# Patient Record
Sex: MALE | Race: WHITE | URBAN - METROPOLITAN AREA
[De-identification: names, ages, dates, MRNs, and addresses within clinical notes are randomized per-mention and may not be internally consistent; named-entity substitution may affect disease eponyms.]

---

## 2017-01-01 ENCOUNTER — APPOINTMENT (OUTPATIENT)
Dept: GENERAL RADIOLOGY | Facility: CLINIC | Age: 82
DRG: 283 | End: 2017-01-01
Attending: INTERNAL MEDICINE
Payer: MEDICARE

## 2017-01-01 ENCOUNTER — APPOINTMENT (OUTPATIENT)
Dept: ULTRASOUND IMAGING | Facility: CLINIC | Age: 82
DRG: 280 | End: 2017-01-01
Attending: INTERNAL MEDICINE
Payer: MEDICARE

## 2017-01-01 ENCOUNTER — APPOINTMENT (OUTPATIENT)
Dept: SPEECH THERAPY | Facility: CLINIC | Age: 82
DRG: 280 | End: 2017-01-01
Payer: MEDICARE

## 2017-01-01 ENCOUNTER — CARE COORDINATION (OUTPATIENT)
Dept: CARDIOLOGY | Facility: CLINIC | Age: 82
End: 2017-01-01

## 2017-01-01 ENCOUNTER — APPOINTMENT (OUTPATIENT)
Dept: NUCLEAR MEDICINE | Facility: CLINIC | Age: 82
DRG: 283 | End: 2017-01-01
Attending: INTERNAL MEDICINE
Payer: MEDICARE

## 2017-01-01 ENCOUNTER — HOSPITAL ENCOUNTER (OUTPATIENT)
Age: 82
End: 2017-01-01
Payer: MEDICARE

## 2017-01-01 ENCOUNTER — APPOINTMENT (OUTPATIENT)
Dept: OCCUPATIONAL THERAPY | Facility: CLINIC | Age: 82
DRG: 280 | End: 2017-01-01
Payer: MEDICARE

## 2017-01-01 ENCOUNTER — ANESTHESIA EVENT (OUTPATIENT)
Dept: CARDIOVASCULAR ICU | Facility: CLINIC | Age: 82
DRG: 280 | End: 2017-01-01
Payer: MEDICARE

## 2017-01-01 ENCOUNTER — APPOINTMENT (OUTPATIENT)
Dept: GENERAL RADIOLOGY | Facility: CLINIC | Age: 82
DRG: 280 | End: 2017-01-01
Attending: INTERNAL MEDICINE
Payer: MEDICARE

## 2017-01-01 ENCOUNTER — ANESTHESIA (OUTPATIENT)
Dept: CARDIOVASCULAR ICU | Facility: CLINIC | Age: 82
DRG: 280 | End: 2017-01-01
Payer: MEDICARE

## 2017-01-01 ENCOUNTER — APPOINTMENT (OUTPATIENT)
Dept: OCCUPATIONAL THERAPY | Facility: CLINIC | Age: 82
DRG: 280 | End: 2017-01-01
Attending: INTERNAL MEDICINE
Payer: MEDICARE

## 2017-01-01 ENCOUNTER — HOSPITAL ENCOUNTER (INPATIENT)
Facility: CLINIC | Age: 82
LOS: 4 days | DRG: 283 | End: 2017-12-10
Attending: EMERGENCY MEDICINE | Admitting: INTERNAL MEDICINE
Payer: MEDICARE

## 2017-01-01 ENCOUNTER — APPOINTMENT (OUTPATIENT)
Dept: GENERAL RADIOLOGY | Facility: CLINIC | Age: 82
DRG: 280 | End: 2017-01-01
Attending: EMERGENCY MEDICINE
Payer: MEDICARE

## 2017-01-01 ENCOUNTER — OFFICE VISIT (OUTPATIENT)
Dept: CARDIOLOGY | Facility: CLINIC | Age: 82
End: 2017-01-01
Payer: COMMERCIAL

## 2017-01-01 ENCOUNTER — HOSPITAL ENCOUNTER (INPATIENT)
Facility: CLINIC | Age: 82
LOS: 10 days | Discharge: NURSING, REHAB OR LONG-TERM CARE | DRG: 280 | End: 2017-12-04
Attending: EMERGENCY MEDICINE | Admitting: HOSPITALIST
Payer: MEDICARE

## 2017-01-01 ENCOUNTER — APPOINTMENT (OUTPATIENT)
Dept: CARDIOLOGY | Facility: CLINIC | Age: 82
DRG: 280 | End: 2017-01-01
Attending: EMERGENCY MEDICINE
Payer: MEDICARE

## 2017-01-01 ENCOUNTER — APPOINTMENT (OUTPATIENT)
Dept: SPEECH THERAPY | Facility: CLINIC | Age: 82
DRG: 280 | End: 2017-01-01
Attending: INTERNAL MEDICINE
Payer: MEDICARE

## 2017-01-01 ENCOUNTER — APPOINTMENT (OUTPATIENT)
Dept: CARDIOLOGY | Facility: CLINIC | Age: 82
DRG: 283 | End: 2017-01-01
Attending: INTERNAL MEDICINE
Payer: MEDICARE

## 2017-01-01 VITALS
HEIGHT: 68 IN | DIASTOLIC BLOOD PRESSURE: 69 MMHG | RESPIRATION RATE: 18 BRPM | OXYGEN SATURATION: 94 % | TEMPERATURE: 97.3 F | WEIGHT: 174.2 LBS | SYSTOLIC BLOOD PRESSURE: 112 MMHG | HEART RATE: 87 BPM | BODY MASS INDEX: 26.4 KG/M2

## 2017-01-01 VITALS
SYSTOLIC BLOOD PRESSURE: 90 MMHG | DIASTOLIC BLOOD PRESSURE: 60 MMHG | HEIGHT: 69 IN | WEIGHT: 171.2 LBS | BODY MASS INDEX: 25.36 KG/M2 | OXYGEN SATURATION: 94 % | TEMPERATURE: 97.9 F | RESPIRATION RATE: 15 BRPM

## 2017-01-01 VITALS
DIASTOLIC BLOOD PRESSURE: 72 MMHG | OXYGEN SATURATION: 97 % | BODY MASS INDEX: 26.25 KG/M2 | SYSTOLIC BLOOD PRESSURE: 109 MMHG | HEIGHT: 68 IN | WEIGHT: 173.2 LBS | HEART RATE: 81 BPM

## 2017-01-01 DIAGNOSIS — N17.9 ACUTE RENAL FAILURE, UNSPECIFIED ACUTE RENAL FAILURE TYPE (H): ICD-10-CM

## 2017-01-01 DIAGNOSIS — I50.9 HEART FAILURE (H): ICD-10-CM

## 2017-01-01 DIAGNOSIS — I21.4 NSTEMI (NON-ST ELEVATED MYOCARDIAL INFARCTION) (H): ICD-10-CM

## 2017-01-01 DIAGNOSIS — I50.21 ACUTE SYSTOLIC CONGESTIVE HEART FAILURE (H): Primary | ICD-10-CM

## 2017-01-01 DIAGNOSIS — I48.20 CHRONIC ATRIAL FIBRILLATION (H): ICD-10-CM

## 2017-01-01 DIAGNOSIS — E10.42 TYPE 1 DIABETES MELLITUS WITH DIABETIC POLYNEUROPATHY (H): ICD-10-CM

## 2017-01-01 DIAGNOSIS — R57.0 CARDIOGENIC SHOCK (H): ICD-10-CM

## 2017-01-01 DIAGNOSIS — I50.21 ACUTE SYSTOLIC CONGESTIVE HEART FAILURE (H): ICD-10-CM

## 2017-01-01 DIAGNOSIS — R10.13 DYSPEPSIA: ICD-10-CM

## 2017-01-01 LAB
ALBUMIN SERPL-MCNC: 3.6 G/DL (ref 3.4–5)
ALBUMIN UR-MCNC: 10 MG/DL
ALP SERPL-CCNC: 115 U/L (ref 40–150)
ALT SERPL W P-5'-P-CCNC: 33 U/L (ref 0–70)
ANION GAP SERPL CALCULATED.3IONS-SCNC: 10 MMOL/L (ref 3–14)
ANION GAP SERPL CALCULATED.3IONS-SCNC: 11 MMOL/L (ref 3–14)
ANION GAP SERPL CALCULATED.3IONS-SCNC: 13 MMOL/L (ref 3–14)
ANION GAP SERPL CALCULATED.3IONS-SCNC: 13.4 MMOL/L (ref 6–17)
ANION GAP SERPL CALCULATED.3IONS-SCNC: 14 MMOL/L (ref 3–14)
ANION GAP SERPL CALCULATED.3IONS-SCNC: 9 MMOL/L (ref 3–14)
APPEARANCE UR: CLEAR
AST SERPL W P-5'-P-CCNC: 41 U/L (ref 0–45)
BACTERIA SPEC CULT: NO GROWTH
BASOPHILS # BLD AUTO: 0 10E9/L (ref 0–0.2)
BASOPHILS NFR BLD AUTO: 0 %
BASOPHILS NFR BLD AUTO: 0.1 %
BASOPHILS NFR BLD AUTO: 0.1 %
BILIRUB SERPL-MCNC: 0.9 MG/DL (ref 0.2–1.3)
BILIRUB UR QL STRIP: NEGATIVE
BUN SERPL-MCNC: 54 MG/DL (ref 7–30)
BUN SERPL-MCNC: 63 MG/DL (ref 7–30)
BUN SERPL-MCNC: 68 MG/DL (ref 7–30)
BUN SERPL-MCNC: 70 MG/DL (ref 7–30)
BUN SERPL-MCNC: 71 MG/DL (ref 7–30)
BUN SERPL-MCNC: 72 MG/DL (ref 7–30)
BUN SERPL-MCNC: 73 MG/DL (ref 7–30)
BUN SERPL-MCNC: 73 MG/DL (ref 7–30)
BUN SERPL-MCNC: 74 MG/DL (ref 7–30)
BUN SERPL-MCNC: 74 MG/DL (ref 7–30)
BUN SERPL-MCNC: 75 MG/DL (ref 7–30)
BUN SERPL-MCNC: 75 MG/DL (ref 7–30)
BUN SERPL-MCNC: 76 MG/DL (ref 7–30)
BUN SERPL-MCNC: 77 MG/DL (ref 7–30)
BUN SERPL-MCNC: 77 MG/DL (ref 7–30)
BUN SERPL-MCNC: 78 MG/DL (ref 7–30)
BUN SERPL-MCNC: 80 MG/DL (ref 7–30)
CALCIUM SERPL-MCNC: 8.4 MG/DL (ref 8.5–10.1)
CALCIUM SERPL-MCNC: 8.4 MG/DL (ref 8.5–10.1)
CALCIUM SERPL-MCNC: 8.5 MG/DL (ref 8.5–10.1)
CALCIUM SERPL-MCNC: 8.6 MG/DL (ref 8.5–10.1)
CALCIUM SERPL-MCNC: 8.7 MG/DL (ref 8.5–10.1)
CALCIUM SERPL-MCNC: 8.8 MG/DL (ref 8.5–10.1)
CALCIUM SERPL-MCNC: 8.9 MG/DL (ref 8.5–10.1)
CALCIUM SERPL-MCNC: 9.1 MG/DL (ref 8.5–10.1)
CALCIUM SERPL-MCNC: 9.1 MG/DL (ref 8.5–10.1)
CALCIUM SERPL-MCNC: 9.2 MG/DL (ref 8.5–10.1)
CALCIUM SERPL-MCNC: 9.6 MG/DL (ref 8.5–10.5)
CHLORIDE SERPL-SCNC: 100 MMOL/L (ref 94–109)
CHLORIDE SERPL-SCNC: 100 MMOL/L (ref 94–109)
CHLORIDE SERPL-SCNC: 101 MMOL/L (ref 94–109)
CHLORIDE SERPL-SCNC: 101 MMOL/L (ref 94–109)
CHLORIDE SERPL-SCNC: 93 MMOL/L (ref 94–109)
CHLORIDE SERPL-SCNC: 93 MMOL/L (ref 94–109)
CHLORIDE SERPL-SCNC: 93 MMOL/L (ref 98–107)
CHLORIDE SERPL-SCNC: 94 MMOL/L (ref 94–109)
CHLORIDE SERPL-SCNC: 95 MMOL/L (ref 94–109)
CHLORIDE SERPL-SCNC: 96 MMOL/L (ref 94–109)
CHLORIDE SERPL-SCNC: 96 MMOL/L (ref 94–109)
CHLORIDE SERPL-SCNC: 97 MMOL/L (ref 94–109)
CHLORIDE SERPL-SCNC: 98 MMOL/L (ref 94–109)
CHLORIDE SERPL-SCNC: 98 MMOL/L (ref 94–109)
CHLORIDE SERPL-SCNC: 99 MMOL/L (ref 94–109)
CO2 SERPL-SCNC: 21 MMOL/L (ref 20–32)
CO2 SERPL-SCNC: 21 MMOL/L (ref 20–32)
CO2 SERPL-SCNC: 22 MMOL/L (ref 20–32)
CO2 SERPL-SCNC: 23 MMOL/L (ref 20–32)
CO2 SERPL-SCNC: 23 MMOL/L (ref 20–32)
CO2 SERPL-SCNC: 24 MMOL/L (ref 20–32)
CO2 SERPL-SCNC: 24 MMOL/L (ref 23–29)
CO2 SERPL-SCNC: 25 MMOL/L (ref 20–32)
CO2 SERPL-SCNC: 25 MMOL/L (ref 20–32)
CO2 SERPL-SCNC: 26 MMOL/L (ref 20–32)
COLOR UR AUTO: YELLOW
CREAT SERPL-MCNC: 2.09 MG/DL (ref 0.66–1.25)
CREAT SERPL-MCNC: 2.11 MG/DL (ref 0.66–1.25)
CREAT SERPL-MCNC: 2.18 MG/DL (ref 0.66–1.25)
CREAT SERPL-MCNC: 2.19 MG/DL (ref 0.66–1.25)
CREAT SERPL-MCNC: 2.2 MG/DL (ref 0.66–1.25)
CREAT SERPL-MCNC: 2.2 MG/DL (ref 0.66–1.25)
CREAT SERPL-MCNC: 2.22 MG/DL (ref 0.66–1.25)
CREAT SERPL-MCNC: 2.24 MG/DL (ref 0.66–1.25)
CREAT SERPL-MCNC: 2.27 MG/DL (ref 0.66–1.25)
CREAT SERPL-MCNC: 2.28 MG/DL (ref 0.66–1.25)
CREAT SERPL-MCNC: 2.3 MG/DL (ref 0.66–1.25)
CREAT SERPL-MCNC: 2.3 MG/DL (ref 0.66–1.25)
CREAT SERPL-MCNC: 2.33 MG/DL (ref 0.66–1.25)
CREAT SERPL-MCNC: 2.36 MG/DL (ref 0.66–1.25)
CREAT SERPL-MCNC: 2.44 MG/DL (ref 0.66–1.25)
CREAT SERPL-MCNC: 2.5 MG/DL (ref 0.7–1.3)
CREAT SERPL-MCNC: 2.55 MG/DL (ref 0.66–1.25)
CREAT UR-MCNC: 289 MG/DL
DEPRECATED CALCIDIOL+CALCIFEROL SERPL-MC: 36 UG/L (ref 20–75)
DIFFERENTIAL METHOD BLD: ABNORMAL
EOSINOPHIL # BLD AUTO: 0 10E9/L (ref 0–0.7)
EOSINOPHIL # BLD AUTO: 0.1 10E9/L (ref 0–0.7)
EOSINOPHIL # BLD AUTO: 0.1 10E9/L (ref 0–0.7)
EOSINOPHIL NFR BLD AUTO: 0.1 %
EOSINOPHIL NFR BLD AUTO: 0.6 %
EOSINOPHIL NFR BLD AUTO: 0.6 %
ERYTHROCYTE [DISTWIDTH] IN BLOOD BY AUTOMATED COUNT: 14.6 % (ref 10–15)
ERYTHROCYTE [DISTWIDTH] IN BLOOD BY AUTOMATED COUNT: 14.8 % (ref 10–15)
ERYTHROCYTE [DISTWIDTH] IN BLOOD BY AUTOMATED COUNT: 14.8 % (ref 10–15)
ERYTHROCYTE [DISTWIDTH] IN BLOOD BY AUTOMATED COUNT: 15.1 % (ref 10–15)
ERYTHROCYTE [DISTWIDTH] IN BLOOD BY AUTOMATED COUNT: 15.2 % (ref 10–15)
FLUAV+FLUBV AG SPEC QL: NEGATIVE
FLUAV+FLUBV AG SPEC QL: NEGATIVE
GFR SERPL CREATININE-BSD FRML MDRD: 24 ML/MIN/1.7M2
GFR SERPL CREATININE-BSD FRML MDRD: 24 ML/MIN/1.7M2
GFR SERPL CREATININE-BSD FRML MDRD: 25 ML/MIN/1.7M2
GFR SERPL CREATININE-BSD FRML MDRD: 26 ML/MIN/1.7M2
GFR SERPL CREATININE-BSD FRML MDRD: 26 ML/MIN/1.7M2
GFR SERPL CREATININE-BSD FRML MDRD: 27 ML/MIN/1.7M2
GFR SERPL CREATININE-BSD FRML MDRD: 28 ML/MIN/1.7M2
GFR SERPL CREATININE-BSD FRML MDRD: 29 ML/MIN/1.7M2
GFR SERPL CREATININE-BSD FRML MDRD: 30 ML/MIN/1.7M2
GFR SERPL CREATININE-BSD FRML MDRD: 30 ML/MIN/1.7M2
GLUCOSE BLDC GLUCOMTR-MCNC: 101 MG/DL (ref 70–99)
GLUCOSE BLDC GLUCOMTR-MCNC: 102 MG/DL (ref 70–99)
GLUCOSE BLDC GLUCOMTR-MCNC: 103 MG/DL (ref 70–99)
GLUCOSE BLDC GLUCOMTR-MCNC: 103 MG/DL (ref 70–99)
GLUCOSE BLDC GLUCOMTR-MCNC: 104 MG/DL (ref 70–99)
GLUCOSE BLDC GLUCOMTR-MCNC: 107 MG/DL (ref 70–99)
GLUCOSE BLDC GLUCOMTR-MCNC: 107 MG/DL (ref 70–99)
GLUCOSE BLDC GLUCOMTR-MCNC: 110 MG/DL (ref 70–99)
GLUCOSE BLDC GLUCOMTR-MCNC: 110 MG/DL (ref 70–99)
GLUCOSE BLDC GLUCOMTR-MCNC: 111 MG/DL (ref 70–99)
GLUCOSE BLDC GLUCOMTR-MCNC: 121 MG/DL (ref 70–99)
GLUCOSE BLDC GLUCOMTR-MCNC: 122 MG/DL (ref 70–99)
GLUCOSE BLDC GLUCOMTR-MCNC: 125 MG/DL (ref 70–99)
GLUCOSE BLDC GLUCOMTR-MCNC: 126 MG/DL (ref 70–99)
GLUCOSE BLDC GLUCOMTR-MCNC: 130 MG/DL (ref 70–99)
GLUCOSE BLDC GLUCOMTR-MCNC: 131 MG/DL (ref 70–99)
GLUCOSE BLDC GLUCOMTR-MCNC: 131 MG/DL (ref 70–99)
GLUCOSE BLDC GLUCOMTR-MCNC: 132 MG/DL (ref 70–99)
GLUCOSE BLDC GLUCOMTR-MCNC: 134 MG/DL (ref 70–99)
GLUCOSE BLDC GLUCOMTR-MCNC: 135 MG/DL (ref 70–99)
GLUCOSE BLDC GLUCOMTR-MCNC: 136 MG/DL (ref 70–99)
GLUCOSE BLDC GLUCOMTR-MCNC: 137 MG/DL (ref 70–99)
GLUCOSE BLDC GLUCOMTR-MCNC: 138 MG/DL (ref 70–99)
GLUCOSE BLDC GLUCOMTR-MCNC: 139 MG/DL (ref 70–99)
GLUCOSE BLDC GLUCOMTR-MCNC: 143 MG/DL (ref 70–99)
GLUCOSE BLDC GLUCOMTR-MCNC: 147 MG/DL (ref 70–99)
GLUCOSE BLDC GLUCOMTR-MCNC: 149 MG/DL (ref 70–99)
GLUCOSE BLDC GLUCOMTR-MCNC: 150 MG/DL (ref 70–99)
GLUCOSE BLDC GLUCOMTR-MCNC: 155 MG/DL (ref 70–99)
GLUCOSE BLDC GLUCOMTR-MCNC: 161 MG/DL (ref 70–99)
GLUCOSE BLDC GLUCOMTR-MCNC: 162 MG/DL (ref 70–99)
GLUCOSE BLDC GLUCOMTR-MCNC: 162 MG/DL (ref 70–99)
GLUCOSE BLDC GLUCOMTR-MCNC: 164 MG/DL (ref 70–99)
GLUCOSE BLDC GLUCOMTR-MCNC: 165 MG/DL (ref 70–99)
GLUCOSE BLDC GLUCOMTR-MCNC: 167 MG/DL (ref 70–99)
GLUCOSE BLDC GLUCOMTR-MCNC: 168 MG/DL (ref 70–99)
GLUCOSE BLDC GLUCOMTR-MCNC: 169 MG/DL (ref 70–99)
GLUCOSE BLDC GLUCOMTR-MCNC: 169 MG/DL (ref 70–99)
GLUCOSE BLDC GLUCOMTR-MCNC: 171 MG/DL (ref 70–99)
GLUCOSE BLDC GLUCOMTR-MCNC: 174 MG/DL (ref 70–99)
GLUCOSE BLDC GLUCOMTR-MCNC: 177 MG/DL (ref 70–99)
GLUCOSE BLDC GLUCOMTR-MCNC: 179 MG/DL (ref 70–99)
GLUCOSE BLDC GLUCOMTR-MCNC: 183 MG/DL (ref 70–99)
GLUCOSE BLDC GLUCOMTR-MCNC: 184 MG/DL (ref 70–99)
GLUCOSE BLDC GLUCOMTR-MCNC: 188 MG/DL (ref 70–99)
GLUCOSE BLDC GLUCOMTR-MCNC: 192 MG/DL (ref 70–99)
GLUCOSE BLDC GLUCOMTR-MCNC: 192 MG/DL (ref 70–99)
GLUCOSE BLDC GLUCOMTR-MCNC: 195 MG/DL (ref 70–99)
GLUCOSE BLDC GLUCOMTR-MCNC: 203 MG/DL (ref 70–99)
GLUCOSE BLDC GLUCOMTR-MCNC: 206 MG/DL (ref 70–99)
GLUCOSE BLDC GLUCOMTR-MCNC: 207 MG/DL (ref 70–99)
GLUCOSE BLDC GLUCOMTR-MCNC: 215 MG/DL (ref 70–99)
GLUCOSE BLDC GLUCOMTR-MCNC: 224 MG/DL (ref 70–99)
GLUCOSE BLDC GLUCOMTR-MCNC: 240 MG/DL (ref 70–99)
GLUCOSE BLDC GLUCOMTR-MCNC: 244 MG/DL (ref 70–99)
GLUCOSE BLDC GLUCOMTR-MCNC: 247 MG/DL (ref 70–99)
GLUCOSE BLDC GLUCOMTR-MCNC: 252 MG/DL (ref 70–99)
GLUCOSE BLDC GLUCOMTR-MCNC: 268 MG/DL (ref 70–99)
GLUCOSE BLDC GLUCOMTR-MCNC: 65 MG/DL (ref 70–99)
GLUCOSE BLDC GLUCOMTR-MCNC: 71 MG/DL (ref 70–99)
GLUCOSE BLDC GLUCOMTR-MCNC: 74 MG/DL (ref 70–99)
GLUCOSE BLDC GLUCOMTR-MCNC: 75 MG/DL (ref 70–99)
GLUCOSE BLDC GLUCOMTR-MCNC: 75 MG/DL (ref 70–99)
GLUCOSE BLDC GLUCOMTR-MCNC: 79 MG/DL (ref 70–99)
GLUCOSE BLDC GLUCOMTR-MCNC: 83 MG/DL (ref 70–99)
GLUCOSE BLDC GLUCOMTR-MCNC: 83 MG/DL (ref 70–99)
GLUCOSE BLDC GLUCOMTR-MCNC: 85 MG/DL (ref 70–99)
GLUCOSE BLDC GLUCOMTR-MCNC: 88 MG/DL (ref 70–99)
GLUCOSE BLDC GLUCOMTR-MCNC: 92 MG/DL (ref 70–99)
GLUCOSE BLDC GLUCOMTR-MCNC: 94 MG/DL (ref 70–99)
GLUCOSE SERPL-MCNC: 101 MG/DL (ref 70–99)
GLUCOSE SERPL-MCNC: 102 MG/DL (ref 70–99)
GLUCOSE SERPL-MCNC: 108 MG/DL (ref 70–99)
GLUCOSE SERPL-MCNC: 130 MG/DL (ref 70–99)
GLUCOSE SERPL-MCNC: 143 MG/DL (ref 70–99)
GLUCOSE SERPL-MCNC: 176 MG/DL (ref 70–99)
GLUCOSE SERPL-MCNC: 190 MG/DL (ref 70–105)
GLUCOSE SERPL-MCNC: 236 MG/DL (ref 70–99)
GLUCOSE SERPL-MCNC: 272 MG/DL (ref 70–99)
GLUCOSE SERPL-MCNC: 61 MG/DL (ref 70–99)
GLUCOSE SERPL-MCNC: 63 MG/DL (ref 70–99)
GLUCOSE SERPL-MCNC: 70 MG/DL (ref 70–99)
GLUCOSE SERPL-MCNC: 75 MG/DL (ref 70–99)
GLUCOSE SERPL-MCNC: 79 MG/DL (ref 70–99)
GLUCOSE SERPL-MCNC: 85 MG/DL (ref 70–99)
GLUCOSE SERPL-MCNC: 97 MG/DL (ref 70–99)
GLUCOSE SERPL-MCNC: 98 MG/DL (ref 70–99)
GLUCOSE UR STRIP-MCNC: NEGATIVE MG/DL
HBA1C MFR BLD: 8.4 % (ref 4.3–6)
HCT VFR BLD AUTO: 31.8 % (ref 40–53)
HCT VFR BLD AUTO: 32.1 % (ref 40–53)
HCT VFR BLD AUTO: 34.7 % (ref 40–53)
HCT VFR BLD AUTO: 35.6 % (ref 40–53)
HCT VFR BLD AUTO: 35.6 % (ref 40–53)
HGB BLD-MCNC: 10.5 G/DL (ref 13.3–17.7)
HGB BLD-MCNC: 10.6 G/DL (ref 13.3–17.7)
HGB BLD-MCNC: 11.5 G/DL (ref 13.3–17.7)
HGB BLD-MCNC: 11.6 G/DL (ref 13.3–17.7)
HGB BLD-MCNC: 11.7 G/DL (ref 13.3–17.7)
HGB UR QL STRIP: NEGATIVE
IMM GRANULOCYTES # BLD: 0 10E9/L (ref 0–0.4)
IMM GRANULOCYTES NFR BLD: 0.2 %
IMM GRANULOCYTES NFR BLD: 0.3 %
IMM GRANULOCYTES NFR BLD: 0.3 %
INR PPP: 1.83 (ref 0.86–1.14)
INR PPP: 1.86 (ref 0.86–1.14)
INR PPP: 1.98 (ref 0.86–1.14)
INR PPP: 2.02 (ref 0.86–1.14)
INR PPP: 2.16 (ref 0.86–1.14)
INR PPP: 2.22 (ref 0.86–1.14)
INR PPP: 2.49 (ref 0.86–1.14)
INR PPP: 2.69 (ref 0.86–1.14)
INR PPP: 2.96 (ref 0.86–1.14)
INR PPP: 3.1 (ref 0.86–1.14)
INR PPP: 3.23 (ref 0.86–1.14)
INR PPP: 3.31 (ref 0.86–1.14)
INR PPP: 3.69 (ref 0.86–1.14)
INR PPP: 3.71 (ref 0.86–1.14)
INR PPP: 6.51 (ref 0.86–1.14)
INR PPP: 6.67 (ref 0.86–1.14)
INTERPRETATION ECG - MUSE: NORMAL
KETONES UR STRIP-MCNC: 5 MG/DL
LACTATE BLD-SCNC: 1.1 MMOL/L (ref 0.7–2)
LACTATE BLD-SCNC: 3.1 MMOL/L (ref 0.7–2)
LACTATE BLD-SCNC: 4.9 MMOL/L (ref 0.7–2)
LACTATE BLD-SCNC: 5.4 MMOL/L (ref 0.7–2)
LEUKOCYTE ESTERASE UR QL STRIP: ABNORMAL
LMWH PPP CHRO-ACNC: 0.22 IU/ML
LMWH PPP CHRO-ACNC: 0.23 IU/ML
LMWH PPP CHRO-ACNC: 0.67 IU/ML
LYMPHOCYTES # BLD AUTO: 1.3 10E9/L (ref 0.8–5.3)
LYMPHOCYTES # BLD AUTO: 1.4 10E9/L (ref 0.8–5.3)
LYMPHOCYTES # BLD AUTO: 2.8 10E9/L (ref 0.8–5.3)
LYMPHOCYTES NFR BLD AUTO: 13.5 %
LYMPHOCYTES NFR BLD AUTO: 17 %
LYMPHOCYTES NFR BLD AUTO: 31 %
Lab: NORMAL
MAGNESIUM SERPL-MCNC: 2.7 MG/DL (ref 1.6–2.3)
MCH RBC QN AUTO: 29.3 PG (ref 26.5–33)
MCH RBC QN AUTO: 29.4 PG (ref 26.5–33)
MCH RBC QN AUTO: 29.6 PG (ref 26.5–33)
MCH RBC QN AUTO: 29.7 PG (ref 26.5–33)
MCH RBC QN AUTO: 30.2 PG (ref 26.5–33)
MCHC RBC AUTO-ENTMCNC: 32.6 G/DL (ref 31.5–36.5)
MCHC RBC AUTO-ENTMCNC: 32.7 G/DL (ref 31.5–36.5)
MCHC RBC AUTO-ENTMCNC: 32.9 G/DL (ref 31.5–36.5)
MCHC RBC AUTO-ENTMCNC: 33.1 G/DL (ref 31.5–36.5)
MCHC RBC AUTO-ENTMCNC: 33.3 G/DL (ref 31.5–36.5)
MCV RBC AUTO: 88 FL (ref 78–100)
MCV RBC AUTO: 89 FL (ref 78–100)
MCV RBC AUTO: 91 FL (ref 78–100)
MCV RBC AUTO: 91 FL (ref 78–100)
MCV RBC AUTO: 92 FL (ref 78–100)
MICROALBUMIN UR-MCNC: 54 MG/L
MICROALBUMIN/CREAT UR: 18.79 MG/G CR (ref 0–17)
MONOCYTES # BLD AUTO: 0.4 10E9/L (ref 0–1.3)
MONOCYTES # BLD AUTO: 0.5 10E9/L (ref 0–1.3)
MONOCYTES # BLD AUTO: 0.6 10E9/L (ref 0–1.3)
MONOCYTES NFR BLD AUTO: 4.5 %
MONOCYTES NFR BLD AUTO: 5.6 %
MONOCYTES NFR BLD AUTO: 7.2 %
NEUTROPHILS # BLD AUTO: 5.6 10E9/L (ref 1.6–8.3)
NEUTROPHILS # BLD AUTO: 6.1 10E9/L (ref 1.6–8.3)
NEUTROPHILS # BLD AUTO: 7.6 10E9/L (ref 1.6–8.3)
NEUTROPHILS NFR BLD AUTO: 62.4 %
NEUTROPHILS NFR BLD AUTO: 75 %
NEUTROPHILS NFR BLD AUTO: 81.5 %
NITRATE UR QL: NEGATIVE
NRBC # BLD AUTO: 0 10*3/UL
NRBC # BLD AUTO: 0 10*3/UL
NRBC # BLD AUTO: 0.1 10*3/UL
NRBC BLD AUTO-RTO: 0 /100
NRBC BLD AUTO-RTO: 0 /100
NRBC BLD AUTO-RTO: 1 /100
NT-PROBNP SERPL-MCNC: ABNORMAL PG/ML (ref 0–1800)
PH UR STRIP: 5 PH (ref 5–7)
PHOSPHATE SERPL-MCNC: 4.6 MG/DL (ref 2.5–4.5)
PLATELET # BLD AUTO: 229 10E9/L (ref 150–450)
PLATELET # BLD AUTO: 239 10E9/L (ref 150–450)
PLATELET # BLD AUTO: 245 10E9/L (ref 150–450)
PLATELET # BLD AUTO: 248 10E9/L (ref 150–450)
PLATELET # BLD AUTO: 278 10E9/L (ref 150–450)
POTASSIUM SERPL-SCNC: 4.1 MMOL/L (ref 3.4–5.3)
POTASSIUM SERPL-SCNC: 4.2 MMOL/L (ref 3.4–5.3)
POTASSIUM SERPL-SCNC: 4.3 MMOL/L (ref 3.4–5.3)
POTASSIUM SERPL-SCNC: 4.5 MMOL/L (ref 3.4–5.3)
POTASSIUM SERPL-SCNC: 4.5 MMOL/L (ref 3.4–5.3)
POTASSIUM SERPL-SCNC: 4.6 MMOL/L (ref 3.4–5.3)
POTASSIUM SERPL-SCNC: 4.6 MMOL/L (ref 3.4–5.3)
POTASSIUM SERPL-SCNC: 4.7 MMOL/L (ref 3.4–5.3)
POTASSIUM SERPL-SCNC: 4.8 MMOL/L (ref 3.4–5.3)
POTASSIUM SERPL-SCNC: 4.8 MMOL/L (ref 3.4–5.3)
POTASSIUM SERPL-SCNC: 4.9 MMOL/L (ref 3.4–5.3)
POTASSIUM SERPL-SCNC: 5 MMOL/L (ref 3.4–5.3)
POTASSIUM SERPL-SCNC: 5 MMOL/L (ref 3.4–5.3)
POTASSIUM SERPL-SCNC: 5.1 MMOL/L (ref 3.4–5.3)
POTASSIUM SERPL-SCNC: 5.2 MMOL/L (ref 3.4–5.3)
POTASSIUM SERPL-SCNC: 5.4 MMOL/L (ref 3.5–5.1)
POTASSIUM SERPL-SCNC: 5.5 MMOL/L (ref 3.4–5.3)
PROCALCITONIN SERPL-MCNC: 0.11 NG/ML
PROT SERPL-MCNC: 7.8 G/DL (ref 6.8–8.8)
PTH-INTACT SERPL-MCNC: 153 PG/ML (ref 12–72)
RBC # BLD AUTO: 3.54 10E12/L (ref 4.4–5.9)
RBC # BLD AUTO: 3.6 10E12/L (ref 4.4–5.9)
RBC # BLD AUTO: 3.87 10E12/L (ref 4.4–5.9)
RBC # BLD AUTO: 3.92 10E12/L (ref 4.4–5.9)
RBC # BLD AUTO: 3.92 10E12/L (ref 4.4–5.9)
RBC #/AREA URNS AUTO: 3 /HPF (ref 0–2)
SODIUM SERPL-SCNC: 125 MMOL/L (ref 136–145)
SODIUM SERPL-SCNC: 127 MMOL/L (ref 133–144)
SODIUM SERPL-SCNC: 128 MMOL/L (ref 133–144)
SODIUM SERPL-SCNC: 129 MMOL/L (ref 133–144)
SODIUM SERPL-SCNC: 130 MMOL/L (ref 133–144)
SODIUM SERPL-SCNC: 132 MMOL/L (ref 133–144)
SODIUM SERPL-SCNC: 133 MMOL/L (ref 133–144)
SODIUM SERPL-SCNC: 133 MMOL/L (ref 133–144)
SODIUM SERPL-SCNC: 134 MMOL/L (ref 133–144)
SODIUM SERPL-SCNC: 135 MMOL/L (ref 133–144)
SODIUM SERPL-SCNC: 136 MMOL/L (ref 133–144)
SODIUM SERPL-SCNC: 136 MMOL/L (ref 133–144)
SODIUM UR-SCNC: 9 MMOL/L
SOURCE: ABNORMAL
SP GR UR STRIP: 1.02 (ref 1–1.03)
SPECIMEN SOURCE: NORMAL
SPERM #/AREA URNS HPF: PRESENT /HPF
TROPONIN I SERPL-MCNC: 0.84 UG/L (ref 0–0.04)
TROPONIN I SERPL-MCNC: 3.59 UG/L (ref 0–0.04)
TROPONIN I SERPL-MCNC: 3.6 UG/L (ref 0–0.04)
TROPONIN I SERPL-MCNC: 4.13 UG/L (ref 0–0.04)
TROPONIN I SERPL-MCNC: 4.34 UG/L (ref 0–0.04)
TROPONIN I SERPL-MCNC: 4.42 UG/L (ref 0–0.04)
TROPONIN I SERPL-MCNC: 4.67 UG/L (ref 0–0.04)
TROPONIN I SERPL-MCNC: 5.19 UG/L (ref 0–0.04)
TROPONIN I SERPL-MCNC: 5.38 UG/L (ref 0–0.04)
TROPONIN I SERPL-MCNC: 7.23 UG/L (ref 0–0.04)
UROBILINOGEN UR STRIP-MCNC: NORMAL MG/DL (ref 0–2)
WBC # BLD AUTO: 10.5 10E9/L (ref 4–11)
WBC # BLD AUTO: 8.1 10E9/L (ref 4–11)
WBC # BLD AUTO: 9 10E9/L (ref 4–11)
WBC # BLD AUTO: 9.3 10E9/L (ref 4–11)
WBC # BLD AUTO: 9.6 10E9/L (ref 4–11)
WBC #/AREA URNS AUTO: 12 /HPF (ref 0–2)

## 2017-01-01 PROCEDURE — A9270 NON-COVERED ITEM OR SERVICE: HCPCS | Mod: GY | Performed by: HOSPITALIST

## 2017-01-01 PROCEDURE — 74230 X-RAY XM SWLNG FUNCJ C+: CPT

## 2017-01-01 PROCEDURE — 99285 EMERGENCY DEPT VISIT HI MDM: CPT | Mod: 25

## 2017-01-01 PROCEDURE — 00000146 ZZHCL STATISTIC GLUCOSE BY METER IP

## 2017-01-01 PROCEDURE — 99233 SBSQ HOSP IP/OBS HIGH 50: CPT | Performed by: INTERNAL MEDICINE

## 2017-01-01 PROCEDURE — 25000128 H RX IP 250 OP 636: Performed by: EMERGENCY MEDICINE

## 2017-01-01 PROCEDURE — 25000132 ZZH RX MED GY IP 250 OP 250 PS 637: Mod: GY | Performed by: HOSPITALIST

## 2017-01-01 PROCEDURE — 85520 HEPARIN ASSAY: CPT | Performed by: HOSPITALIST

## 2017-01-01 PROCEDURE — 99232 SBSQ HOSP IP/OBS MODERATE 35: CPT | Performed by: INTERNAL MEDICINE

## 2017-01-01 PROCEDURE — A9270 NON-COVERED ITEM OR SERVICE: HCPCS | Mod: GY | Performed by: INTERNAL MEDICINE

## 2017-01-01 PROCEDURE — 85610 PROTHROMBIN TIME: CPT | Performed by: INTERNAL MEDICINE

## 2017-01-01 PROCEDURE — 25000132 ZZH RX MED GY IP 250 OP 250 PS 637: Mod: GY | Performed by: INTERNAL MEDICINE

## 2017-01-01 PROCEDURE — 85027 COMPLETE CBC AUTOMATED: CPT | Performed by: INTERNAL MEDICINE

## 2017-01-01 PROCEDURE — 25000131 ZZH RX MED GY IP 250 OP 636 PS 637: Mod: GY | Performed by: INTERNAL MEDICINE

## 2017-01-01 PROCEDURE — 21000001 ZZH R&B HEART CARE

## 2017-01-01 PROCEDURE — 80048 BASIC METABOLIC PNL TOTAL CA: CPT | Performed by: INTERNAL MEDICINE

## 2017-01-01 PROCEDURE — 25000128 H RX IP 250 OP 636

## 2017-01-01 PROCEDURE — 84484 ASSAY OF TROPONIN QUANT: CPT | Performed by: INTERNAL MEDICINE

## 2017-01-01 PROCEDURE — 84100 ASSAY OF PHOSPHORUS: CPT | Performed by: INTERNAL MEDICINE

## 2017-01-01 PROCEDURE — 40000141 ZZH STATISTIC PERIPHERAL IV START W/O US GUIDANCE

## 2017-01-01 PROCEDURE — 99207 ZZC DOWN CODE DUE TO INITIAL EXAM: CPT | Performed by: INTERNAL MEDICINE

## 2017-01-01 PROCEDURE — 99221 1ST HOSP IP/OBS SF/LOW 40: CPT | Mod: AI | Performed by: INTERNAL MEDICINE

## 2017-01-01 PROCEDURE — A9502 TC99M TETROFOSMIN: HCPCS | Performed by: INTERNAL MEDICINE

## 2017-01-01 PROCEDURE — 21400002 ZZH R&B CCU CICU CRITICAL

## 2017-01-01 PROCEDURE — 80048 BASIC METABOLIC PNL TOTAL CA: CPT | Performed by: NURSE PRACTITIONER

## 2017-01-01 PROCEDURE — 76770 US EXAM ABDO BACK WALL COMP: CPT

## 2017-01-01 PROCEDURE — 80048 BASIC METABOLIC PNL TOTAL CA: CPT | Performed by: HOSPITALIST

## 2017-01-01 PROCEDURE — 40000225 ZZH STATISTIC SLP WARD VISIT: Performed by: SPEECH-LANGUAGE PATHOLOGIST

## 2017-01-01 PROCEDURE — 85610 PROTHROMBIN TIME: CPT | Performed by: HOSPITALIST

## 2017-01-01 PROCEDURE — 93005 ELECTROCARDIOGRAM TRACING: CPT

## 2017-01-01 PROCEDURE — 78452 HT MUSCLE IMAGE SPECT MULT: CPT

## 2017-01-01 PROCEDURE — 81001 URINALYSIS AUTO W/SCOPE: CPT | Performed by: HOSPITALIST

## 2017-01-01 PROCEDURE — 40000885 ZZH STATISTIC STEP DOWN HRS EVENING

## 2017-01-01 PROCEDURE — 25000128 H RX IP 250 OP 636: Performed by: INTERNAL MEDICINE

## 2017-01-01 PROCEDURE — 84484 ASSAY OF TROPONIN QUANT: CPT | Performed by: HOSPITALIST

## 2017-01-01 PROCEDURE — A9270 NON-COVERED ITEM OR SERVICE: HCPCS | Mod: GY | Performed by: NURSE PRACTITIONER

## 2017-01-01 PROCEDURE — 25000131 ZZH RX MED GY IP 250 OP 636 PS 637: Mod: GY | Performed by: HOSPITALIST

## 2017-01-01 PROCEDURE — 36415 COLL VENOUS BLD VENIPUNCTURE: CPT | Performed by: INTERNAL MEDICINE

## 2017-01-01 PROCEDURE — 40000893 ZZH STATISTIC PT IP EVAL DEFER: Performed by: PHYSICAL THERAPIST

## 2017-01-01 PROCEDURE — 36569 INSJ PICC 5 YR+ W/O IMAGING: CPT

## 2017-01-01 PROCEDURE — 36415 COLL VENOUS BLD VENIPUNCTURE: CPT

## 2017-01-01 PROCEDURE — 21000000 ZZH R&B IMCU HEART CARE

## 2017-01-01 PROCEDURE — 36415 COLL VENOUS BLD VENIPUNCTURE: CPT | Performed by: NURSE PRACTITIONER

## 2017-01-01 PROCEDURE — 25000128 H RX IP 250 OP 636: Performed by: HOSPITALIST

## 2017-01-01 PROCEDURE — 99207 ZZC CDG-MDM COMPONENT: MEETS MODERATE - UP CODED: CPT | Performed by: INTERNAL MEDICINE

## 2017-01-01 PROCEDURE — 40000239 ZZH STATISTIC VAT ROUNDS

## 2017-01-01 PROCEDURE — 40000225 ZZH STATISTIC SLP WARD VISIT

## 2017-01-01 PROCEDURE — 96365 THER/PROPH/DIAG IV INF INIT: CPT

## 2017-01-01 PROCEDURE — 83605 ASSAY OF LACTIC ACID: CPT | Performed by: EMERGENCY MEDICINE

## 2017-01-01 PROCEDURE — 87086 URINE CULTURE/COLONY COUNT: CPT | Performed by: HOSPITALIST

## 2017-01-01 PROCEDURE — 25000132 ZZH RX MED GY IP 250 OP 250 PS 637: Mod: GY | Performed by: NURSE PRACTITIONER

## 2017-01-01 PROCEDURE — 83605 ASSAY OF LACTIC ACID: CPT | Performed by: HOSPITALIST

## 2017-01-01 PROCEDURE — 92526 ORAL FUNCTION THERAPY: CPT | Mod: GN | Performed by: SPEECH-LANGUAGE PATHOLOGIST

## 2017-01-01 PROCEDURE — 80048 BASIC METABOLIC PNL TOTAL CA: CPT | Performed by: EMERGENCY MEDICINE

## 2017-01-01 PROCEDURE — 84484 ASSAY OF TROPONIN QUANT: CPT | Performed by: EMERGENCY MEDICINE

## 2017-01-01 PROCEDURE — 82306 VITAMIN D 25 HYDROXY: CPT | Performed by: INTERNAL MEDICINE

## 2017-01-01 PROCEDURE — 99223 1ST HOSP IP/OBS HIGH 75: CPT | Mod: 25 | Performed by: INTERNAL MEDICINE

## 2017-01-01 PROCEDURE — 83605 ASSAY OF LACTIC ACID: CPT | Performed by: INTERNAL MEDICINE

## 2017-01-01 PROCEDURE — 99223 1ST HOSP IP/OBS HIGH 75: CPT | Mod: AI | Performed by: HOSPITALIST

## 2017-01-01 PROCEDURE — 80053 COMPREHEN METABOLIC PANEL: CPT | Performed by: EMERGENCY MEDICINE

## 2017-01-01 PROCEDURE — 99238 HOSP IP/OBS DSCHRG MGMT 30/<: CPT | Performed by: NURSE PRACTITIONER

## 2017-01-01 PROCEDURE — 85027 COMPLETE CBC AUTOMATED: CPT | Performed by: HOSPITALIST

## 2017-01-01 PROCEDURE — 82043 UR ALBUMIN QUANTITATIVE: CPT | Performed by: INTERNAL MEDICINE

## 2017-01-01 PROCEDURE — A9270 NON-COVERED ITEM OR SERVICE: HCPCS | Mod: GY

## 2017-01-01 PROCEDURE — 83970 ASSAY OF PARATHORMONE: CPT | Performed by: INTERNAL MEDICINE

## 2017-01-01 PROCEDURE — 93306 TTE W/DOPPLER COMPLETE: CPT | Mod: 26 | Performed by: INTERNAL MEDICINE

## 2017-01-01 PROCEDURE — 92526 ORAL FUNCTION THERAPY: CPT | Mod: GN

## 2017-01-01 PROCEDURE — 40000855 ZZH STATISTIC ECHO STRESS OR NM NPI

## 2017-01-01 PROCEDURE — 99207 ZZC NO DOCUMENTATION ON VISIT: CPT | Performed by: INTERNAL MEDICINE

## 2017-01-01 PROCEDURE — 40000133 ZZH STATISTIC OT WARD VISIT: Performed by: OCCUPATIONAL THERAPIST

## 2017-01-01 PROCEDURE — 93017 CV STRESS TEST TRACING ONLY: CPT

## 2017-01-01 PROCEDURE — 85610 PROTHROMBIN TIME: CPT | Performed by: EMERGENCY MEDICINE

## 2017-01-01 PROCEDURE — 87804 INFLUENZA ASSAY W/OPTIC: CPT | Performed by: EMERGENCY MEDICINE

## 2017-01-01 PROCEDURE — 83735 ASSAY OF MAGNESIUM: CPT | Performed by: INTERNAL MEDICINE

## 2017-01-01 PROCEDURE — 36415 COLL VENOUS BLD VENIPUNCTURE: CPT | Performed by: HOSPITALIST

## 2017-01-01 PROCEDURE — 40000986 XR CHEST PORT 1 VW

## 2017-01-01 PROCEDURE — 85025 COMPLETE CBC W/AUTO DIFF WBC: CPT | Performed by: HOSPITALIST

## 2017-01-01 PROCEDURE — 85520 HEPARIN ASSAY: CPT | Performed by: INTERNAL MEDICINE

## 2017-01-01 PROCEDURE — 27210220 ZZH KIT SHRLOCK 5FR DUAL LUM PICC

## 2017-01-01 PROCEDURE — 87040 BLOOD CULTURE FOR BACTERIA: CPT | Performed by: EMERGENCY MEDICINE

## 2017-01-01 PROCEDURE — 83880 ASSAY OF NATRIURETIC PEPTIDE: CPT | Performed by: EMERGENCY MEDICINE

## 2017-01-01 PROCEDURE — 34300033 ZZH RX 343: Performed by: INTERNAL MEDICINE

## 2017-01-01 PROCEDURE — 85025 COMPLETE CBC W/AUTO DIFF WBC: CPT | Performed by: EMERGENCY MEDICINE

## 2017-01-01 PROCEDURE — 71020 XR CHEST 2 VW: CPT

## 2017-01-01 PROCEDURE — 40000884 ZZH STATISTIC STEP DOWN HRS NIGHT

## 2017-01-01 PROCEDURE — 40000886 ZZH STATISTIC STEP DOWN HRS DAY

## 2017-01-01 PROCEDURE — 40000133 ZZH STATISTIC OT WARD VISIT

## 2017-01-01 PROCEDURE — 93306 TTE W/DOPPLER COMPLETE: CPT

## 2017-01-01 PROCEDURE — 93010 ELECTROCARDIOGRAM REPORT: CPT | Performed by: INTERNAL MEDICINE

## 2017-01-01 PROCEDURE — 25500064 ZZH RX 255 OP 636: Performed by: EMERGENCY MEDICINE

## 2017-01-01 PROCEDURE — 97165 OT EVAL LOW COMPLEX 30 MIN: CPT | Mod: GO

## 2017-01-01 PROCEDURE — 93016 CV STRESS TEST SUPVJ ONLY: CPT | Performed by: INTERNAL MEDICINE

## 2017-01-01 PROCEDURE — 97535 SELF CARE MNGMENT TRAINING: CPT | Mod: GO | Performed by: OCCUPATIONAL THERAPIST

## 2017-01-01 PROCEDURE — 99239 HOSP IP/OBS DSCHRG MGMT >30: CPT | Performed by: INTERNAL MEDICINE

## 2017-01-01 PROCEDURE — 96361 HYDRATE IV INFUSION ADD-ON: CPT

## 2017-01-01 PROCEDURE — 78452 HT MUSCLE IMAGE SPECT MULT: CPT | Mod: 26 | Performed by: INTERNAL MEDICINE

## 2017-01-01 PROCEDURE — 40000671 ZZH STATISTIC ANESTHESIA CASE

## 2017-01-01 PROCEDURE — 92610 EVALUATE SWALLOWING FUNCTION: CPT | Mod: GN | Performed by: SPEECH-LANGUAGE PATHOLOGIST

## 2017-01-01 PROCEDURE — 25000132 ZZH RX MED GY IP 250 OP 250 PS 637: Mod: GY

## 2017-01-01 PROCEDURE — 92611 MOTION FLUOROSCOPY/SWALLOW: CPT | Mod: GN | Performed by: SPEECH-LANGUAGE PATHOLOGIST

## 2017-01-01 PROCEDURE — 84145 PROCALCITONIN (PCT): CPT | Performed by: EMERGENCY MEDICINE

## 2017-01-01 PROCEDURE — 99222 1ST HOSP IP/OBS MODERATE 55: CPT | Performed by: INTERNAL MEDICINE

## 2017-01-01 PROCEDURE — 83036 HEMOGLOBIN GLYCOSYLATED A1C: CPT | Performed by: HOSPITALIST

## 2017-01-01 PROCEDURE — 96360 HYDRATION IV INFUSION INIT: CPT

## 2017-01-01 PROCEDURE — 97535 SELF CARE MNGMENT TRAINING: CPT | Mod: GO

## 2017-01-01 PROCEDURE — 25000125 ZZHC RX 250: Performed by: INTERNAL MEDICINE

## 2017-01-01 PROCEDURE — 84300 ASSAY OF URINE SODIUM: CPT | Performed by: INTERNAL MEDICINE

## 2017-01-01 PROCEDURE — 93018 CV STRESS TEST I&R ONLY: CPT | Performed by: INTERNAL MEDICINE

## 2017-01-01 RX ORDER — ACETAMINOPHEN 160 MG
4000 TABLET,DISINTEGRATING ORAL DAILY
COMMUNITY
Start: 2016-04-11

## 2017-01-01 RX ORDER — AMOXICILLIN 250 MG
2 CAPSULE ORAL 2 TIMES DAILY PRN
Status: DISCONTINUED | OUTPATIENT
Start: 2017-01-01 | End: 2017-01-01 | Stop reason: HOSPADM

## 2017-01-01 RX ORDER — LIDOCAINE 40 MG/G
CREAM TOPICAL
Status: DISCONTINUED | OUTPATIENT
Start: 2017-01-01 | End: 2017-01-01 | Stop reason: CLARIF

## 2017-01-01 RX ORDER — ISOSORBIDE DINITRATE 10 MG/1
10 TABLET ORAL 3 TIMES DAILY
Status: DISCONTINUED | OUTPATIENT
Start: 2017-01-01 | End: 2017-01-01 | Stop reason: HOSPADM

## 2017-01-01 RX ORDER — LOSARTAN POTASSIUM 100 MG/1
100 TABLET ORAL DAILY
Status: ON HOLD | COMMUNITY
Start: 2017-01-01 | End: 2017-01-01

## 2017-01-01 RX ORDER — FUROSEMIDE 10 MG/ML
20 INJECTION INTRAMUSCULAR; INTRAVENOUS ONCE
Status: COMPLETED | OUTPATIENT
Start: 2017-01-01 | End: 2017-01-01

## 2017-01-01 RX ORDER — CLOPIDOGREL BISULFATE 75 MG/1
75 TABLET ORAL DAILY
Status: DISCONTINUED | OUTPATIENT
Start: 2017-01-01 | End: 2017-01-01 | Stop reason: HOSPADM

## 2017-01-01 RX ORDER — POTASSIUM CL/LIDO/0.9 % NACL 10MEQ/0.1L
10 INTRAVENOUS SOLUTION, PIGGYBACK (ML) INTRAVENOUS
Status: DISCONTINUED | OUTPATIENT
Start: 2017-01-01 | End: 2017-01-01 | Stop reason: HOSPADM

## 2017-01-01 RX ORDER — WARFARIN SODIUM 2 MG/1
2 TABLET ORAL DAILY
DISCHARGE
Start: 2017-01-01 | End: 2017-01-01

## 2017-01-01 RX ORDER — ALBUTEROL SULFATE 90 UG/1
2 AEROSOL, METERED RESPIRATORY (INHALATION) EVERY 5 MIN PRN
Status: DISCONTINUED | OUTPATIENT
Start: 2017-01-01 | End: 2017-01-01 | Stop reason: HOSPADM

## 2017-01-01 RX ORDER — NALOXONE HYDROCHLORIDE 0.4 MG/ML
.1-.4 INJECTION, SOLUTION INTRAMUSCULAR; INTRAVENOUS; SUBCUTANEOUS
Status: DISCONTINUED | OUTPATIENT
Start: 2017-01-01 | End: 2017-01-01 | Stop reason: HOSPADM

## 2017-01-01 RX ORDER — FUROSEMIDE 20 MG
20 TABLET ORAL DAILY
COMMUNITY
Start: 2017-01-01

## 2017-01-01 RX ORDER — POTASSIUM CHLORIDE 1.5 G/1.58G
20-40 POWDER, FOR SOLUTION ORAL
Status: DISCONTINUED | OUTPATIENT
Start: 2017-01-01 | End: 2017-01-01

## 2017-01-01 RX ORDER — BISACODYL 10 MG
10 SUPPOSITORY, RECTAL RECTAL DAILY PRN
Status: DISCONTINUED | OUTPATIENT
Start: 2017-01-01 | End: 2017-01-01 | Stop reason: HOSPADM

## 2017-01-01 RX ORDER — POTASSIUM CHLORIDE 29.8 MG/ML
20 INJECTION INTRAVENOUS
Status: DISCONTINUED | OUTPATIENT
Start: 2017-01-01 | End: 2017-01-01 | Stop reason: HOSPADM

## 2017-01-01 RX ORDER — FUROSEMIDE 10 MG/ML
20 INJECTION INTRAMUSCULAR; INTRAVENOUS
Status: DISCONTINUED | OUTPATIENT
Start: 2017-01-01 | End: 2017-01-01

## 2017-01-01 RX ORDER — POTASSIUM CL/LIDO/0.9 % NACL 10MEQ/0.1L
10 INTRAVENOUS SOLUTION, PIGGYBACK (ML) INTRAVENOUS
Status: DISCONTINUED | OUTPATIENT
Start: 2017-01-01 | End: 2017-01-01

## 2017-01-01 RX ORDER — DEXTROSE MONOHYDRATE 25 G/50ML
25-50 INJECTION, SOLUTION INTRAVENOUS
Status: DISCONTINUED | OUTPATIENT
Start: 2017-01-01 | End: 2017-01-01 | Stop reason: HOSPADM

## 2017-01-01 RX ORDER — SODIUM CHLORIDE 9 MG/ML
INJECTION, SOLUTION INTRAVENOUS CONTINUOUS
Status: CANCELLED | OUTPATIENT
Start: 2017-01-01

## 2017-01-01 RX ORDER — ONDANSETRON 2 MG/ML
4 INJECTION INTRAMUSCULAR; INTRAVENOUS EVERY 6 HOURS PRN
Status: DISCONTINUED | OUTPATIENT
Start: 2017-01-01 | End: 2017-01-01 | Stop reason: HOSPADM

## 2017-01-01 RX ORDER — ONDANSETRON 4 MG/1
4 TABLET, ORALLY DISINTEGRATING ORAL EVERY 6 HOURS PRN
Status: DISCONTINUED | OUTPATIENT
Start: 2017-01-01 | End: 2017-01-01 | Stop reason: HOSPADM

## 2017-01-01 RX ORDER — SYRINGE-NEEDLE,INSULIN,0.5 ML 28GX1/2"
SYRINGE, EMPTY DISPOSABLE MISCELLANEOUS
COMMUNITY
Start: 2011-09-16

## 2017-01-01 RX ORDER — ALUMINA, MAGNESIA, AND SIMETHICONE 2400; 2400; 240 MG/30ML; MG/30ML; MG/30ML
30 SUSPENSION ORAL EVERY 4 HOURS PRN
Status: DISCONTINUED | OUTPATIENT
Start: 2017-01-01 | End: 2017-01-01 | Stop reason: HOSPADM

## 2017-01-01 RX ORDER — AMOXICILLIN 250 MG
1 CAPSULE ORAL 2 TIMES DAILY PRN
Status: DISCONTINUED | OUTPATIENT
Start: 2017-01-01 | End: 2017-01-01 | Stop reason: HOSPADM

## 2017-01-01 RX ORDER — NICOTINE POLACRILEX 4 MG
15-30 LOZENGE BUCCAL
Status: DISCONTINUED | OUTPATIENT
Start: 2017-01-01 | End: 2017-01-01 | Stop reason: HOSPADM

## 2017-01-01 RX ORDER — ISOSORBIDE DINITRATE 10 MG/1
10 TABLET ORAL 3 TIMES DAILY
DISCHARGE
Start: 2017-01-01

## 2017-01-01 RX ORDER — NITROGLYCERIN 0.4 MG/1
0.4 TABLET SUBLINGUAL EVERY 5 MIN PRN
Status: DISCONTINUED | OUTPATIENT
Start: 2017-01-01 | End: 2017-01-01 | Stop reason: HOSPADM

## 2017-01-01 RX ORDER — POTASSIUM CHLORIDE 7.45 MG/ML
10 INJECTION INTRAVENOUS
Status: DISCONTINUED | OUTPATIENT
Start: 2017-01-01 | End: 2017-01-01

## 2017-01-01 RX ORDER — WARFARIN SODIUM 5 MG/1
5 TABLET ORAL
Status: COMPLETED | OUTPATIENT
Start: 2017-01-01 | End: 2017-01-01

## 2017-01-01 RX ORDER — PROCHLORPERAZINE MALEATE 5 MG
5 TABLET ORAL EVERY 6 HOURS PRN
Status: DISCONTINUED | OUTPATIENT
Start: 2017-01-01 | End: 2017-01-01 | Stop reason: HOSPADM

## 2017-01-01 RX ORDER — HYDRALAZINE HYDROCHLORIDE 10 MG/1
20 TABLET, FILM COATED ORAL EVERY 8 HOURS SCHEDULED
Status: DISCONTINUED | OUTPATIENT
Start: 2017-01-01 | End: 2017-01-01 | Stop reason: HOSPADM

## 2017-01-01 RX ORDER — POTASSIUM CHLORIDE 7.45 MG/ML
10 INJECTION INTRAVENOUS
Status: DISCONTINUED | OUTPATIENT
Start: 2017-01-01 | End: 2017-01-01 | Stop reason: HOSPADM

## 2017-01-01 RX ORDER — HYDRALAZINE HYDROCHLORIDE 10 MG/1
10 TABLET, FILM COATED ORAL EVERY 8 HOURS SCHEDULED
Status: DISCONTINUED | OUTPATIENT
Start: 2017-01-01 | End: 2017-01-01

## 2017-01-01 RX ORDER — POTASSIUM CHLORIDE 1500 MG/1
20-40 TABLET, EXTENDED RELEASE ORAL
Status: DISCONTINUED | OUTPATIENT
Start: 2017-01-01 | End: 2017-01-01

## 2017-01-01 RX ORDER — NITROGLYCERIN 0.4 MG/1
0.4 TABLET SUBLINGUAL EVERY 5 MIN PRN
Status: DISCONTINUED | OUTPATIENT
Start: 2017-01-01 | End: 2017-01-01

## 2017-01-01 RX ORDER — POTASSIUM CHLORIDE 29.8 MG/ML
20 INJECTION INTRAVENOUS
Status: DISCONTINUED | OUTPATIENT
Start: 2017-01-01 | End: 2017-01-01

## 2017-01-01 RX ORDER — POTASSIUM CHLORIDE 1500 MG/1
20-40 TABLET, EXTENDED RELEASE ORAL
Status: DISCONTINUED | OUTPATIENT
Start: 2017-01-01 | End: 2017-01-01 | Stop reason: HOSPADM

## 2017-01-01 RX ORDER — LIDOCAINE 40 MG/G
CREAM TOPICAL
Status: DISCONTINUED | OUTPATIENT
Start: 2017-01-01 | End: 2017-01-01 | Stop reason: HOSPADM

## 2017-01-01 RX ORDER — FLUTICASONE PROPIONATE 50 MCG
1 SPRAY, SUSPENSION (ML) NASAL DAILY
Status: DISCONTINUED | OUTPATIENT
Start: 2017-01-01 | End: 2017-01-01 | Stop reason: HOSPADM

## 2017-01-01 RX ORDER — WARFARIN SODIUM 7.5 MG/1
7.5 TABLET ORAL
Status: COMPLETED | OUTPATIENT
Start: 2017-01-01 | End: 2017-01-01

## 2017-01-01 RX ORDER — FUROSEMIDE 10 MG/ML
40 INJECTION INTRAMUSCULAR; INTRAVENOUS
Status: DISCONTINUED | OUTPATIENT
Start: 2017-01-01 | End: 2017-01-01

## 2017-01-01 RX ORDER — SODIUM CHLORIDE, SODIUM LACTATE, POTASSIUM CHLORIDE, CALCIUM CHLORIDE 600; 310; 30; 20 MG/100ML; MG/100ML; MG/100ML; MG/100ML
INJECTION, SOLUTION INTRAVENOUS CONTINUOUS
Status: DISCONTINUED | OUTPATIENT
Start: 2017-01-01 | End: 2017-01-01

## 2017-01-01 RX ORDER — CLOPIDOGREL BISULFATE 75 MG/1
75 TABLET ORAL DAILY
COMMUNITY
Start: 2017-01-01

## 2017-01-01 RX ORDER — SODIUM CHLORIDE 9 MG/ML
1000 INJECTION, SOLUTION INTRAVENOUS CONTINUOUS
Status: DISCONTINUED | OUTPATIENT
Start: 2017-01-01 | End: 2017-01-01

## 2017-01-01 RX ORDER — TRAZODONE HYDROCHLORIDE 50 MG/1
50 TABLET, FILM COATED ORAL
Status: DISCONTINUED | OUTPATIENT
Start: 2017-01-01 | End: 2017-01-01 | Stop reason: HOSPADM

## 2017-01-01 RX ORDER — AMINOPHYLLINE 25 MG/ML
50-100 INJECTION, SOLUTION INTRAVENOUS
Status: DISCONTINUED | OUTPATIENT
Start: 2017-01-01 | End: 2017-01-01 | Stop reason: HOSPADM

## 2017-01-01 RX ORDER — WARFARIN SODIUM 5 MG/1
TABLET ORAL EVERY EVENING
Status: ON HOLD | COMMUNITY
Start: 2017-01-01 | End: 2017-01-01

## 2017-01-01 RX ORDER — ACYCLOVIR 200 MG/1
0-1 CAPSULE ORAL
Status: DISCONTINUED | OUTPATIENT
Start: 2017-01-01 | End: 2017-01-01 | Stop reason: HOSPADM

## 2017-01-01 RX ORDER — ACETAMINOPHEN 325 MG/1
325 TABLET ORAL EVERY 4 HOURS PRN
COMMUNITY
Start: 2010-05-11

## 2017-01-01 RX ORDER — LIDOCAINE 40 MG/G
CREAM TOPICAL
Status: CANCELLED | OUTPATIENT
Start: 2017-01-01

## 2017-01-01 RX ORDER — FUROSEMIDE 10 MG/ML
INJECTION INTRAMUSCULAR; INTRAVENOUS
Status: COMPLETED
Start: 2017-01-01 | End: 2017-01-01

## 2017-01-01 RX ORDER — NITROGLYCERIN 0.4 MG/1
TABLET SUBLINGUAL
Qty: 25 TABLET | DISCHARGE
Start: 2017-01-01

## 2017-01-01 RX ORDER — ACETAMINOPHEN 325 MG/1
325 TABLET ORAL EVERY 4 HOURS PRN
Status: DISCONTINUED | OUTPATIENT
Start: 2017-01-01 | End: 2017-01-01 | Stop reason: HOSPADM

## 2017-01-01 RX ORDER — PROCHLORPERAZINE 25 MG
12.5 SUPPOSITORY, RECTAL RECTAL EVERY 12 HOURS PRN
Status: DISCONTINUED | OUTPATIENT
Start: 2017-01-01 | End: 2017-01-01 | Stop reason: HOSPADM

## 2017-01-01 RX ORDER — REGADENOSON 0.08 MG/ML
0.4 INJECTION, SOLUTION INTRAVENOUS ONCE
Status: COMPLETED | OUTPATIENT
Start: 2017-01-01 | End: 2017-01-01

## 2017-01-01 RX ORDER — HYDRALAZINE HYDROCHLORIDE 10 MG/1
20 TABLET, FILM COATED ORAL 3 TIMES DAILY
Qty: 60 TABLET | DISCHARGE
Start: 2017-01-01

## 2017-01-01 RX ORDER — FUROSEMIDE 20 MG
20 TABLET ORAL DAILY
Status: DISCONTINUED | OUTPATIENT
Start: 2017-01-01 | End: 2017-01-01

## 2017-01-01 RX ORDER — HEPARIN SODIUM,PORCINE 10 UNIT/ML
2-5 VIAL (ML) INTRAVENOUS
Status: DISCONTINUED | OUTPATIENT
Start: 2017-01-01 | End: 2017-01-01 | Stop reason: HOSPADM

## 2017-01-01 RX ORDER — AMOXICILLIN 250 MG
1 CAPSULE ORAL 2 TIMES DAILY PRN
Status: DISCONTINUED | OUTPATIENT
Start: 2017-01-01 | End: 2017-01-01

## 2017-01-01 RX ORDER — POTASSIUM CHLORIDE 1500 MG/1
20 TABLET, EXTENDED RELEASE ORAL
Status: CANCELLED | OUTPATIENT
Start: 2017-01-01

## 2017-01-01 RX ORDER — POTASSIUM CHLORIDE 1.5 G/1.58G
20-40 POWDER, FOR SOLUTION ORAL
Status: DISCONTINUED | OUTPATIENT
Start: 2017-01-01 | End: 2017-01-01 | Stop reason: HOSPADM

## 2017-01-01 RX ORDER — HYDRALAZINE HYDROCHLORIDE 10 MG/1
20 TABLET, FILM COATED ORAL 3 TIMES DAILY
Status: DISCONTINUED | OUTPATIENT
Start: 2017-01-01 | End: 2017-01-01 | Stop reason: HOSPADM

## 2017-01-01 RX ORDER — FLUTICASONE PROPIONATE 50 MCG
1 SPRAY, SUSPENSION (ML) NASAL DAILY
Qty: 1 BOTTLE | DISCHARGE
Start: 2017-01-01

## 2017-01-01 RX ORDER — AMOXICILLIN 250 MG
2 CAPSULE ORAL 2 TIMES DAILY PRN
Status: DISCONTINUED | OUTPATIENT
Start: 2017-01-01 | End: 2017-01-01

## 2017-01-01 RX ORDER — FUROSEMIDE 10 MG/ML
20 INJECTION INTRAMUSCULAR; INTRAVENOUS
Status: COMPLETED | OUTPATIENT
Start: 2017-01-01 | End: 2017-01-01

## 2017-01-01 RX ORDER — ACETAMINOPHEN 325 MG/1
650 TABLET ORAL EVERY 4 HOURS PRN
Status: DISCONTINUED | OUTPATIENT
Start: 2017-01-01 | End: 2017-01-01 | Stop reason: HOSPADM

## 2017-01-01 RX ORDER — WARFARIN SODIUM 2 MG/1
2 TABLET ORAL DAILY
Qty: 30 TABLET | DISCHARGE
Start: 2017-01-01

## 2017-01-01 RX ORDER — ACETAMINOPHEN 325 MG/1
650 TABLET ORAL EVERY 4 HOURS PRN
Status: DISCONTINUED | OUTPATIENT
Start: 2017-01-01 | End: 2017-01-01

## 2017-01-01 RX ORDER — WARFARIN SODIUM 1 MG/1
1 TABLET ORAL
Status: COMPLETED | OUTPATIENT
Start: 2017-01-01 | End: 2017-01-01

## 2017-01-01 RX ADMIN — INSULIN GLARGINE 5 UNITS: 100 INJECTION, SOLUTION SUBCUTANEOUS at 09:18

## 2017-01-01 RX ADMIN — RANITIDINE 150 MG: 150 TABLET ORAL at 21:48

## 2017-01-01 RX ADMIN — FUROSEMIDE 40 MG: 10 INJECTION, SOLUTION INTRAVENOUS at 17:21

## 2017-01-01 RX ADMIN — SODIUM CHLORIDE, POTASSIUM CHLORIDE, SODIUM LACTATE AND CALCIUM CHLORIDE 2340 ML: 600; 310; 30; 20 INJECTION, SOLUTION INTRAVENOUS at 11:31

## 2017-01-01 RX ADMIN — ISOSORBIDE DINITRATE 10 MG: 10 TABLET ORAL at 08:54

## 2017-01-01 RX ADMIN — INSULIN GLARGINE 20 UNITS: 100 INJECTION, SOLUTION SUBCUTANEOUS at 11:50

## 2017-01-01 RX ADMIN — RANITIDINE 150 MG: 150 TABLET ORAL at 21:32

## 2017-01-01 RX ADMIN — ISOSORBIDE DINITRATE 10 MG: 10 TABLET ORAL at 00:58

## 2017-01-01 RX ADMIN — WARFARIN SODIUM 1 MG: 1 TABLET ORAL at 18:33

## 2017-01-01 RX ADMIN — HYDRALAZINE HYDROCHLORIDE 20 MG: 10 TABLET ORAL at 15:42

## 2017-01-01 RX ADMIN — HEPARIN SODIUM 850 UNITS/HR: 10000 INJECTION, SOLUTION INTRAVENOUS at 11:45

## 2017-01-01 RX ADMIN — FUROSEMIDE 20 MG: 10 INJECTION, SOLUTION INTRAVENOUS at 08:43

## 2017-01-01 RX ADMIN — FUROSEMIDE 40 MG: 10 INJECTION, SOLUTION INTRAVENOUS at 16:34

## 2017-01-01 RX ADMIN — Medication 4300 UNITS: at 11:46

## 2017-01-01 RX ADMIN — SODIUM CHLORIDE 1000 ML: 9 INJECTION, SOLUTION INTRAVENOUS at 14:41

## 2017-01-01 RX ADMIN — TAZOBACTAM SODIUM AND PIPERACILLIN SODIUM 3.38 G: 375; 3 INJECTION, SOLUTION INTRAVENOUS at 01:53

## 2017-01-01 RX ADMIN — TETROFOSMIN 10.2 MCI.: 1.38 INJECTION, POWDER, LYOPHILIZED, FOR SOLUTION INTRAVENOUS at 11:16

## 2017-01-01 RX ADMIN — INSULIN ASPART 1 UNITS: 100 INJECTION, SOLUTION INTRAVENOUS; SUBCUTANEOUS at 14:19

## 2017-01-01 RX ADMIN — ISOSORBIDE DINITRATE 10 MG: 10 TABLET ORAL at 22:08

## 2017-01-01 RX ADMIN — FUROSEMIDE 60 MG: 20 TABLET ORAL at 21:44

## 2017-01-01 RX ADMIN — ISOSORBIDE DINITRATE 10 MG: 10 TABLET ORAL at 22:30

## 2017-01-01 RX ADMIN — RANITIDINE 150 MG: 150 TABLET ORAL at 21:28

## 2017-01-01 RX ADMIN — CLOPIDOGREL 75 MG: 75 TABLET, FILM COATED ORAL at 08:08

## 2017-01-01 RX ADMIN — FUROSEMIDE 40 MG: 10 INJECTION, SOLUTION INTRAVENOUS at 08:25

## 2017-01-01 RX ADMIN — FLUTICASONE PROPIONATE 1 SPRAY: 50 SPRAY, METERED NASAL at 09:38

## 2017-01-01 RX ADMIN — HYDRALAZINE HYDROCHLORIDE 20 MG: 10 TABLET ORAL at 08:46

## 2017-01-01 RX ADMIN — INSULIN ASPART 2 UNITS: 100 INJECTION, SOLUTION INTRAVENOUS; SUBCUTANEOUS at 17:46

## 2017-01-01 RX ADMIN — Medication 3 MG: at 21:32

## 2017-01-01 RX ADMIN — HYDRALAZINE HYDROCHLORIDE 20 MG: 10 TABLET ORAL at 14:05

## 2017-01-01 RX ADMIN — INSULIN GLARGINE 5 UNITS: 100 INJECTION, SOLUTION SUBCUTANEOUS at 09:22

## 2017-01-01 RX ADMIN — ISOSORBIDE DINITRATE 10 MG: 10 TABLET ORAL at 09:20

## 2017-01-01 RX ADMIN — INSULIN ASPART 1 UNITS: 100 INJECTION, SOLUTION INTRAVENOUS; SUBCUTANEOUS at 16:34

## 2017-01-01 RX ADMIN — VITAMIN D, TAB 1000IU (100/BT) 4000 UNITS: 25 TAB at 09:22

## 2017-01-01 RX ADMIN — CLOPIDOGREL 75 MG: 75 TABLET, FILM COATED ORAL at 09:34

## 2017-01-01 RX ADMIN — FUROSEMIDE 20 MG: 10 INJECTION, SOLUTION INTRAVENOUS at 11:11

## 2017-01-01 RX ADMIN — INSULIN GLARGINE 20 UNITS: 100 INJECTION, SOLUTION SUBCUTANEOUS at 12:52

## 2017-01-01 RX ADMIN — HYDRALAZINE HYDROCHLORIDE 20 MG: 10 TABLET ORAL at 21:48

## 2017-01-01 RX ADMIN — FUROSEMIDE 40 MG: 10 INJECTION, SOLUTION INTRAVENOUS at 09:22

## 2017-01-01 RX ADMIN — VITAMIN D, TAB 1000IU (100/BT) 4000 UNITS: 25 TAB at 08:46

## 2017-01-01 RX ADMIN — CLOPIDOGREL 75 MG: 75 TABLET, FILM COATED ORAL at 08:46

## 2017-01-01 RX ADMIN — TETROFOSMIN 33.9 MCI.: 1.38 INJECTION, POWDER, LYOPHILIZED, FOR SOLUTION INTRAVENOUS at 13:42

## 2017-01-01 RX ADMIN — FUROSEMIDE 20 MG: 10 INJECTION, SOLUTION INTRAVENOUS at 20:43

## 2017-01-01 RX ADMIN — HYDRALAZINE HYDROCHLORIDE 20 MG: 10 TABLET ORAL at 14:16

## 2017-01-01 RX ADMIN — HYDRALAZINE HYDROCHLORIDE 20 MG: 10 TABLET ORAL at 06:28

## 2017-01-01 RX ADMIN — HYDRALAZINE HYDROCHLORIDE 20 MG: 10 TABLET ORAL at 22:09

## 2017-01-01 RX ADMIN — TAZOBACTAM SODIUM AND PIPERACILLIN SODIUM 3.38 G: 375; 3 INJECTION, SOLUTION INTRAVENOUS at 03:43

## 2017-01-01 RX ADMIN — ISOSORBIDE DINITRATE 10 MG: 10 TABLET ORAL at 09:22

## 2017-01-01 RX ADMIN — ISOSORBIDE DINITRATE 10 MG: 10 TABLET ORAL at 18:46

## 2017-01-01 RX ADMIN — RANITIDINE 150 MG: 150 TABLET ORAL at 22:06

## 2017-01-01 RX ADMIN — INSULIN ASPART 2 UNITS: 100 INJECTION, SOLUTION INTRAVENOUS; SUBCUTANEOUS at 12:18

## 2017-01-01 RX ADMIN — ISOSORBIDE DINITRATE 10 MG: 10 TABLET ORAL at 16:35

## 2017-01-01 RX ADMIN — FLUTICASONE PROPIONATE 1 SPRAY: 50 SPRAY, METERED NASAL at 08:16

## 2017-01-01 RX ADMIN — ISOSORBIDE DINITRATE 10 MG: 10 TABLET ORAL at 08:08

## 2017-01-01 RX ADMIN — HYDRALAZINE HYDROCHLORIDE 20 MG: 10 TABLET ORAL at 21:28

## 2017-01-01 RX ADMIN — RANITIDINE 150 MG: 150 TABLET ORAL at 00:57

## 2017-01-01 RX ADMIN — ISOSORBIDE DINITRATE 10 MG: 10 TABLET ORAL at 08:47

## 2017-01-01 RX ADMIN — HYDRALAZINE HYDROCHLORIDE 20 MG: 10 TABLET ORAL at 21:50

## 2017-01-01 RX ADMIN — ISOSORBIDE DINITRATE 10 MG: 10 TABLET ORAL at 21:32

## 2017-01-01 RX ADMIN — HYDRALAZINE HYDROCHLORIDE 10 MG: 10 TABLET ORAL at 06:07

## 2017-01-01 RX ADMIN — TAZOBACTAM SODIUM AND PIPERACILLIN SODIUM 3.38 G: 375; 3 INJECTION, SOLUTION INTRAVENOUS at 12:19

## 2017-01-01 RX ADMIN — INSULIN ASPART 1 UNITS: 100 INJECTION, SOLUTION INTRAVENOUS; SUBCUTANEOUS at 17:47

## 2017-01-01 RX ADMIN — INSULIN ASPART 2 UNITS: 100 INJECTION, SOLUTION INTRAVENOUS; SUBCUTANEOUS at 17:51

## 2017-01-01 RX ADMIN — INSULIN GLARGINE 5 UNITS: 100 INJECTION, SOLUTION SUBCUTANEOUS at 09:38

## 2017-01-01 RX ADMIN — ISOSORBIDE DINITRATE 10 MG: 10 TABLET ORAL at 09:35

## 2017-01-01 RX ADMIN — ISOSORBIDE DINITRATE 10 MG: 10 TABLET ORAL at 21:56

## 2017-01-01 RX ADMIN — WARFARIN SODIUM 5 MG: 5 TABLET ORAL at 17:14

## 2017-01-01 RX ADMIN — HYDRALAZINE HYDROCHLORIDE 20 MG: 10 TABLET ORAL at 21:01

## 2017-01-01 RX ADMIN — INSULIN GLARGINE 10 UNITS: 100 INJECTION, SOLUTION SUBCUTANEOUS at 14:03

## 2017-01-01 RX ADMIN — TAZOBACTAM SODIUM AND PIPERACILLIN SODIUM 3.38 G: 375; 3 INJECTION, SOLUTION INTRAVENOUS at 08:12

## 2017-01-01 RX ADMIN — HYDRALAZINE HYDROCHLORIDE 10 MG: 10 TABLET ORAL at 22:23

## 2017-01-01 RX ADMIN — INSULIN GLARGINE 20 UNITS: 100 INJECTION, SOLUTION SUBCUTANEOUS at 12:33

## 2017-01-01 RX ADMIN — INSULIN ASPART 1 UNITS: 100 INJECTION, SOLUTION INTRAVENOUS; SUBCUTANEOUS at 09:28

## 2017-01-01 RX ADMIN — INSULIN GLARGINE 5 UNITS: 100 INJECTION, SOLUTION SUBCUTANEOUS at 12:14

## 2017-01-01 RX ADMIN — HYDRALAZINE HYDROCHLORIDE 20 MG: 10 TABLET ORAL at 21:32

## 2017-01-01 RX ADMIN — HYDRALAZINE HYDROCHLORIDE 20 MG: 10 TABLET ORAL at 06:15

## 2017-01-01 RX ADMIN — TRAZODONE HYDROCHLORIDE 50 MG: 50 TABLET ORAL at 02:18

## 2017-01-01 RX ADMIN — ISOSORBIDE DINITRATE 10 MG: 10 TABLET ORAL at 21:28

## 2017-01-01 RX ADMIN — INSULIN ASPART 3 UNITS: 100 INJECTION, SOLUTION INTRAVENOUS; SUBCUTANEOUS at 09:39

## 2017-01-01 RX ADMIN — INSULIN ASPART 1 UNITS: 100 INJECTION, SOLUTION INTRAVENOUS; SUBCUTANEOUS at 17:18

## 2017-01-01 RX ADMIN — FLUTICASONE PROPIONATE 1 SPRAY: 50 SPRAY, METERED NASAL at 17:45

## 2017-01-01 RX ADMIN — ISOSORBIDE DINITRATE 10 MG: 10 TABLET ORAL at 10:25

## 2017-01-01 RX ADMIN — Medication 3 MG: at 22:07

## 2017-01-01 RX ADMIN — RANITIDINE 150 MG: 150 TABLET ORAL at 21:44

## 2017-01-01 RX ADMIN — CLOPIDOGREL 75 MG: 75 TABLET, FILM COATED ORAL at 08:43

## 2017-01-01 RX ADMIN — FUROSEMIDE 20 MG: 10 INJECTION, SOLUTION INTRAVENOUS at 09:56

## 2017-01-01 RX ADMIN — ISOSORBIDE DINITRATE 10 MG: 10 TABLET ORAL at 15:54

## 2017-01-01 RX ADMIN — HYDRALAZINE HYDROCHLORIDE 20 MG: 10 TABLET ORAL at 18:46

## 2017-01-01 RX ADMIN — INSULIN ASPART 3 UNITS: 100 INJECTION, SOLUTION INTRAVENOUS; SUBCUTANEOUS at 09:28

## 2017-01-01 RX ADMIN — FLUTICASONE PROPIONATE 1 SPRAY: 50 SPRAY, METERED NASAL at 09:22

## 2017-01-01 RX ADMIN — TAZOBACTAM SODIUM AND PIPERACILLIN SODIUM 3.38 G: 375; 3 INJECTION, SOLUTION INTRAVENOUS at 20:49

## 2017-01-01 RX ADMIN — ISOSORBIDE DINITRATE 10 MG: 10 TABLET ORAL at 17:43

## 2017-01-01 RX ADMIN — INSULIN ASPART 1 UNITS: 100 INJECTION, SOLUTION INTRAVENOUS; SUBCUTANEOUS at 19:27

## 2017-01-01 RX ADMIN — INSULIN GLARGINE 5 UNITS: 100 INJECTION, SOLUTION SUBCUTANEOUS at 09:23

## 2017-01-01 RX ADMIN — ISOSORBIDE DINITRATE 10 MG: 10 TABLET ORAL at 16:41

## 2017-01-01 RX ADMIN — FUROSEMIDE 40 MG: 10 INJECTION, SOLUTION INTRAVENOUS at 18:03

## 2017-01-01 RX ADMIN — RANITIDINE 150 MG: 150 TABLET ORAL at 22:30

## 2017-01-01 RX ADMIN — ISOSORBIDE DINITRATE 10 MG: 10 TABLET ORAL at 17:14

## 2017-01-01 RX ADMIN — FLUTICASONE PROPIONATE 1 SPRAY: 50 SPRAY, METERED NASAL at 09:17

## 2017-01-01 RX ADMIN — INSULIN ASPART 1 UNITS: 100 INJECTION, SOLUTION INTRAVENOUS; SUBCUTANEOUS at 12:32

## 2017-01-01 RX ADMIN — CLOPIDOGREL 75 MG: 75 TABLET, FILM COATED ORAL at 09:37

## 2017-01-01 RX ADMIN — INSULIN ASPART 1 UNITS: 100 INJECTION, SOLUTION INTRAVENOUS; SUBCUTANEOUS at 17:10

## 2017-01-01 RX ADMIN — RANITIDINE 150 MG: 150 TABLET ORAL at 21:01

## 2017-01-01 RX ADMIN — ISOSORBIDE DINITRATE 10 MG: 10 TABLET ORAL at 21:46

## 2017-01-01 RX ADMIN — HYDRALAZINE HYDROCHLORIDE 20 MG: 10 TABLET ORAL at 21:56

## 2017-01-01 RX ADMIN — HYDRALAZINE HYDROCHLORIDE 20 MG: 10 TABLET ORAL at 05:41

## 2017-01-01 RX ADMIN — WARFARIN SODIUM 7.5 MG: 7.5 TABLET ORAL at 18:21

## 2017-01-01 RX ADMIN — ISOSORBIDE DINITRATE 10 MG: 10 TABLET ORAL at 16:55

## 2017-01-01 RX ADMIN — CLOPIDOGREL 75 MG: 75 TABLET, FILM COATED ORAL at 09:22

## 2017-01-01 RX ADMIN — INSULIN HUMAN 13 UNITS: 100 INJECTION, SUSPENSION SUBCUTANEOUS at 17:11

## 2017-01-01 RX ADMIN — FUROSEMIDE 20 MG: 10 INJECTION, SOLUTION INTRAVENOUS at 16:40

## 2017-01-01 RX ADMIN — HEPARIN SODIUM 600 UNITS/HR: 10000 INJECTION, SOLUTION INTRAVENOUS at 16:54

## 2017-01-01 RX ADMIN — INSULIN ASPART 1 UNITS: 100 INJECTION, SOLUTION INTRAVENOUS; SUBCUTANEOUS at 09:18

## 2017-01-01 RX ADMIN — FLUTICASONE PROPIONATE 1 SPRAY: 50 SPRAY, METERED NASAL at 08:49

## 2017-01-01 RX ADMIN — TAZOBACTAM SODIUM AND PIPERACILLIN SODIUM 3.38 G: 375; 3 INJECTION, SOLUTION INTRAVENOUS at 20:58

## 2017-01-01 RX ADMIN — INSULIN ASPART 3 UNITS: 100 INJECTION, SOLUTION INTRAVENOUS; SUBCUTANEOUS at 12:51

## 2017-01-01 RX ADMIN — INSULIN ASPART 2 UNITS: 100 INJECTION, SOLUTION INTRAVENOUS; SUBCUTANEOUS at 17:18

## 2017-01-01 RX ADMIN — INSULIN ASPART 1 UNITS: 100 INJECTION, SOLUTION INTRAVENOUS; SUBCUTANEOUS at 12:20

## 2017-01-01 RX ADMIN — CLOPIDOGREL 75 MG: 75 TABLET, FILM COATED ORAL at 08:54

## 2017-01-01 RX ADMIN — FUROSEMIDE 40 MG: 10 INJECTION, SOLUTION INTRAVENOUS at 18:47

## 2017-01-01 RX ADMIN — ISOSORBIDE DINITRATE 10 MG: 10 TABLET ORAL at 21:01

## 2017-01-01 RX ADMIN — FUROSEMIDE 20 MG: 10 INJECTION, SOLUTION INTRAVENOUS at 08:13

## 2017-01-01 RX ADMIN — HYDRALAZINE HYDROCHLORIDE 20 MG: 10 TABLET ORAL at 00:56

## 2017-01-01 RX ADMIN — FUROSEMIDE 40 MG: 10 INJECTION, SOLUTION INTRAVENOUS at 16:21

## 2017-01-01 RX ADMIN — TAZOBACTAM SODIUM AND PIPERACILLIN SODIUM 3.38 G: 375; 3 INJECTION, SOLUTION INTRAVENOUS at 15:07

## 2017-01-01 RX ADMIN — ISOSORBIDE DINITRATE 10 MG: 10 TABLET ORAL at 17:50

## 2017-01-01 RX ADMIN — FUROSEMIDE 20 MG: 10 INJECTION, SOLUTION INTRAVENOUS at 11:21

## 2017-01-01 RX ADMIN — INSULIN GLARGINE 20 UNITS: 100 INJECTION, SOLUTION SUBCUTANEOUS at 12:47

## 2017-01-01 RX ADMIN — FLUTICASONE PROPIONATE 1 SPRAY: 50 SPRAY, METERED NASAL at 09:19

## 2017-01-01 RX ADMIN — HYDRALAZINE HYDROCHLORIDE 20 MG: 10 TABLET ORAL at 11:42

## 2017-01-01 RX ADMIN — HYDRALAZINE HYDROCHLORIDE 20 MG: 10 TABLET ORAL at 06:30

## 2017-01-01 RX ADMIN — HYDRALAZINE HYDROCHLORIDE 20 MG: 10 TABLET ORAL at 16:35

## 2017-01-01 RX ADMIN — HYDRALAZINE HYDROCHLORIDE 10 MG: 10 TABLET ORAL at 15:10

## 2017-01-01 RX ADMIN — CLOPIDOGREL 75 MG: 75 TABLET, FILM COATED ORAL at 10:25

## 2017-01-01 RX ADMIN — HYDRALAZINE HYDROCHLORIDE 20 MG: 10 TABLET ORAL at 13:25

## 2017-01-01 RX ADMIN — INSULIN ASPART 1 UNITS: 100 INJECTION, SOLUTION INTRAVENOUS; SUBCUTANEOUS at 11:47

## 2017-01-01 RX ADMIN — SODIUM CHLORIDE: 9 INJECTION, SOLUTION INTRAVENOUS at 14:49

## 2017-01-01 RX ADMIN — ISOSORBIDE DINITRATE 10 MG: 10 TABLET ORAL at 18:03

## 2017-01-01 RX ADMIN — CLOPIDOGREL 75 MG: 75 TABLET, FILM COATED ORAL at 08:47

## 2017-01-01 RX ADMIN — ISOSORBIDE DINITRATE 10 MG: 10 TABLET ORAL at 21:45

## 2017-01-01 RX ADMIN — ACETAMINOPHEN 325 MG: 325 TABLET, FILM COATED ORAL at 18:38

## 2017-01-01 RX ADMIN — WARFARIN SODIUM 7.5 MG: 7.5 TABLET ORAL at 17:25

## 2017-01-01 RX ADMIN — INSULIN ASPART 3 UNITS: 100 INJECTION, SOLUTION INTRAVENOUS; SUBCUTANEOUS at 12:12

## 2017-01-01 RX ADMIN — ISOSORBIDE DINITRATE 10 MG: 10 TABLET ORAL at 16:59

## 2017-01-01 RX ADMIN — HYDRALAZINE HYDROCHLORIDE 20 MG: 10 TABLET ORAL at 22:30

## 2017-01-01 RX ADMIN — HYDRALAZINE HYDROCHLORIDE 20 MG: 10 TABLET ORAL at 14:03

## 2017-01-01 RX ADMIN — REGADENOSON 0.4 MG: 0.08 INJECTION, SOLUTION INTRAVENOUS at 13:36

## 2017-01-01 RX ADMIN — CLOPIDOGREL 75 MG: 75 TABLET, FILM COATED ORAL at 09:23

## 2017-01-01 RX ADMIN — HYDRALAZINE HYDROCHLORIDE 20 MG: 10 TABLET ORAL at 14:53

## 2017-01-01 RX ADMIN — INSULIN ASPART 3 UNITS: 100 INJECTION, SOLUTION INTRAVENOUS; SUBCUTANEOUS at 09:19

## 2017-01-01 RX ADMIN — ISOSORBIDE DINITRATE 10 MG: 10 TABLET ORAL at 12:51

## 2017-01-01 RX ADMIN — WARFARIN SODIUM 5 MG: 5 TABLET ORAL at 18:34

## 2017-01-01 RX ADMIN — HYDRALAZINE HYDROCHLORIDE 20 MG: 10 TABLET ORAL at 06:35

## 2017-01-01 RX ADMIN — INSULIN ASPART 2 UNITS: 100 INJECTION, SOLUTION INTRAVENOUS; SUBCUTANEOUS at 12:48

## 2017-01-01 RX ADMIN — FLUTICASONE PROPIONATE 1 SPRAY: 50 SPRAY, METERED NASAL at 09:35

## 2017-01-01 RX ADMIN — CLOPIDOGREL 75 MG: 75 TABLET, FILM COATED ORAL at 07:57

## 2017-01-01 RX ADMIN — FUROSEMIDE 60 MG: 20 TABLET ORAL at 09:23

## 2017-01-01 RX ADMIN — INSULIN GLARGINE 20 UNITS: 100 INJECTION, SOLUTION SUBCUTANEOUS at 13:21

## 2017-01-01 RX ADMIN — HYDRALAZINE HYDROCHLORIDE 20 MG: 10 TABLET ORAL at 13:50

## 2017-01-01 RX ADMIN — ISOSORBIDE DINITRATE 10 MG: 10 TABLET ORAL at 08:46

## 2017-01-01 RX ADMIN — HYDRALAZINE HYDROCHLORIDE 20 MG: 10 TABLET ORAL at 06:33

## 2017-01-01 RX ADMIN — ISOSORBIDE DINITRATE 10 MG: 10 TABLET ORAL at 08:51

## 2017-01-01 RX ADMIN — Medication 3 MG: at 21:43

## 2017-01-01 RX ADMIN — LIDOCAINE HYDROCHLORIDE 3 ML: 10 INJECTION, SOLUTION INFILTRATION; PERINEURAL at 18:00

## 2017-01-01 RX ADMIN — RANITIDINE 150 MG: 150 TABLET ORAL at 22:08

## 2017-01-01 RX ADMIN — INSULIN GLARGINE 20 UNITS: 100 INJECTION, SOLUTION SUBCUTANEOUS at 12:28

## 2017-01-01 RX ADMIN — SODIUM CHLORIDE 50 ML/HR: 9 INJECTION, SOLUTION INTRAVENOUS at 10:37

## 2017-01-01 RX ADMIN — ISOSORBIDE DINITRATE 10 MG: 10 TABLET ORAL at 21:48

## 2017-01-01 RX ADMIN — HYDRALAZINE HYDROCHLORIDE 20 MG: 10 TABLET ORAL at 05:53

## 2017-01-01 RX ADMIN — FUROSEMIDE 40 MG: 10 INJECTION, SOLUTION INTRAVENOUS at 08:46

## 2017-01-01 RX ADMIN — WARFARIN SODIUM 7.5 MG: 7.5 TABLET ORAL at 17:50

## 2017-01-01 RX ADMIN — HYDRALAZINE HYDROCHLORIDE 10 MG: 10 TABLET ORAL at 05:56

## 2017-01-01 RX ADMIN — SULFUR HEXAFLUORIDE 5 ML: KIT at 12:46

## 2017-01-01 RX ADMIN — HYDRALAZINE HYDROCHLORIDE 20 MG: 10 TABLET ORAL at 18:03

## 2017-01-01 RX ADMIN — HYDRALAZINE HYDROCHLORIDE 20 MG: 10 TABLET ORAL at 11:00

## 2017-01-01 RX ADMIN — CLOPIDOGREL 75 MG: 75 TABLET, FILM COATED ORAL at 09:20

## 2017-01-01 RX ADMIN — INSULIN ASPART 1 UNITS: 100 INJECTION, SOLUTION INTRAVENOUS; SUBCUTANEOUS at 08:58

## 2017-01-01 RX ADMIN — HYDRALAZINE HYDROCHLORIDE 20 MG: 10 TABLET ORAL at 17:14

## 2017-01-01 RX ADMIN — INSULIN ASPART 2 UNITS: 100 INJECTION, SOLUTION INTRAVENOUS; SUBCUTANEOUS at 17:56

## 2017-01-01 RX ADMIN — INSULIN ASPART 3 UNITS: 100 INJECTION, SOLUTION INTRAVENOUS; SUBCUTANEOUS at 17:25

## 2017-01-01 ASSESSMENT — ACTIVITIES OF DAILY LIVING (ADL)
SWALLOWING: 0-->SWALLOWS FOODS/LIQUIDS WITHOUT DIFFICULTY
DRESS: 0 - INDEPENDENT
COMMUNICATION: 0 - UNDERSTANDS/COMMUNICATES WITHOUT DIFFICULTY
BATHING: 0-->INDEPENDENT
COGNITION: 0 - NO COGNITION ISSUES REPORTED
CHANGE_IN_FUNCTIONAL_STATUS_SINCE_ONSET_OF_CURRENT_ILLNESS/INJURY: NO
SWALLOWING: 0 - SWALLOWS FOODS/LIQUIDS WITHOUT DIFFICULTY
BATHING: 0 - INDEPENDENT
TRANSFERRING: 0-->INDEPENDENT
EATING: 0 - INDEPENDENT
RETIRED_COMMUNICATION: 0-->UNDERSTANDS/COMMUNICATES WITHOUT DIFFICULTY
TOILETING: 0-->INDEPENDENT
AMBULATION: 0-->INDEPENDENT
PREVIOUS_RESPONSIBILITIES: MEAL PREP;LAUNDRY;SHOPPING;MEDICATION MANAGEMENT;FINANCES;DRIVING
RETIRED_EATING: 0-->INDEPENDENT
DRESS: 0-->INDEPENDENT
TRANSFERRING: 0 - INDEPENDENT
AMBULATION: 0 - INDEPENDENT
FALL_HISTORY_WITHIN_LAST_SIX_MONTHS: NO
TOILETING: 0 - INDEPENDENT

## 2017-01-01 ASSESSMENT — ENCOUNTER SYMPTOMS
BACK PAIN: 1
FEVER: 1
NAUSEA: 1
APPETITE CHANGE: 1
DIARRHEA: 1
VOMITING: 0
SHORTNESS OF BREATH: 1
FATIGUE: 1
VOMITING: 0
COUGH: 1
NAUSEA: 0
ABDOMINAL PAIN: 0
SHORTNESS OF BREATH: 0
CONSTIPATION: 1
CHILLS: 1

## 2017-01-01 ASSESSMENT — PAIN DESCRIPTION - DESCRIPTORS: DESCRIPTORS: CRAMPING;SHARP

## 2017-11-24 PROBLEM — I21.4 NSTEMI (NON-ST ELEVATED MYOCARDIAL INFARCTION) (H): Status: ACTIVE | Noted: 2017-01-01

## 2017-11-24 NOTE — IP AVS SNAPSHOT
"` `     [facility]: [id number]                                              INTERAGENCY TRANSFER FORM - NURSING   2017                    Hospital Admission Date: 2017  LEON RAMIREZ   : 1928  Sex: Male        Attending Provider: Guille Gasca MD     Allergies:  Lisinopril, No Clinical Screening - See Comments, Aspirin    Infection:  None   Service:  HOSPITALIST    Ht:  1.727 m (5' 8\")   Wt:  79 kg (174 lb 3.2 oz)   Admission Wt:  78 kg (172 lb)    BMI:  26.49 kg/m 2   BSA:  1.95 m 2            Patient PCP Information     Provider PCP Type    Jona Ortiz MD General      Current Code Status     Date Active Code Status Order ID Comments User Context       Prior      Code Status History     Date Active Date Inactive Code Status Order ID Comments User Context    2017  9:28 AM  DNR/DNI [id number]  Jocelyne Acosta MD Outpatient    2017 10:20 AM 2017  9:28 AM DNR/DNI [id number]  Jocelyne Acosta MD Inpatient    2017  2:41 PM 2017 10:20 AM Full Code [id number]  Guille Gasca MD Inpatient      Advance Directives        Does patient have a scanned Advance Directive/ACP document in EPIC?           No        Hospital Problems as of 2017              Priority Class Noted POA    NSTEMI (non-ST elevated myocardial infarction) (H) Medium  2017 Unknown      Non-Hospital Problems as of 2017              Priority Class Noted    Nonproliferative diabetic retinopathy (H) Medium  2009    Pseudophakia Medium  2009    Other states following surgery of eye and adnexa Medium  2009    Vitamin D deficiency Medium  2010    Necrobiosis lipoidica diabeticorum (H) Medium  2011    Peripheral autonomic neuropathy due to diabetes mellitus (H) Medium  3/30/2012    Nocturnal leg cramps Medium  2012    DVT (deep venous thrombosis) (H) Medium  2013    Anticoagulation goal of INR 2 to 3 " Medium  7/24/2013    Chronic left hip pain Medium  10/10/2014    History of CVA (cerebrovascular accident) Medium  10/10/2014    Impingement syndrome of right shoulder Medium  10/10/2014    H/O laser photocoagulation of retina Medium  11/30/2015    Type 1 diabetes mellitus with diabetic polyneuropathy (H) Medium  12/9/2015    Anemia Medium  12/21/2015    Atrial fibrillation (H) Medium  11/8/2016      Immunizations     None         END      ASSESSMENT     Discharge Profile Flowsheet     EXPECTED DISCHARGE     Abdominal Appearance  rounded 12/04/17 0951    Expected Discharge Date  12/04/17 ([facility] bed [facility]) 12/03/17 1643   Last Bowel Movement  12/01/17 12/02/17 0956    DISCHARGE NEEDS ASSESSMENT     GI Signs/Symptoms  belching 12/04/17 0951    Readmission Within The Last 30 Days  no previous admission in last 30 days 11/24/17 1541   Passing flatus  yes 12/03/17 1107    Anticipated Changes Related to Illness  none 11/24/17 1541   COMMUNICATION ASSESSMENT      Equipment Currently Used at Home  -- (Has shower chair ) 11/30/17 1536   Patient's communication style  spoken language (English or Bilingual) 11/24/17 1041    Transportation Available  car 11/30/17 1536   SKIN      # of Referrals Placed by CTS  Post Acute Facilities 12/01/17 1651   Inspection of bony prominences  Full except (identify areas not inspected) 12/04/17 0951    FUNCTIONAL LEVEL CURRENT     Skin WDL  ex 12/04/17 0951    Ambulation  1 - assistive equipment 12/01/17 1002   Full except areas not inspected   Buttock, left;Buttock, right;Sacrum;Coccyx 12/04/17 0951    Transferring  1 - assistive equipment 12/01/17 1002   Inspection under devices  Full 12/03/17 1651    Toileting  0 - independent 12/01/17 1002   Skin Color/Characteristics  bruised (ecchymotic) 12/04/17 0951    Bathing  0 - independent 12/01/17 1002   Skin Temperature  warm 12/04/17 0951    Dressing  0 - independent 12/01/17 1002   Skin Moisture  dry 12/04/17 0951     "Eating  0 - independent 12/01/17 1002   Skin Elasticity  slow return to original state 12/04/17 0951    Communication  0 - understands/communicates without difficulty 12/01/17 1002   Skin Integrity  bruise(s);scab(s);scar(s) 12/04/17 0951    Swallowing  2 - difficulty swallowing liquids/foods 12/01/17 1002   SAFETY      Change in Functional Status Since Onset of Current Illness/Injury  no 11/24/17 1539   Safety WDL  WDL 12/04/17 0951    GASTROINTESTINAL (ADULT,PEDIATRIC,OB)     All Alarms  alarm(s) activated and audible 12/04/17 0951    GI WDL  ex 12/04/17 0951                      Assessment WDL (Within Defined Limits) Definitions           Safety WDL     Effective: 09/28/15    Row Information: <b>WDL Definition:</b> Bed in low position, wheels locked; call light in reach; upper side rails up x 2; ID band on<br> <font color=\"gray\"><i>Item=AS safety wdl>>List=AS safety wdl>>Version=F14</i></font>      Skin WDL     Effective: 09/28/15    Row Information: <b>WDL Definition:</b> Warm; dry; intact; elastic; without discoloration; pressure points without redness<br> <font color=\"gray\"><i>Item=AS skin wdl>>List=AS skin wdl>>Version=F14</i></font>      Vitals     Vital Signs Flowsheet     COMMENTS     BSA (Calculated - sq m)  1.93 11/24/17 1043    Comments  OT: After activity 12/04/17 1206   BMI (Calculated)  26.21 11/24/17 1043    VITAL SIGNS     EKG MONITORING      Temp  97.3  F (36.3  C) 12/04/17 1152   Cardiac Regularity  Regular 11/24/17 1102    Temp src  Oral 12/04/17 1152   BOY COMA SCALE      Resp  18 12/04/17 1152   Best Eye Response  4-->(E4) spontaneous 12/04/17 0951    Pulse  87 12/03/17 0054   Best Motor Response  6-->(M6) obeys commands 12/04/17 0951    Heart Rate  85 12/04/17 1152   Best Verbal Response  5-->(V5) oriented 12/04/17 0951    Pulse/Heart Rate Source  Monitor 12/04/17 1152   [place] Coma Scale Score  15 12/04/17 0951    BP  112/69 12/04/17 1206   POSITIONING      BP Location  Left arm " "12/04/17 1206   Body Position  independently positioning;side-lying, right 12/04/17 0047    OXYGEN THERAPY     Head of Bed (HOB)  HOB at 20-30 degrees 12/04/17 0047    SpO2  94 % 12/04/17 1152   Positioning/Transfer Devices  pillows;in use 12/04/17 0047    O2 Device  None (Room air) 12/04/17 1152   Chair  Upright in chair 12/01/17 1843    Oxygen Delivery  1.5 LPM 12/03/17 1559   DAILY CARE      PAIN/COMFORT     Activity Management  standing at bedside 12/04/17 0235    Patient Currently in Pain  denies 12/04/17 0047   Activity Assistance Provided  assistance, 1 person 12/04/17 0235    Patient's Stated Pain Goal  No pain 11/24/17 2247   Assistive Device Utilized  walker 12/02/17 0340    0-10 Pain Scale  0 11/26/17 0002   ECG      HEIGHT AND WEIGHT     ECG Rhythm  Atrial fibrillation (CVR) 12/01/17 0926    Height  1.727 m (5' 8\") 11/24/17 1043   Equipment  electrodes changed;telemetry batteries changed 11/30/17 1132    Height Method  Stated 11/24/17 1043   POINT OF CARE TESTING      Weight  79 kg (174 lb 3.2 oz) 12/04/17 0510   Puncture Site  fingertip 11/28/17 1743    Weight Method  Standing scale 12/04/17 0510   Bedside Glucose (mg/dl )   192 mg/dl 11/28/17 1743            Patient Lines/Drains/Airways Status    Active LINES/DRAINS/AIRWAYS     Name: Placement date: Placement time: Site: Days: Last dressing change:    Peripheral IV 12/04/17 Left 12/04/17   0342      less than 1             Patient Lines/Drains/Airways Status    Active PICC/CVC     None            Intake/Output Detail Report     Date Intake     Output Net    Shift P.O. I.V. IV Piggyback Total Urine Total       Noc 12/02/17 2300 - 12/03/17 0659 -- -- -- -- 300 300 -300    Day 12/03/17 0700 - 12/03/17 1459 320 -- -- 320 175 175 145    Jacey 12/03/17 1500 - 12/03/17 2259 180 -- -- 180 175 175 5    Noc 12/03/17 2300 - 12/04/17 0659 240 -- -- 240 250 250 -10    Day 12/04/17 0700 - 12/04/17 1459 0 -- -- -- 200 200 -200      Last Void/BM       Most Recent " Value    Urine Occurrence 1 at 12/03/2017 2200    Stool Occurrence 1 at 11/30/2017 1400      Case Management/Discharge Planning     Case Management/Discharge Planning Flowsheet     REFERRAL INFORMATION     EMPLOYMENT      Did the Initial Social Work Assessment result in a Social Work Case?  Yes 12/01/17 1651   Do you work full or part-time?  no 12/01/17 1651    Admission Type  inpatient 12/01/17 1651   COPING/STRESS      Arrived From  home or self-care 12/01/17 1651   Major Change/Loss/Stressor  none 11/24/17 1549    Referral Source  physician 12/01/17 1651   EXPECTED DISCHARGE      # of Referrals Placed by CTS  Post Acute Facilities 12/01/17 1651   Expected Discharge Date  12/04/17 ([facility] Pending bed placement) 12/03/17 1643    Post Acute Facilities  TCU 12/01/17 1651   DISCHARGE PLANNING      Reason For Consult  discharge planning 12/01/17 1651   Readmission Within The Last 30 Days  no previous admission in last 30 days 11/24/17 1541    Record Reviewed  medical record 12/01/17 1651   Anticipated Changes Related to Illness  none 11/24/17 1541    CTS Assigned to Case  Margo Boswell 12/01/17 1651   Transportation Available  car 11/30/17 1536    LIVING ENVIRONMENT     Discharge Plan Concerns  no concerns 11/24/17 1541    Lives With  spouse 12/01/17 1651   FINAL RESOURCES      Living Arrangements  assisted living ([facility]) 12/01/17 1651   Equipment Currently Used at Home  -- (Has shower chair ) 11/30/17 1536    Provides Primary Care For  no one 12/01/17 1651   ABUSE RISK SCREEN      Quality Of Family Relationships  supportive;involved 12/01/17 1651   QUESTION TO PATIENT:  Has a member of your family or a partner(now or in the past) intimidated, hurt, manipulated, or controlled you in any way?  no 11/24/17 1539    ASSESSMENT OF FAMILY/SOCIAL SUPPORT     QUESTION TO PATIENT: Do you feel safe going back to the place where you are living?  yes 11/24/17 1539    Marital Status   12/01/17 1651    OBSERVATION: Is there reason to believe there has been maltreatment of a vulnerable adult (ie. Physical/Sexual/Emotional abuse, self neglect, lack of adequate food, shelter, medical care, or financial exploitation)?  no 11/24/17 1539    Who is your support system?  Children;Wife 12/01/17 1651   (R) MENTAL HEALTH SUICIDE RISK      Description of Support System  Supportive;Involved 12/01/17 1651   Are you depressed or being treated for depression?  No 11/24/17 1540    Support Assessment  Adequate family and caregiver support 12/01/17 1651   HOMICIDE RISK      Quality of Family Relationships  supportive;involved 12/01/17 1651   Feels Like Hurting Others  no 11/24/17 1045

## 2017-11-24 NOTE — ED NOTES
BP 85/56 in L arm and 88/61 in the R arm. MD notified. No distress. Pt eating and reports feeling some chest fullness. Reports some shallow breathing.

## 2017-11-24 NOTE — IP AVS SNAPSHOT
` ` Patient Information     Patient Name Sex     Dennys Ward ([id number]) Male 1928       Room Bed    258      Patient Demographics     Address Phone    [address]  [place] [address] [phone] (Home)  [phone] (Mobile)      Patient Ethnicity & Race     Ethnic Group Patient Race    American White      Emergency Contact(s)     Name Relation Home Work Mobile    nando ward [phone]      Brenda   [phone]        Documents on File        Status Date Received Description       Documents for the Patient    Affiliate Privacy placeholder   phase3    Consent for EHR Access Received 17     Insurance Card Received 17     External Medication Information Consent       Patient ID Received 17     [facility] Specified Other       Consent for Services/Privacy Notice - Hospital/Clinic Received 17     Privacy Notice - Jeannette Received 17     Care Everywhere Prospective Auth Received 17        Documents for the Encounter    CMS IM for Patient Signature Received 17   1MM      Admission Information     Attending Provider Admitting Provider Admission Type Admission Date/Time    Guille Gasca MD Rauniyar, Amit Kumar, MD Emergency 17  1034    Discharge Date Hospital [facility]    [facility] Room/Bed Admission Status        CARDIAC SPEC CARE  Admission (Confirmed)       Admission     Complaint    NSTEMI (non-ST elevated myocardial infarction) (H)      Hospital Account     Name Acct ID Class Status Primary Coverage    EdDennys NITESH [id number] Inpatient Open MEDICARE - MEDICARE FOR HB SUPPLEMENT            Guarantor Account (for Hospital Account #[id number])     Name Relation to Pt Service Area Active? Acct Type    Dennys Ward Self FCS Yes Personal/Family    Address Phone          [address]  [place]  [place] [address] [phone](H)              Coverage Information (for Hospital Account #[id number])     1. MEDICARE/MEDICARE FOR HB SUPPLEMENT     F/O Payor/Plan Precert #    MEDICARE/MEDICARE FOR HB SUPPLEMENT     Subscriber Subscriber #    Dennys Ward [id number]    Address Phone    [employer] CLAIMS  [address]  [place] [address] [phone]          2. MEDICA/MEDICA PRIME SOLUTION     F/O Payor/Plan Precert #    MEDICA/MEDICA PRIME SOLUTION     Subscriber Subscriber #    Dennys Ward [id number]    Address Phone    [address] [address]  [place] [address] [phone]

## 2017-11-24 NOTE — IP AVS SNAPSHOT
[facility]    [address] [place] [address]., [address]    [place] [address]    Phone:  [phone]                                       After Visit Summary   11/24/2017    Dennys Ward    MRN: [id number]           After Visit Summary Signature Page     I have received my discharge instructions, and my questions have been answered. I have discussed any challenges I see with this plan with the nurse or doctor.    ..........................................................................................................................................  Patient/Patient Representative Signature      ..........................................................................................................................................  Patient Representative Print Name and Relationship to Patient    ..................................................               ................................................  Date                                            Time    ..........................................................................................................................................  Reviewed by Signature/Title    ...................................................              ..............................................  Date                                                            Time

## 2017-11-24 NOTE — IP AVS SNAPSHOT
` `     [facility] CARDIAC SPECIALTY CARE: [phone]                 INTERAGENCY TRANSFER FORM - NOTES (H&P, Discharge Summary, Consults, Procedures, Therapies)   2017                    Hospital Admission Date: 2017  LEON RAMIREZ   : 1928  Sex: Male        Patient PCP Information     Provider PCP Type    Jona Ortiz MD General      History & Physicals     No notes of this type exist for this encounter.      Discharge Summaries     No notes of this type exist for this encounter.         Consult Notes      Consults signed by RAJAT Palomo MD at 2017  3:19 PM      Author:  RAJAT Palomo MD Service:  Nephrology Author Type:  Physician    Filed:  2017  3:19 PM Date of Service:  2017 10:13 AM Creation Time:  2017 10:46 AM    Status:  Signed :  RAJAT Palomo MD (Physician)         IDENTIFICATION:  Leon Ramirez is an 89-year-old male admitted through the emergency room on 2017 in the setting of increasing shortness of breath, dyspnea on exertion, chest pain and generalized malaise.      REASON FOR CONSULTATION:  Evaluation and assessment with respect to apparent acute on chronic renal insufficiency.      REQUESTING PHYSICIAN:  Murray Son MD, Hospitalist Service      IMPRESSION:   1.  Chronic kidney disease, stage III, with a baseline creatinine in the range of approximately 1.5 mg/dL.  Estimated GFR roughly 40 mL per minute.  Potential underlying etiologies include longstanding hypertension and diabetes.   2.  Intermittent dipstick-positive albuminuria.   3.  Acute on chronic renal insufficiency with presenting creatinine 2.24 mg per deciliter in the setting of concern for congestive heart failure.  His hospital course has been marked by diuresis in the setting of diarrhea and poor p.o. intake likely leading to at least initial prerenal state.  His creatinine today is 2.55.   4.  Borderline hyperkalemia on admission, now  corrected.   5.  Modest decreased serum bicarbonate, concern for metabolic acidosis.   6.  Non-ST elevation myocardial infarction.   7.  Apparent cardiomyopathy with an ejection fraction less than 20%, presumed in part ischemic.   8.  Diabetes with neuropathy.   9.  Hypertension.  Outpatient medications include an ARB and diuretic.   10.  History of thromboembolic disease.      DISCUSSION:  Elderly male with a number of comorbid medical problems who has advanced chronic kidney disease presumably on the basis of a combination of hypertension and diabetes.  He now has developed acute on chronic renal insufficiency which is likely at least initially prerenal and now may represent ATN.  He has been receiving diuresis since admission.  In this setting, he has had poor p.o. intake, as well as ongoing diarrhea, the latter of which may be in part explaining his metabolic acidosis.      Renal ultrasound was nondiagnostic and showed bilateral symmetric kidneys of reasonable size.  His urinalysis shows some degree of proteinuria.  His creatinine has increased today from 2.09-2.55.  His diuretic should be held.  Further workup undertaken includes urine sodium and repeat urine albumin to creatinine ratio, as well as evaluation for mineral bone disease.  Gentle hydration with saline seems appropriate.  He is at high risk for IV contrast-induced acute renal failure and I would not recommend contrast exposure at this time.      PLAN:   1.  Discontinue diuretics.   2.  Document urine sodium.   3.  Quantification of urine albumin.   4.  PTH, vitamin D, calcium and phosphorus levels.   5.  Normal saline hydration.   6.  Continue to hold ARB.   7.  We will follow with you.      HISTORY OF PRESENT ILLNESS:  The patient primarily receives care through the [facility] system.  He does not have an outpatient nephrologist by his report.  He does have some labs that were reviewed in the Care Everywhere segment of the medical record.  His baseline  creatinine is typically in the range of 1.3-1.5 mg/dL.  Estimated GFR on that basis is 40 mL per minute.  He has multiple UAs which do show some degree of albuminuria.      He presented to the emergency room on the 24th with shortness of breath.  Workup at that time in the emergency room showed him to be with a blood pressure of less than 100, in atrial fibrillation.  His initial sats were 99%.  His chest x-ray showed bibasilar pleural effusions.  No mention of significant pulmonary congestion.      He has been diuresed subsequent to admission with a creatinine on presentation of 2.24 which decreased to 2.22, but subsequently up to 2.33, back down again yesterday to 2.09 and today 2.55.      His electrolytes are otherwise not problematic.  He describes some degree of poor intake and abnormal or difficult gag reflex which limits his p.o. intake.  He believes his diarrhea is improving.  He is not having chest pain or shortness of breath and appears comfortable at present.      No other complaints or concerns.      PAST MEDICAL HISTORY:   1.  Diabetes.   2.  Hypertension.   3.  Chronic kidney disease.   4.  Neuropathy.   5.  Thromboembolic disease.   6.  Atrial fibrillation.   7.  History of anemia.   8.  Osteoarthritis.   9.  History of edema.      ALLERGIES:  Aspirin and lisinopril.      ADMISSION MEDICATIONS:  Insulin, acetaminophen, vitamin D, Plavix, furosemide, losartan, magnesium, warfarin.      SOCIAL HISTORY:  Remote alcohol and tobacco.  , lives with his wife.      FAMILY HISTORY:  No renal disease of note.      REVIEW OF SYSTEMS:  Complete 10-point review is undertaken, pertinent positives and negatives are as I noted above.      PHYSICAL EXAMINATION:   VITAL SIGNS:  He is afebrile, his rhythm is irregular, his rate is , his sats are 100% on room air.  I&O reviewed.  Decreasing urine output noted.   GENERAL:  No acute distress.  Appropriate and interactive.   HEENT:  Normocephalic, atraumatic.   Pupils equal, round, reactive to light and accommodation.  Extraocular muscles intact.  Sclerae are nonicteric, conjunctivae not injected.  External auditory canals and nares are visually patent.   NECK:  Supple.   CHEST:  Symmetric and clear with decreased base sounds at the bases, but crackles or wheezes are not appreciated.   CARDIOVASCULAR:  Irregularly irregular S1, S2.   ABDOMEN:  Soft and nontender.   EXTREMITIES:  Without cyanosis or clubbing.  He has trace lower extremity edema.   NEUROLOGIC:  Grossly nonfocal.  Cranial nerves intact.   PSYCHIATRIC:  Normal affect, pleasant mood.      LABORATORY DATA:  Current and historic reviewed in detail.         RAJAT ESPOSITO MD             D: 2017 10:13   T: 2017 10:46   MT: TS      Name:     LEON RAMIREZ   MRN:      [id number]        Account:       [id number]   :      1928           Consult Date:  2017      Document: [id number]    [GO1.1]   Revision History        User Key Date/Time User Provider Type Action    > GO1.1 2017  3:19 PM RAJAT Palomo MD Physician Sign            Consults by RAJAT Palomo MD at 2017 10:15 AM     Author:  RAJAT Palomo MD Service:  Nephrology Author Type:  Physician    Filed:  2017 10:15 AM Date of Service:  2017 10:15 AM Creation Time:  2017 10:15 AM    Status:  Signed :  RAJAT Palomo MD (Physician)     Consult Orders:    1. Nephrology IP Consult: Patient to be seen: Routine - within 24 hours; MORIAH; needs angiogram for ischemic work-up, assist with optimization of renal function; Consultant may enter orders: Yes [[id number]] ordered by Murray Son MD at 17 0827                Note dictated[GO1.1]     Revision History        User Key Date/Time User Provider Type Action    > GO1.1 2017 10:15 AM RAJAT Palomo MD Physician Sign                     Progress Notes - Physician (Notes from 17 through 17)      Progress Notes  by Margo Boswell LICSW at 12/4/2017  7:37 AM     Author:  Margo Boswell LICSW Service:  Social Work Author Type:      Filed:  12/4/2017  7:42 AM Date of Service:  12/4/2017  7:37 AM Creation Time:  12/4/2017  7:37 AM    Status:  Signed :  Margo Boswell LICSW ()         SW:  D:  Had a couple lengthy conversations with patient and family regarding discharge plans.  Patient has decided he would like to try tcu on discharge.  Patient states he is not sure how long he will be able to participate in therapy, but he feels as if he wants to try.  Patient states if he can no longer participate in therapy he will make the decision at that time if he will transition to hospice.  Patient is asking for transport to be arranged.  Explained that this will be private pay and patient and family are in agreement.  Call placed to [facility] to arrange for wheelchair transport at 13:00 today.  Will update [facility].  P:  Will continue to follow.[SJ1.1]     Revision History        User Key Date/Time User Provider Type Action    > SJ1.1 12/4/2017  7:42 AM Margo Boswell LICSW  Sign            Progress Notes by Jocelyne Acosta MD at 12/3/2017  1:13 PM     Author:  Jocelyne Acosta MD Service:  Hospitalist Author Type:  Physician    Filed:  12/3/2017  1:16 PM Date of Service:  12/3/2017  1:13 PM Creation Time:  12/3/2017  1:13 PM    Status:  Signed :  Jocelyne Acosta MD (Physician)         [facility]    Hospitalist Progress Note    Date of Service (when I saw the patient): 12/03/2017    Assessment & Plan   Dennys Ward is a 89 year old male with hx of DM1, chronic a-fib on chronic anticoagulation with warfarin, CKD, hx of DVT, and prior CVA who presented on 11/24/2017 with chest pain and dyspnea on exertion, and was admitted for NSTEMI and cardiogenic shock with new diagnosis of CHF with EF <20%.    Advanced care  planning  Discussed goals of care at length with patient, his son Cj, and his daughter Brenda on 12/1. Discussed new diagnoses of NSTEMI and CHF, and limitation of management in light of his age and co-morbidities. Discussed code status and very low likelihood  meaningful recovery. Following this discussion, the patient's code status was changed to DNR/DNI. He is open to hospice, and would to explore this with the hospice SW with his son present and daughter phoned in.   - Plan for now is restorative short of intubation/CPR  - Will likely d/c with hospice tomorrow    NSTEMI  Presented with dyspnea on exertion and chest heaviness. EKG on arrival showed Q waves in the inferior leads with no previous EKG to compare. Initial troponin 3.593 and peaked at 7.231. TTE on arrival showed LVEF <20% with severe global hypokinesis and minor regional variation. In the ED, he was not initiated on a heparin gtt because he was anticoagulated with warfarin. Cardiology was consulted on admission and felt that risks of coronary angiogram outweighed benefit, and recommended medical management with plan to reassess candidacy an an outpatient if renal function were to improve and patient felt strongly about intervention.   - Continues on PTA Plavix (aspirin allergy)    Cardiogenic shock  Acute systolic CHF exacerbation, new diagnosis  Mild to moderate mitral regurgitation  TTE on arrival showed LVEF <20% with severe global hypokinesis and minor regional variation, no severe valvular pathology. He has no previous diagnosis of CHF. Prior TTE in Nov 2016 showed LVEF 55%. Associated hypotension, elevated lactic acid, and MORIAH also present on arrival. Ddx for cardiomyopathy include ischemia, stress cardiomyopathy, and viral myocarditis given recent URI sx.  - He has been started on Imdur 10 mg TID and hydralazine 20 mg TID  - Holding ACE/ARB, spironolactone, and Lasix due to MORIAH    Chronic atrial fibrillation  Auto rate-controlled.  Maintained on warfarin for CHADS-VASc score 8. Warfarin was initially held on admission for angiogram, but has since been resumed.  - Continues on warfarin per PharmD dosing, bridging with heparin gtt    Essential hypertension  [PTA: Lasix 20 mg po daily, losartan 100 mg daily]  - Holding Lasix and losartan due to borderline blood pressures and MORIAH    Oliguric MORIAH on CKD stage III, Improved  Creatinine up to 2.55 from baseline 1.2-1.6. Attributed to hypoperfusion from CHF/NSTEMI, losartan, and Lasix vs cardiorenal syndrome also possible. Renal US (11/25) normal.  - Cr has appeared to plateau in the 2.2-2.3 range    Hypersensitivity gag reflex  Dyspepsia   Ongoing issues during this hospitalization with heightened gag reflex causing episodes of dry heaves and vomiting. Reports this has been present mildly for 2 months, worsened over previous 1-2 weeks and seemingly associated with post-nasal driop. Oropharyngeal exam normal. Video swallow study (11/29) normal.  - He has been started on fluticasone nasal spray and ranitidine with improvement    Diarrhea, Improving  Suspect antibiotic-associated from pip-tazo administered on arrival for possible sepsis. 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 Blood sugars were initially labile on his PTA regimen, but stabilized with initiation of Lantus  - Blood sugars  in last 24 hours  - Decrease Lantus from 20 to 10 units daily  - Medium SSI available.    History of DVT  Maintained on warfarin which has been continued    FEN: mod CHO, 2 g sodium diet  DVT Prophylaxis: Heparin gtt and Warfarin  Code Status: DNR/DNI    Disposition: Expected discharge likely tomorrow with [facility] History   No events overnight. Denies dizziness/lightheadedness. Denies chest pain. Persistent dyspneic on exertion.  Very poor appetite. Patient and family leaning toward hospice, but need his wife to be in board. She is coming in later this afternoon. Met with son and daughter at bedside and informed that decision on hospice should be made by tomorrow  - Decrease Lantus from 20 to 10 units daily  - d/c carb ratio    -Data reviewed today: I reviewed all new labs and imaging results over the last 24 hours. I personally reviewed no images or EKG's today.    Physical Exam   Temp: 97.4  F (36.3  C) Temp src: Oral BP: 102/65 Pulse: 87 Heart Rate: 75 Resp: 18 SpO2: 95 % O2 Device: None (Room air)    Vitals:    12/01/17 0344 12/02/17 0212 12/03/17 0300   Weight: 78.5 kg (173 lb) 79.3 kg (174 lb 12.8 oz) 79.2 kg (174 lb 9.6 oz)     Vital Signs with Ranges  Temp:  [96.6  F (35.9  C)-98  F (36.7  C)] 97.4  F (36.3  C)  Pulse:  [74-87] 87  Heart Rate:  [73-85] 75  Resp:  [16-18] 18  BP: (102-114)/(65-79) 102/65  SpO2:  [95 %-97 %] 95 %  I/O last 3 completed shifts:  In: 240 [P.O.:240]  Out: 700 [Urine:700]    Constitutional: Appears comfortable, NAD  Respiratory: Breathing non-labored. Diminished breath sounds bilaterally  Cardiovascular: Irregular rhythm, normal rate. 1+ BLE edema  GI: +BS, abd soft/NT  Skin/Integumen: No rash  Neuro: Alert and appropriate, ALTMAN  Psych: Calm and cooperative    Medications     Warfarin Therapy Reminder       IV infusion builder WITH additives 50 mL/hr at 11/29/17 1027     - MEDICATION INSTRUCTIONS -         warfarin  1 mg Oral ONCE at 18:00     [START ON 12/4/2017] insulin glargine  10 Units Subcutaneous Q24H     ranitidine  150 mg Oral Q24H     fluticasone  1 spray Both Nostrils Daily     isosorbide dinitrate  10 mg Oral TID     hydrALAZINE  20 mg Oral Q8H RUBEN     clopidogrel  75 mg Oral Daily     insulin aspart  1-7 Units Subcutaneous TID AC     insulin aspart  1-5 Units Subcutaneous At Bedtime     Data     Recent Labs  Lab 12/03/17  0534 12/02/17  0605 12/01/17  0544   INR 3.10* 2.69* 2.02*   * 130*  129*   POTASSIUM 4.8 4.6 4.3   CHLORIDE 95 95 97   CO2 24 24 23   BUN 74* 77* 78*   CR 2.18* 2.30* 2.27*   ANIONGAP 9 11 9   BERE 8.9 8.6 8.8   GLC 70 75 85       No results found for this or any previous visit (from the past 24 hour(s)).[MW1.1]         Revision History        User Key Date/Time User Provider Type Action    > MW1.1 12/3/2017  1:16 PM Jocelyne Acosta MD Physician Sign            Progress Notes by RAJAT Palomo MD at 12/2/2017 12:24 PM     Author:  RAJAT Palomo MD Service:  Nephrology Author Type:  Physician    Filed:  12/2/2017 12:26 PM Date of Service:  12/2/2017 12:24 PM Creation Time:  12/2/2017 12:24 PM    Status:  Signed :  RAJAT Palomo MD (Physician)         Renal Medicine Chart Check      Labs stable  Disposition pending    Holding diuretic and ARB    Will see again 12/04/17[GO1.1]        Recent Labs  Lab 12/02/17  0605   *   POTASSIUM 4.6   CHLORIDE 95   CO2 24   ANIONGAP 11   GLC 75   BUN 77*   CR 2.30*   GFRESTIMATED 27*   GFRESTBLACK 33*   BERE 8.6     Recent Labs   Lab Test  12/02/17   0605  12/01/17   0544  11/30/17   0615  11/29/17   0550  11/28/17   0615  11/27/17   0600  11/26/17   0512  11/25/17   0545  11/24/17   1054   CR  2.30*  2.27*  2.28*  2.44*  2.55*  2.09*  2.33*  2.20*  2.24*[GO1.2]           BRIDGER Palomo    [facility]  [phone][GO1.1]         Revision History        User Key Date/Time User Provider Type Action    > GO1.2 12/2/2017 12:26 PM RAJAT Palomo MD Physician Sign     GO1.1 12/2/2017 12:24 PM RAJAT Palomo MD Physician             Progress Notes by Jocelyne Acosta MD at 12/2/2017 10:58 AM     Author:  Jocelyne Acosta MD Service:  Hospitalist Author Type:  Physician    Filed:  12/2/2017 11:01 AM Date of Service:  12/2/2017 10:58 AM Creation Time:  12/2/2017 10:58 AM    Status:  Signed :  Jocelyne Acosta MD (Physician)         [facility]    Hospitalist Progress  Note    Date of Service (when I saw the patient): 12/02/2017    Assessment & Plan   Dennys Ward is a 89 year old male with hx of DM1, chronic a-fib on chronic anticoagulation with warfarin, CKD, hx of DVT, and prior CVA who presented on 11/24/2017 with chest pain and dyspnea on exertion, and was admitted for NSTEMI and cardiogenic shock with new diagnosis of CHF with EF <20%.    Advanced care planning  Discussed goals of care at length with patient, his son Cj, and his daughter Brenda on 12/1. Discussed new diagnoses of NSTEMI and CHF, and limitation of management in light of his age and co-morbidities. Discussed code status and very low likelihood  meaningful recovery. Following this discussion, the patient's code status was changed to DNR/DNI. He is open to hospice, and would to explore this with the hospice SW with his son present and daughter phoned in.   - Patient still thinking about hospice. Plan for now is restorative short of intubation/CPR    NSTEMI  Presented with dyspnea on exertion and chest heaviness. EKG on arrival showed Q waves in the inferior leads with no previous EKG to compare. Initial troponin 3.593 and peaked at 7.231. TTE on arrival showed LVEF <20% with severe global hypokinesis and minor regional variation. In the ED, he was not initiated on a heparin gtt because he was anticoagulated with warfarin. Cardiology was consulted on admission and felt that risks of coronary angiogram outweighed benefit, and recommended medical management with plan to reassess candidacy an an outpatient if renal function were to improve and patient felt strongly about intervention.   - Continues on PTA Plavix (aspirin allergy)    Cardiogenic shock  Acute systolic CHF exacerbation, new diagnosis  Mild to moderate mitral regurgitation  TTE on arrival showed LVEF <20% with severe global hypokinesis and minor regional variation, no severe valvular pathology. He has no previous diagnosis of CHF. Prior TTE  in Nov 2016 showed LVEF 55%. Associated hypotension, elevated lactic acid, and MORIAH also present on arrival. Ddx for cardiomyopathy include ischemia, stress cardiomyopathy, and viral myocarditis given recent URI sx.  - He has been started on Imdur 10 mg TID and hydralazine 20 mg TID  - Holding ACE/ARB, spironolactone, and Lasix due to MORIAH    Chronic atrial fibrillation  Auto rate-controlled. Maintained on warfarin for CHADS-VASc score 8. Warfarin was initially held on admission for angiogram, but has since been resumed.  - Continues on warfarin per PharmD dosing, bridging with heparin gtt    Essential hypertension  [PTA: Lasix 20 mg po daily, losartan 100 mg daily]  - Holding Lasix and losartan due to borderline blood pressures and MORIAH    Oliguric MORIAH on CKD stage III, Improved  Creatinine up to 2.55 from baseline 1.2-1.6. Attributed to hypoperfusion from CHF/NSTEMI, losartan, and Lasix vs cardiorenal syndrome also possible. Renal US (11/25) normal.  - Cr has appeared to plateau in the 2.2-2.3 range    Hypersensitivity gag reflex  Dyspepsia   Ongoing issues during this hospitalization with heightened gag reflex causing episodes of dry heaves and vomiting. Reports this has been present mildly for 2 months, worsened over previous 1-2 weeks and seemingly associated with post-nasal driop. Oropharyngeal exam normal. Video swallow study (11/29) normal.  - He has been started on fluticasone nasal spray and ranitidine with improvement    Diarrhea, Improving  Suspect antibiotic-associated from pip-tazo administered on arrival for possible sepsis. 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 Blood sugars were initially labile on his PTA regimen, but stabilized with initiation of Lantus  - Blood sugars  in last 24 hours  - He has been started on Lantus 20 units daily, aspart 1u/carb unit  "TID AC  - Medium SSI available.    History of DVT  Maintained on warfarin which has been continued    FEN: mod CHO, 2 g sodium diet  DVT Prophylaxis: Heparin gtt and Warfarin  Code Status: DNR/DNI    Disposition: Expected discharge pending decision on hospice, Sun vs Mon    [facility]    Interval History   No events overnight. Feels \"thick in the head,\" but denies dizziness/lightheadedness. Denies chest pain. Continues to orthostatic and dyspneic on exertion. Awaiting arrival of his daughter from [place] before he decides on hospice.  - No change in care plan by me    -Data reviewed today: I reviewed all new labs and imaging results over the last 24 hours. I personally reviewed no images or EKG's today.    Physical Exam   Temp: 98  F (36.7  C) Temp src: Oral BP: (!) 85/54   Heart Rate: 73 Resp: 18 SpO2: 96 % O2 Device: None (Room air) Oxygen Delivery: 1 LPM  Vitals:    11/30/17 0125 12/01/17 0344 12/02/17 0212   Weight: 77.8 kg (171 lb 8 oz) 78.5 kg (173 lb) 79.3 kg (174 lb 12.8 oz)     Vital Signs with Ranges  Temp:  [97.3  F (36.3  C)-98  F (36.7  C)] 98  F (36.7  C)  Heart Rate:  [73-97] 73  Resp:  [16-18] 18  BP: ()/(42-75) 85/54  SpO2:  [94 %-96 %] 96 %  I/O last 3 completed shifts:  In: 850 [P.O.:850]  Out: 225 [Urine:225]    Constitutional: Appears comfortable, NAD  Respiratory: Breathing non-labored. Diminished breath sounds bilaterally  Cardiovascular: Irregular rhythm, normal rate. 1+ BLE edema  GI: +BS, abd soft/NT  Skin/Integumen: No rash  Neuro: Alert and appropriate, ALTMAN  Psych: Calm and cooperative    Medications     Warfarin Therapy Reminder       IV infusion builder WITH additives 50 mL/hr at 11/29/17 3337     - MEDICATION INSTRUCTIONS -[MW1.1]         ranitidine  150 mg Oral Q24H     fluticasone  1 spray Both Nostrils Daily     isosorbide dinitrate  10 mg Oral TID     insulin glargine  20 Units Subcutaneous Q24H     insulin aspart   Subcutaneous TID AC     hydrALAZINE  20 mg Oral " Q8H RUBEN     clopidogrel  75 mg Oral Daily     insulin aspart  1-7 Units Subcutaneous TID AC     insulin aspart  1-5 Units Subcutaneous At Bedtime[MW1.2]     Data     Recent Labs  Lab 12/02/17  0605 12/01/17  0544 11/30/17  0615  11/26/17  0512 11/25/17  2346 11/25/17  1822   WBC  --   --   --   --  10.5  --   --    HGB  --   --   --   --  11.6*  --   --    MCV  --   --   --   --  91  --   --    PLT  --   --   --   --  245  --   --    INR 2.69* 2.02* 1.86*  < > 3.69*  --   --    * 129* 132*  < > 136  --   --    POTASSIUM 4.6 4.3 4.2  < > 4.5  --   --    CHLORIDE 95 97 98  < > 101  --   --    CO2 24 23 21  < > 26  --   --    BUN 77* 78* 77*  < > 63*  --   --    CR 2.30* 2.27* 2.28*  < > 2.33*  --   --    ANIONGAP 11 9 13  < > 9  --   --    BERE 8.6 8.8 8.8  < > 8.6  --   --    GLC 75 85 61*  < > 63*  --   --    TROPI  --   --   --   --  4.419* 4.134* 4.665*   < > = values in this interval not displayed.    No results found for this or any previous visit (from the past 24 hour(s)).[MW1.1]         Revision History        User Key Date/Time User Provider Type Action    > MW1.2 12/2/2017 11:01 AM Jocelyne Acosta MD Physician Sign     MW1.1 12/2/2017 10:58 AM Jocelyne Acosta MD Physician             Progress Notes by Margo Boswell LICSW at 12/1/2017  4:51 PM     Author:  Margo Boswell LICSW Service:  Social Work Author Type:      Filed:  12/1/2017  5:02 PM Date of Service:  12/1/2017  4:51 PM Creation Time:  12/1/2017  4:51 PM    Status:  Signed :  Margo Boswell LICSW ()         Care Transition Initial Assessment -   Reason For Consult: discharge planning  Met with: Patient and Family  (son Cj)  Active Problems:    NSTEMI (non-ST elevated myocardial infarction) (H)         DATA  Lives With: spouse [facility]  Living Arrangements: assisted living ([facility])  Description of Support System: Supportive, Involved  Who is your support system?:  Children, Wife  Support Assessment: Adequate family and caregiver support.   Identified issues/concerns regarding health management:       Quality Of Family Relationships: supportive, involved  Transportation Available: car    Per social service protocol for discharge planning.  Patient was admitted on 11-24-17 with a n stemi.  The tentative date of discharge is yet to be determined.  Reviewed chart and spoke with patient and son Cj regarding discharge plans.  Per patient and son report, patient lives with his wife in an apartment at [facility].  Patient has a bath bench in the bathroom.  Patient states that his wife is unable to assist.  Spoke with patient's MD who has entered a hospice consult.  Patient and son have no preference as to hospice agency.  Referral to [facility].  Myrna can meet with patient and son around 14:00.  Answered questions about the private pay costs at [facility] for hospice care.  They require $3,000 up front in addition to the $36 per day amenity fee.  Patient was asking questions about how that would affect his wife as she still would have to pay rent.  Explained how medical assistance works and strongly encouraged them to contact an elder law  to discuss further.  Patient states he would like to discuss hospice further with his wife and his daughter.  Patient's son states that his sister is coming from [place].  Patient's son states Sunday would be the earliest before he makes any decisions.  Explained [facility].  Also discussed transport options.  Will follow up with patient and son over the weekend.  Tentatively set up a [facility] intake with patient on Monday at 15:30 so their schedule does not fill up.  Will cancel this is need be.  Updated [facility] as to this information.      ASSESSMENT  Cognitive Status:  awake and alert  Concerns to be addressed: discharge planning/hospice.      PLAN  Financial costs for the patient includes private pay hospice costs and transportation costs.  Patient given options and choices for discharge hospice versus tcu and where.  Patient/family is agreeable to the plan?  Yes  Patient Goals and Preferences: TBD.  Patient anticipates discharging to:  [facility].    Will continue to follow and assist with a safe discharge plan.[SJ1.1]    Margo Boswell[SJ1.2], MSW, KARLOS  [phone][SJ1.1]           Revision History        User Key Date/Time User Provider Type Action    > SJ1.2 12/1/2017  5:02 PM Margo Boswell LICSW  Sign     SJ1.1 12/1/2017  4:51 PM Margo Boswell LICSW              Progress Notes by Myrna Shepherd RN at 12/1/2017  3:23 PM     Author:  Myrna Shepherd RN Service:  Hospice Author Type:  Registered Nurse    Filed:  12/1/2017  3:38 PM Date of Service:  12/1/2017  3:23 PM Creation Time:  12/1/2017  3:23 PM    Status:  Signed :  Myrna Shepherd RN (Registered Nurse)         [facility]: Met with patient and son Cj to explain the hospice program. Pt has a diagnosis of acute systolic CHF, NSTEMI, afib, moderate mitral regurgitation, CKD 4 and hx of DVT. Pt is living at the [facility] in [place] with his spouse. With all the information the pt and son have been given today they are trying to make a decision. They want to discuss all with the rest of the family. Pts daughter will be coming from [place] tomorrow so they will have a chance to all convene and discuss the options.   I explained the hospice philosophy of care. The pt doesn't think therapy will be helpful at this point and is looking at a comfort approach at discharge at least as of today in this discussion.  I explained the medicare benefit, the services available for support, medication and equipment coverage. I explained how the hospice team will be an added extra layer of support and care for the patient at  the nursing home. Since the patient is already a resident of [facility] there is a bed available for the patient in the TCU area for hospice care. He is leaning towards this.   We did not sign any hospice consent forms at this visit.   If pt decides to be discharged on hospice services these are the medication recommendations:  1.Hydromorphone 10mg/ml, 1 mg or 0.1 ML PO/SL every 2 hours PRN pain/dyspnea  2.Atropine 1% 2 drops PO/SL every 2 hours PRN terminal congestion  3.Bisacodyl supp 10mg AK daily PRN constipation  4.Acetaminophen supp 650mg AK every 4 hours PRN fever/mild pain  5.Senna 8.6mg one tab po 2x day as needed for constipation.  No ranges and bubble packaging required for the facility.   A POLST will need to be completed if possible.   Left the pt and son with the hospice brochure and the hospice information sheet along with the 24 hr number for questions. Margo Boswell  is aware of this plan of care. Thank you for the referral. Myrna Shepherd RN, BSN  [facility] Admission nurse  [phone]   [HR1.1]     Revision History        User Key Date/Time User Provider Type Action    > HR1.1 12/1/2017  3:38 PM Myrna Shepherd RN Registered Nurse Sign            Progress Notes by RAJAT Palomo MD at 12/1/2017  2:49 PM     Author:  RAJAT Palomo MD Service:  Nephrology Author Type:  Physician    Filed:  12/1/2017  2:51 PM Date of Service:  12/1/2017  2:49 PM Creation Time:  12/1/2017  2:49 PM    Status:  Signed :  RAJAT Palomo MD (Physician)         Renal Medicine Progress Note                                Dennys Ward MRN# [id number]   Age: 89 year old YOB: 1928   Date of Admission: 11/24/2017 Hospital LOS: 7                  Assessment/Plan:     89-year-old male admitted through the emergency room on 11/24/2017 in the setting of   increasing SOB, FORRESTER, CP and generalized malaise.     Evaluatedt with respect to apparent acute on chronic renal  insufficiency      1.  CKD stage III   -creatinine  1.5 mg/dl range   -GFR 40 ml/min range  2.  No microalbuminuria by current albumin to creatinine ratio  3.  ARF; concern for pre renal v cardio renal    -oliguric   -Omar 9   -creatinine stabilized  4.  Secondary hyperparathyroidism   -vitamin D level pending  5.  Hyperkalemia   -normalized  6.  Cardiomyopathy      Continue to hold ARB and diuretic        Interval History:     Comfortable.  Labs stable.  Denies CP or worsening edema      ROS:     GENERAL: NAD, No fever,chills  R: NEGATIVE for significant cough or SOB  CV: NEGATIVE for chest pain, palpitations  EXT: no change edema  ROS otherwise negative    Medications and Allergies:     Reviewed    Physical Exam:     Vitals were reviewed  Patient Vitals for the past 8 hrs:   BP Temp Temp src Heart Rate Resp   12/01/17 1129 111/75 97.3  F (36.3  C) Oral 85 18   12/01/17 0748 94/62 97.4  F (36.3  C) Oral 87 18     I/O last 3 completed shifts:  In: 928 [P.O.:928]  Out: 725 [Urine:725]    Vitals:    11/27/17 0147 11/28/17 0530 11/29/17 0641 11/30/17 0125   Weight: 76.1 kg (167 lb 12.8 oz) 75.9 kg (167 lb 6 oz) 76.5 kg (168 lb 9.6 oz) 77.8 kg (171 lb 8 oz)    12/01/17 0344   Weight: 78.5 kg (173 lb)       GENERAL: awake, alert, follows  HEENT: NC/AT, PERRLA, EOMI, non icteric, pharynx moist without lesion  RESP:  clear anteriorly  CV: RRR, normal S1 S2  ABDOMEN: soft, nontender, no HSM or masses and bowel sounds normal  MS: no clubbing, cyanosis   SKIN: clear without significant rashes or lesions  NEURO: speech normal and cranial nerves 2-12 intact  PSYCH: affect normal/bright  EXT: trace edema    Data:       Recent Labs  Lab 12/01/17  0544 11/30/17  0615 11/29/17  0550 11/28/17  0615   * 132* 133 133   POTASSIUM 4.3 4.2 4.6 5.2   CHLORIDE 97 98 99 98   CO2 23 21 24 21   ANIONGAP 9 13 10 14   GLC 85 61* 102* 176*   BUN 78* 77* 75* 68*   CR 2.27* 2.28* 2.44* 2.55*   GFRESTIMATED 27* 27* 25* 24*   GFRESTBLACK 33*  "33* 30* 29*   BERE 8.8 8.8 8.7 9.1         Recent Labs   Lab Test  12/01/17   0544  11/30/17   0615  11/29/17   0550  11/28/17   0615  11/27/17   0600  11/26/17   0512  11/25/17   0545  11/24/17   1054   CR  2.27*  2.28*  2.44*  2.55*  2.09*  2.33*  2.20*  2.24*     No lab results found in last 7 days.    Recent Labs  Lab 11/29/17  0550 11/26/17  0512 11/25/17  0545   PHOS 4.6*  --   --    HGB  --  11.6* 10.5*       Recent Labs  Lab 11/29/17  0550   PTHI 153*       Recent Labs  Lab 11/28/17  1910   UNAR 9       Recent Labs  Lab 11/28/17 1910   UMALCR 18.79*         G Blanco Palomo    [facility] - Nephrology  [phone][GO1.1]     Revision History        User Key Date/Time User Provider Type Action    > GO1.1 12/1/2017  2:51 PM RAJAT Palomo MD Physician Sign            Progress Notes by Salima Mckeon at 12/1/2017  1:24 PM     Author:  Salima Mckeon Service:  Nutrition Author Type:  Registered Dietitian    Filed:  12/1/2017  1:31 PM Date of Service:  12/1/2017  1:24 PM Creation Time:  12/1/2017  1:24 PM    Status:  Signed :  Salima Mckeon (Registered Dietitian)         BRIEF NUTRITION ASSESSMENT      REASON FOR ASSESSMENT:  LOS    NUTRITION HISTORY:  Pt follows a regular diet at home with no allergies, intolerances or restrictions. States that his appetite and intake have been \"okay.\" He largely consumes softer foods due to trouble chewing and swallowing. Typically consumes 1/2 of his meals at baseline. Does not believe he has lost any weight and denies any major changes in intake prior to admission.     CURRENT DIET AND INTAKE:  Diet:  Mod CHO, 2g NA              50-75% intake of most meals; pt states this is his baseline. Upon interview he is eating soup, yogurt and fruit.     ANTHROPOMETRICS:  Height:[VH1.1] 5' 8\"[VH1.2]  Weight:[VH1.1] 173 lbs 0 oz[VH1.2](78.5 kg)  BMI: 26.30 kg/m2  IBW:  70 kg  Weight Status: Overweight BMI 25-29.9  %IBW: 112%  Weight History: Pt denies any changes in " weight recently. No history in [facility] system. Per review of Care Everywhere pt's weight was 78.9 kg ~ 6 weeks ago. Do not suspect any recent weight loss    LABS:  Labs noted    MALNUTRITION:  Patient does not meet two of the following criteria necessary for diagnosing malnutrition: significant weight loss, reduced intake, subcutaneous fat loss, muscle loss or fluid retention    NUTRITION INTERVENTION:  Nutrition Diagnosis:  No nutrition diagnosis at this time.    Implementation:  Nutrition Education:Per Provider order if indicated    FOLLOW UP/MONITORING:   Will re-evaluate in 7 - 10 days, or sooner, if re-consulted.    Salima Mckeon RD, LD  Clinical Dietitian[VH1.1]           Revision History        User Key Date/Time User Provider Type Action    > VH1.2 12/1/2017  1:31 PM Salima Mckeon Registered Dietitian Sign     VH1.1 12/1/2017  1:24 PM Salima Mckeon Registered Dietitian             Progress Notes by Jocelyne Acosta MD at 12/1/2017 10:55 AM     Author:  Jocelyne Acosta MD Service:  Hospitalist Author Type:  Physician    Filed:  12/1/2017 11:03 AM Date of Service:  12/1/2017 10:55 AM Creation Time:  12/1/2017 10:55 AM    Status:  Signed :  Jocelyne Acosta MD (Physician)         [facility]    Hospitalist Progress Note    Date of Service (when I saw the patient): 12/01/2017    Assessment & Plan   Dennys Ward is a 89 year old male with hx of DM1, chronic a-fib on chronic anticoagulation with warfarin, CKD, hx of DVT, and prior CVA who presented on 11/24/2017 with chest pain and dyspnea on exertion, and was admitted for NSTEMI and cardiogenic shock with new diagnosis of CHF with EF <20%.    Advanced care planning  Discussed goals of care at length with patient, his son Cj, and his daughter Brenda on 12/1. Discussed new diagnoses of NSTEMI and CHF, and limitation of management in light of his age and co-morbidities. Discussed code status and very low  likelihood  meaningful recovery. Following this discussion, the patient's code status was changed to DNR/DNI. He is open to hospice, and would to explore this with the hospice SW with his son present and daughter phoned in.   - Plan for now is restorative short of intubation/CPR, pending hospice meeting.   - Hospice SW consult placed    NSTEMI  Presented with dyspnea on exertion and chest heaviness. EKG on arrival showed Q waves in the inferior leads with no previous EKG to compare. Initial troponin 3.593 and peaked at 7.231. TTE on arrival showed LVEF <20% with severe global hypokinesis and minor regional variation. In the ED, he was not initiated on a heparin gtt because he was anticoagulated with warfarin. Cardiology was consulted on admission and felt that risks of coronary angiogram outweighed benefit, and recommended medical management with plan to reassess candidacy an an outpatient if renal function were to improve and patient felt strongly about intervention.   - Continues on PTA Plavix (aspirin allergy)    Cardiogenic shock  Acute systolic CHF exacerbation, new diagnosis  Mild to moderate mitral regurgitation  TTE on arrival showed LVEF <20% with severe global hypokinesis and minor regional variation, no severe valvular pathology. He has no previous diagnosis of CHF. Prior TTE in Nov 2016 showed LVEF 55%. Associated hypotension, elevated lactic acid, and MORIAH also present on arrival. Ddx for cardiomyopathy include ischemia, stress cardiomyopathy, and viral myocarditis given recent URI sx.  - He has been started on Imdur 10 mg TID and hydralazine 20 mg TID  - Holding ACE/ARB, spironolactone, and Lasix due to MORIAH    Chronic atrial fibrillation  Auto rate-controlled. Maintained on warfarin for CHADS-VASc score 8. Warfarin was initially held on admission for angiogram, but has since been resumed.  - Continues on warfarin per PharmD dosing, bridging with heparin gtt    Essential hypertension  [PTA: Lasix 20 mg  "po daily, losartan 100 mg daily]  - Holding Lasix and losartan due to borderline blood pressures and MORIAH    Oliguric MORIAH on CKD stage III, Improved  Creatinine up to 2.55 from baseline 1.2-1.6. Attributed to hypoperfusion from CHF/NSTEMI, losartan, and Lasix vs cardiorenal syndrome also possible. Renal US (11/25) normal.  - Cr has appeared to plateau in the 2.2-2.3 range    Hypersensitivity gag reflex  Dyspepsia   Ongoing issues during this hospitalization with heightened gag reflex causing episodes of dry heaves and vomiting. Reports this has been present mildly for 2 months, worsened over previous 1-2 weeks and seemingly associated with post-nasal driop. Oropharyngeal exam normal. Video swallow study (11/29) normal.  - He has been started on fluticasone nasal spray and ranitidine with improvement    Diarrhea, Improving  Suspect antibiotic-associated from pip-tazo administered on arrival for possible sepsis. 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 Blood sugars were initially labile on his PTA regimen, but stabilized with initiation of Lantus  - Blood sugars  in last 24 hours  - He has been started on Lantus 20 units daily, aspart 1u/carb unit TID AC  - Medium SSI available.    History of DVT  Maintained on warfarin which has been continued    FEN: mod CHO, 2 g sodium diet  DVT Prophylaxis: Heparin gtt and Warfarin  Code Status: DNR/DNI    Disposition: Expected discharge pending hospice meeting, TCU vs hospice facility possibly tomorrow    Jocelyne Acosta    Interval History   No events overnight. Feels somewhat \"flushed,\" but offers no other concerns. Continues to have dyspnea on exertion with +orthostatics. Discussed goals of care at length with patient, his son, and his daughter. Details noted above  - Hospice SW consult  - Code status changed to DNR/DNI  - " Continue restorative therapies for now, pending hospice meeting    -Data reviewed today: I reviewed all new labs and imaging results over the last 24 hours. I personally reviewed no images or EKG's today.    Physical Exam   Temp: 97.4  F (36.3  C) Temp src: Oral BP: 94/62 Pulse: 78 Heart Rate: 87 Resp: 18 SpO2: 96 % O2 Device: Nasal cannula Oxygen Delivery: 2 LPM  Vitals:    11/29/17 0641 11/30/17 0125 12/01/17 0344   Weight: 76.5 kg (168 lb 9.6 oz) 77.8 kg (171 lb 8 oz) 78.5 kg (173 lb)     Vital Signs with Ranges  Temp:  [96  F (35.6  C)-97.7  F (36.5  C)] 97.4  F (36.3  C)  Pulse:  [78] 78  Heart Rate:  [] 87  Resp:  [16-18] 18  BP: ()/(50-87) 94/62  SpO2:  [93 %-97 %] 96 %  I/O last 3 completed shifts:  In: 928 [P.O.:928]  Out: 725 [Urine:725]    Constitutional: Appears comfortable, NAD  Respiratory: Breathing non-labored. Decreased breath sounds over bases bilaterally  Cardiovascular: Irregular rhythm, normal rate. Trace pedal edema  GI: +BS, abd soft/NT  Skin/Integumen: No rash  Neuro: Alert and appropriate, ALTMAN  Psych: Calm and cooperative    Medications     Warfarin Therapy Reminder       HEParin 600 Units/hr (12/01/17 0720)     IV infusion builder WITH additives 50 mL/hr at 11/29/17 1449     - MEDICATION INSTRUCTIONS -[MW1.1]         ranitidine  150 mg Oral Q24H     fluticasone  1 spray Both Nostrils Daily     isosorbide dinitrate  10 mg Oral TID     insulin glargine  20 Units Subcutaneous Q24H     insulin aspart   Subcutaneous TID AC     hydrALAZINE  20 mg Oral Q8H RUBEN     clopidogrel  75 mg Oral Daily     insulin aspart  1-7 Units Subcutaneous TID AC     insulin aspart  1-5 Units Subcutaneous At Bedtime[MW1.2]     Data     Recent Labs  Lab 12/01/17  0544 11/30/17  0615 11/29/17  0550  11/26/17  0512 11/25/17  2346 11/25/17  1822  11/25/17  0545   WBC  --   --   --   --  10.5  --   --   --  9.0   HGB  --   --   --   --  11.6*  --   --   --  10.5*   MCV  --   --   --   --  91  --   --   --  91    PLT  --   --   --   --  245  --   --   --  239   INR 2.02* 1.86* 1.83*  < > 3.69*  --   --   --  6.67*   * 132* 133  < > 136  --   --   --  136   POTASSIUM 4.3 4.2 4.6  < > 4.5  --   --   --  5.1   CHLORIDE 97 98 99  < > 101  --   --   --  101   CO2 23 21 24  < > 26  --   --   --  26   BUN 78* 77* 75*  < > 63*  --   --   --  72*   CR 2.27* 2.28* 2.44*  < > 2.33*  --   --   --  2.20*   ANIONGAP 9 13 10  < > 9  --   --   --  9   BERE 8.8 8.8 8.7  < > 8.6  --   --   --  8.4*   GLC 85 61* 102*  < > 63*  --   --   --  79   TROPI  --   --   --   --  4.419* 4.134* 4.665*  < > 7.231*   < > = values in this interval not displayed.    No results found for this or any previous visit (from the past 24 hour(s)).[MW1.1]         Revision History        User Key Date/Time User Provider Type Action    > MW1.2 12/1/2017 11:03 AM Jocelyne Acosta MD Physician Sign     MW1.1 12/1/2017 10:55 AM Jocelyne Acosta MD Physician                   Procedure Notes     No notes of this type exist for this encounter.      Progress Notes - Therapies (Notes from 12/01/17 through 12/04/17)     No notes of this type exist for this encounter.

## 2017-11-24 NOTE — PHARMACY-ADMISSION MEDICATION HISTORY
"Admission Medication History    Admission medication history interview status for the 11/24/2017 admission is complete. See EPIC admission navigator for prior to admission medications     Medication history source reliability:Good    Actions taken by pharmacist (provider contacted, etc):None     Additional medication history information not noted on PTA med list :None    Medication reconciliation/reorder completed by provider prior to medication history? No    Time spent in this activity: 15 minutes    Prior to Admission medications    Medication Sig Last Dose Taking? Auth Provider   acetaminophen (PAIN & FEVER) 325 MG tablet Take 325 mg by mouth every 4 hours as needed  at PRN Yes Reported, Patient   insulin syringe-needle U-100 (BD INSULIN SYRINGE ULTRAFINE) 30G X 1/2\" 0.5 ML FOR ADMINISTERING INSULIN AT HOME (1 TIME FOR NPH & 3 TIMES FOR R INSULIN)  Yes Reported, Patient   Cholecalciferol (VITAMIN D3) 2000 UNITS CAPS Take 4,000 Units by mouth daily 11/24/2017 at Unknown time Yes Reported, Patient   clopidogrel (PLAVIX) 75 MG tablet Take 75 mg by mouth daily 11/24/2017 at AM Yes Reported, Patient   furosemide (LASIX) 20 MG tablet Take 20 mg by mouth daily 11/24/2017 at AM Yes Reported, Patient   insulin regular (HUMULIN R/NOVOLIN R) 100 UNIT/ML injection INJECT SUBCUTANEOUSLY 3 TIMES DAILY BEFORE MEALS PER SLIDING SCALE UP TO 94 UNITS PER DAY >>> For  to 160 inject 2 units      For  to 200 inject 3 units    For  to 240 inject 4 units     For  to 300 inject 5 units     For  or greater inject 6 units  Yes Reported, Patient   Insulin Syringe/Needle 28G X 1/2\" 1 ML MISC As directed. For administering insulin at home.  Yes Reported, Patient   losartan (COZAAR) 100 MG tablet Take 100 mg by mouth daily 11/24/2017 at AM Yes Reported, Patient   MAGNESIUM-ZINC PO Magnesium Zinc pill once daily at night 11/24/2017 at AM Yes Reported, Patient   warfarin (COUMADIN) 5 MG tablet Take by mouth " every evening -  For history of DVT  INR Goal 2-3     7.5 mg on Monday, Wednesday and Friday  5 mg on all other days 11/23/2017 at PM Yes Reported, Patient   blood glucose monitoring (ONETOUCH ULTRA) test strip TEST 4 TIMES DAILY  Yes Reported, Patient   Insulin NPH Human, Isophane, (HUMULIN N SC) Inject 15 Units Subcutaneous every morning (before breakfast) 11/24/2017 at AM Yes Unknown, Entered By History   insulin NPH (HUMULIN N/NOVOLIN N) 100 UNIT/ML injection Inject 13 Units Subcutaneous daily (with dinner) 11/23/2017 at PM Yes Unknown, Entered By History       Daniel Aquino, PharmD

## 2017-11-24 NOTE — IP AVS SNAPSHOT
[facility] CARDIAC SPECIALTY CARE: [phone]                                              INTERAGENCY TRANSFER FORM - PHYSICIAN ORDERS   11/24/2017                   CHF Orders/Instructions for Nursing Home/TCU     CHF Orders and Instructions for Nursing Home/TCU  1.  Have the patient get a scale to put in their room and then use at home.  2.  Post a weight chart or calendar in room next to the scale.  3.  Ask patient to weigh themselves daily first thing when they get up in the morning, before breakfast, with only their night gown on and record on the chart/calendar (if able).  4.  Record NH/TCU admission weight.  5.  Record daily am weight, with same clothes every day. If patient is in a wheelchair, note weight of wheel chair.   6.  Provide patient CHF education booklet and review with patient and family.  7.  Vital signs twice daily and prn. Check oxygen sats prn dyspnea.  8.  Apply Oxygen 2 or 3 liters/min by nasal cannula prn O2 Sat < 90%.   9.  Notify NP/MD:   1. If HR <50 bpm, SBP <90mmHg, or O2 Sat < 90%.   2. If weight is up more than 2 lbs. in one day or 5 lbs. in one week from their admission weight OR if patient has signs or symptoms of CHF, such as worsening dyspnea or leg edema.  10.  ACE-wraps or support stockings (knee high) to bilateral lower extremities prn edema. On in am and off at HS.   11.  Diet: 2 gm sodium cardiac diet, with additional diet modifications if ordered elsewhere.  12.  Fluid restriction:  1500cc/day, if hospital discharge sodium < 134.  13.  Dietician: CHF education  14.  PT/OT to Evaluate and Treat for deconditioning and CHF.  12.  Activity as tolerated   13.  PT to notify RN if wheelchair equipment has been modified (change in weight should also be noted on the patients weight calendar).    Heart Failure Follow-up Appointments and Instructions for TCU Discharge   _____An appointment to enroll the patient into [facility].R.E. Clinic may already have been made (see  "section  Your next appointments already scheduled ).  If there is a question about the appointment or you would like to speak with a C.O.R.E. nurse, please call one of the following locations:     [facility]):                                                    [phone]    [facility] ([place]):                                                   [phone]    [facility] ([place]):                   [phone]    _____If no C.O.R.E. appointment was made, please call one of the locations and schedule:    NP/PA appointment 14 days after hospital discharge if still in TCU    NP/PA appointment for 3-5 days after discharge from TCU    _____Have patient bring their med list and daily vitals/weight chart with them to appointment.    _____At discharge from the TCU give the patient the \"General Recommendations to Control Heart Failure When You Get Home  information for them to take home and their scale.  _____Upon discharge from the TCU reinforce the importance of home management of CHF including follow up in [facility].O.R.E. Clinic.  What is the C.O.R.E. Clinic?  Cardiomyopathy  Optimization  Rehabilitation  Education  The [facility].R.E. Clinic is a heart failure specialty clinic within [facility].  It is an outpatient disease management program that is based on a phase-by-phase approach, which is tailored to each patient s individual needs.  The cardiologist, nurse practitioner, physician assistant and nurses provide an ongoing outpatient care and treatment plan that guides heart failure and cardiomyopathy patients from evaluation and education to stabilization. This team works with the current primary care doctor and cardiologist to help patients:    Avoid hospitalizations    Slow the progression of the disease    Improve length and quality of life    Know who and when to call if heart failure symptoms appear    Receive easy access to quality health " "care and advice    Better understand your condition and treatment    Decrease the tremendous cost burden of heart failure care    Detect future heart problems before they become life threatening         Hospital Admission Date: 2017  LEON RAMIREZ   : 1928  Sex: Male        Attending Provider: Guille Gasca MD     Allergies:  Lisinopril, No Clinical Screening - See Comments, Aspirin    Infection:  None   Service:  HOSPITALIST    Ht:  1.727 m (5' 8\")   Wt:  79 kg (174 lb 3.2 oz)   Admission Wt:  78 kg (172 lb)    BMI:  26.49 kg/m 2   BSA:  1.95 m 2            Patient PCP Information     Provider PCP Type    Jona Ortiz MD General      ED Clinical Impression     Diagnosis Description Comment Added By Time Added    Cardiogenic shock (H) [R57.0] Cardiogenic shock (H) [R57.0]  Les Alfonso 2017  1:03 PM    Acute renal failure, unspecified acute renal failure type (H) [N17.9] Acute renal failure, unspecified acute renal failure type (H) [N17.9]  Chantal Lei MD 2017  6:40 PM      Hospital Problems as of 2017              Priority Class Noted POA    NSTEMI (non-ST elevated myocardial infarction) (H) Medium  2017 Unknown      Non-Hospital Problems as of 2017              Priority Class Noted    Nonproliferative diabetic retinopathy (H) Medium  2009    Pseudophakia Medium  2009    Other states following surgery of eye and adnexa Medium  2009    Vitamin D deficiency Medium  2010    Necrobiosis lipoidica diabeticorum (H) Medium  2011    Peripheral autonomic neuropathy due to diabetes mellitus (H) Medium  3/30/2012    Nocturnal leg cramps Medium  2012    DVT (deep venous thrombosis) (H) Medium  2013    Anticoagulation goal of INR 2 to 3 Medium  2013    Chronic left hip pain Medium  10/10/2014    History of CVA (cerebrovascular accident) Medium  10/10/2014    Impingement syndrome of right shoulder Medium  10/10/2014    " H/O laser photocoagulation of retina Medium  11/30/2015    Type 1 diabetes mellitus with diabetic polyneuropathy (H) Medium  12/9/2015    Anemia Medium  12/21/2015    Atrial fibrillation (H) Medium  11/8/2016      Code Status History     Date Active Date Inactive Code Status Order ID Comments User Context    12/4/2017  9:28 AM  DNR/DNI [id number]  Jocelyne Acosta MD Outpatient    12/1/2017 10:20 AM 12/4/2017  9:28 AM DNR/DNI [id number]  Jocelyne Acosta MD Inpatient    11/24/2017  2:41 PM 12/1/2017 10:20 AM Full Code [id number]  Guille Gasca MD Inpatient         Medication Review      START taking        Dose / Directions Comments    fluticasone 50 MCG/ACT spray   Commonly known as:  FLONASE   Used for:  Dyspepsia        Dose:  1 spray   Start taking on:  12/5/2017   Spray 1 spray into both nostrils daily   Quantity:  1 Bottle   Refills:  0        hydrALAZINE 10 MG tablet   Commonly known as:  APRESOLINE   Used for:  Acute systolic congestive heart failure (H)        Dose:  20 mg   Take 2 tablets (20 mg) by mouth 3 times daily   Quantity:  60 tablet   Refills:  0        insulin aspart 100 UNIT/ML injection   Commonly known as:  NovoLOG PEN   Used for:  Type 1 diabetes mellitus with diabetic polyneuropathy (H)        Dose:  1-7 Units   Inject 1-7 Units Subcutaneous 3 times daily (before meals) 1 unit for every 20 grams of carbohydrate   Refills:  0        insulin glargine 100 UNIT/ML injection   Commonly known as:  LANTUS   Used for:  Type 1 diabetes mellitus with diabetic polyneuropathy (H)        Dose:  5 Units   Inject 5 Units Subcutaneous daily   Refills:  0        isosorbide dinitrate 10 MG tablet   Commonly known as:  ISORDIL   Used for:  Acute systolic congestive heart failure (H)        Dose:  10 mg   Take 1 tablet (10 mg) by mouth 3 times daily   Refills:  0        nitroGLYcerin 0.4 MG sublingual tablet   Commonly known as:  NITROSTAT        For chest pain place 1 tablet under the tongue  "every 5 minutes for 3 doses. If symptoms persist 5 minutes after 1st dose call 911.   Quantity:  25 tablet   Refills:  0        ranitidine 150 MG tablet   Commonly known as:  ZANTAC   Used for:  Dyspepsia        Dose:  150 mg   Take 1 tablet (150 mg) by mouth At Bedtime   Quantity:  60 tablet   Refills:  0          CONTINUE these medications which may have CHANGED, or have new prescriptions. If we are uncertain of the size of tablets/capsules you have at home, strength may be listed as something that might have changed.        Dose / Directions Comments    warfarin 2 MG tablet   Commonly known as:  COUMADIN   This may have changed:    - medication strength  - how much to take  - when to take this   Used for:  Chronic atrial fibrillation (H)        Dose:  2 mg   Start taking on:  12/6/2017   Take 1 tablet (2 mg) by mouth daily - For history of DVT INR Goal 2-3    7.5 mg on Monday, Wednesday and Friday 5 mg on all other days   Quantity:  30 tablet   Refills:  0          CONTINUE these medications which have NOT CHANGED        Dose / Directions Comments    * Insulin Syringe/Needle 28G X 1/2\" 1 ML Misc        As directed. For administering insulin at home.   Refills:  0        * BD insulin syringe ULTRAFINE 30G X 1/2\" 0.5 ML   Generic drug:  insulin syringe-needle U-100        FOR ADMINISTERING INSULIN AT HOME (1 TIME FOR NPH & 3 TIMES FOR R INSULIN)   Refills:  0        clopidogrel 75 MG tablet   Commonly known as:  PLAVIX        Dose:  75 mg   Take 75 mg by mouth daily   Refills:  0        furosemide 20 MG tablet   Commonly known as:  LASIX        Dose:  20 mg   Take 20 mg by mouth daily   Refills:  0        MAGNESIUM-ZINC PO        Magnesium Zinc pill once daily at night   Refills:  0        ONETOUCH ULTRA test strip   Generic drug:  blood glucose monitoring        TEST 4 TIMES DAILY   Refills:  0        PAIN & FEVER 325 MG tablet   Generic drug:  acetaminophen        Dose:  325 mg   Take 325 mg by mouth every 4 " hours as needed   Refills:  0        vitamin D3 2000 UNITS Caps        Dose:  4000 Units   Take 4,000 Units by mouth daily   Refills:  0        * Notice:  This list has 2 medication(s) that are the same as other medications prescribed for you. Read the directions carefully, and ask your doctor or other care provider to review them with you.      STOP taking     HUMULIN N SC           insulin  UNIT/ML injection   Commonly known as:  HumuLIN N/NovoLIN N           insulin regular 100 UNIT/ML injection   Commonly known as:  HumuLIN R/NovoLIN R           losartan 100 MG tablet   Commonly known as:  COZAAR                   Summary of Visit     Reason for your hospital stay       New diagnosis of recent heart attack and end-stage congestive heart failure. We had numerous conversations regarding your goals of care, and you have chosen to continue medical treatments, including trial of rehab             After Care     Activity - Up with nursing assistance           Advance Diet as Tolerated       Follow this diet upon discharge: 2 gram sodium diet       Daily weights       Call Provider for weight gain of more than 2 pounds per day or 5 pounds per week.       Fall precautions           General info for SNF       Length of Stay Estimate: Short Term Care: Estimated # of Days <30  Condition at Discharge: Stable  Level of care:skilled   Rehabilitation Potential: Fair  Admission H&P remains valid and up-to-date: Yes  Recent Chemotherapy: N/A  Use Nursing Home Standing Orders: Yes       Glucose monitor nursing POCT       Before meals and at bedtime       Intake and output       Every shift       Mantoux instructions       Give two-step Mantoux (PPD) Per Facility Policy Yes             Referrals     Occupational Therapy Adult Consult       Evaluate and treat as clinically indicated.    Reason:  Generalized weakness, advanced CHF       Physical Therapy Adult Consult       Evaluate and treat as clinically  indicated.    Reason:  Generalized weakness, advanced CHF             Lab Orders     Basic metabolic panel                 Your next 10 appointments already scheduled     Dec 06, 2017 10:20 AM CST   LAB with [facility]   [facility] [place] ([facility])    [address] [place] [id number]   [phone]           Please do not eat 10-12 hours before your appointment if you are coming in fasting for labs on lipids, cholesterol, or glucose (sugar). This does not apply to pregnant women. Water, hot tea and black coffee (with nothing added) are okay. Do not drink other fluids, diet soda or chew gum.            Dec 06, 2017 11:15 AM CST   Return Visit with Gerda Sam DO   [facility] ([facility])    [address] [place], [place] [address]  [facility] [address]   [phone]            Dec 22, 2017  1:10 PM CST   CORE Enrollment with Marleny Blanton PA-C   [facility] ([facility])    [address] [place], [place] [address]  [facility] [address]   [phone]              Follow-Up Appointment Instructions     Future Labs/Procedures    Follow Up and recommended labs and tests     Comments:    Follow up with assisted physician.  The following labs/tests are recommended: INR on 12/5, BMP within 1 week    Follow up with Cardiology as scheduled      Follow-Up Appointment Instructions     Follow Up and recommended labs and tests       Follow up with assisted physician.  The following labs/tests are recommended: INR on 12/5, BMP within 1 week    Follow up with Cardiology as scheduled             Statement of Approval     Ordered          12/04/17 1125  I have reviewed and agree with all the recommendations and orders detailed in this document.  EFFECTIVE NOW     Approved and electronically signed by:  Jocelyne Acosta MD           12/04/17 0928  I have  reviewed and agree with all the recommendations and orders detailed in this document.  EFFECTIVE NOW     Approved and electronically signed by:  Jocelyne Acosta MD               General Recommendations To Control Heart Failure When You Get Home (Give at DC from [facility])       Heart Failure Instructions for Patients and Families: Please read and check off each of these important instructions as you do them when you get home.          Weight and Symptoms       ___ Put a scale in your bathroom.    ___ Post a weight chart or calendar next to your scale.    ___ Weigh yourself everyday as soon as you get up in the morning (before breakfast).  You should only be wearing your pajamas.  Write your weight on the chart/calendar.  ___ Bring your weight chart/calendar with you to all appointments.  ___ Call your doctor or nurse practitioner if you gain 2 pounds (in 1 day) or 5 pounds in (1 week) from your goal  good  weight.  Your good weight is also called your  dry  weight.  Your doctor or nurse will tell you what your good weight should be.    ___ Call your doctor or nurse practitioner if you have shortness of breath that gets worse over time, leg swelling or fatigue.       Medications and Diet       ___ Make sure to take your medication as prescribed.    ___ Bring a current list of your medication and all of your medicine bottles with you to all appointments.    ___ Limit fluids if you still have swelling or shortness of breath, or if your doctor tells you to do so.    ___ Eat less than 2000 mg of sodium (salt) every day. Read food labels, and do not add salt to meals. Remember, if you eat less salt you retain less fluid.  ___ Follow a heart healthy diet that is low in saturated fat.        Activity and Suggested Lifestyle Changes      ___ Stay active. Talk to your doctor about an exercise program that is safe for your heart.  ___ Stop smoking. Reduce alcohol use.      ___ Lose weight if you are overweight. Extra weight  puts a lot of stress on the heart.                 Control for Leg Swelling     ___ Keep your legs elevated (raised) as needed for swelling. If swelling is uncomfortable or elevation doesn t help, ask your doctor about using ACE wraps or support stockings.        What is the [facility]E. Clinic?  Cardiomyopathy  Optimization  Rehabilitation  Education    The [facility].R.E. Clinic is a heart failure specialty clinic within [facility].  It is an outpatient disease management program that is based on a phase-by-phase approach, which is tailored to each patient s individual needs.  The cardiologist, nurse practitioner, physician assistant and nurses provide an ongoing outpatient care and treatment plan that guides heart failure and cardiomyopathy patients from evaluation and education to stabilization. This team works with your current primary care doctor and cardiologist to help you:    - Avoid hospitalizations  - Slow the progression of the disease  - Improve length and quality of life  - Know who and when to call if heart failure symptoms appear  - Receive easy access to quality health care and advice  - Better understand your condition and treatment  - Decrease the tremendous cost burden of heart failure care  - Detect future heart problems before they become life threatening                                 Follow-up Appointments     ___ An appointment with the [facility] Clinic may already have been made for you (see section   Your next appointments already scheduled ).  If you have a question about your appointment, would like to speak with a C.O.R.E. nurse, or would like to become a C.O.R[facility]E. Clinic patient, please call one of the following locations:     - [facility]):                                             [phone]  - [facility]):                                            [phone]  - [facility] ([place]):                  [phone]      ___ Your C.O.R.E. Clinic Team will continue to educate you on your heart failure and may adjust medications based on your vital signs, lab work, and how you are feeling.  Therefore, it is very important to bring the following to all C.O.R.E. appointments:    - An accurate list of your medications  - Your medicine bottles  - Your weight chart/calendar

## 2017-11-24 NOTE — LETTER
Transition Communication Hand-off for Care Transitions to Next Level of Care Provider    Name: Dennys Ward   1928  MRN #: [id number]  Primary Care Provider: Jona Ortiz     Primary Clinic: [facility] [address]  [facility] [address]     Reason for Hospitalization:  Cardiogenic shock (H) [R57.0]  Admit Date/Time: 2017 10:34 AM  Discharge Date: 17  Payor Source: Payor: MEDICA / Plan: MEDICA PRIME SOLUTION / Product Type: Indemnity /     Readmission Assessment Measure (PELON) Risk Score/category: average         Reason for Communication Hand-off Referral: Admission diagnoses: CHF  Fragility    Discharge Plan: [facility]       Concern for non-adherence with plan of care:   Y/N: Yes  Discharge Needs Assessment:  Needs       Most Recent Value    Anticipated Changes Related to Illness none    Equipment Currently Used at Home -- [Has shower chair ]    Transportation Available car    # of Referrals Placed by CTS Post Acute Facilities, Senior Linkage Line, Transportation    Skilled Nursing Facility [facility] [phone], Fax: [phone]    PAS Number [id number]    Senior Linkage Line Referral Placed 17          Already enrolled in Tele-monitoring program and name of program:  yes  Follow-up specialty is recommended: Yes    Follow-up plan:  Future Appointments  Date Time Provider Department Center   2017 10:20 AM [facility]   2017 11:15 AM Gerda Sam DO [facility]   2017 1:10 PM [facility]       Any outstanding tests or procedures:        Referrals     Future Labs/Procedures    Occupational Therapy Adult Consult     Comments:    Evaluate and treat as clinically indicated.    Reason:  Generalized weakness, advanced CHF    Physical Therapy Adult Consult     Comments:    Evaluate and treat as clinically indicated.    Reason:  Generalized  weakness, advanced CHF            Key Recommendations:  Offered Hospice, pt is not ready for this. He wants to try TCU again.  Declined therapies while in the hospital. We are concerned he will be readmitted when he see's cardiology on 12/6    Margo Martínez RN    AVS/Discharge Summary is the source of truth; this is a helpful guide for improved communication of patient story

## 2017-11-24 NOTE — IP AVS SNAPSHOT
` [facility] CARDIAC SPECIALTY CARE: [phone]            Medication Administration Report for Dennys Ward as of 12/04/17 1217   Legend:    Given Hold Not Given Due Canceled Entry Other Actions    Time Time (Time) Time  Time-Action       Inactive    Active    Linked        Medications 11/28/17 11/29/17 11/30/17 12/01/17 12/02/17 12/03/17 12/04/17    acetaminophen (TYLENOL) tablet 650 mg  Dose: 650 mg Freq: EVERY 4 HOURS PRN Route: PO  PRN Reason: mild pain  Start: 11/24/17 1441   Admin Instructions: Alternate ibuprofen (if ordered) with acetaminophen.  Maximum acetaminophen dose from all sources = 75 mg/kg/day not to exceed 4 grams/day.               alum & mag hydroxide-simethicone (MYLANTA ES/MAALOX  ES) suspension 30 mL  Dose: 30 mL Freq: EVERY 4 HOURS PRN Route: PO  PRN Reason: indigestion  Start: 11/27/17 1331   Admin Instructions: Shake well.               bisacodyl (DULCOLAX) Suppository 10 mg  Dose: 10 mg Freq: DAILY PRN Route: RE  PRN Reason: constipation  Start: 11/24/17 1441   Admin Instructions: Hold for loose stools.  This is the third step of a three step constipation treatment.               clopidogrel (PLAVIX) tablet 75 mg  Dose: 75 mg Freq: DAILY Route: PO  Start: 11/25/17 0900    0854 (75 mg)-Given        1025 (75 mg)-Given        0934 (75 mg)-Given        0847 (75 mg)-Given        0808 (75 mg)-Given        0920 (75 mg)-Given        0922 (75 mg)-Given           fluticasone (FLONASE) 50 MCG/ACT spray 1 spray  Dose: 1 spray Freq: DAILY Route: BOTH NOSTRIL  Start: 11/28/17 0945    1745 (1 spray)-Given        (1032)-Not Given        0935 (1 spray)-Given        0849 (1 spray)-Given        0816 (1 spray)-Given        0919 (1 spray)-Given        (0923)-Not Given           glucose 40 % gel 15-30 g  Dose: 15-30 g Freq: EVERY 15 MIN PRN Route: PO  PRN Reason: low blood sugar  Start: 11/24/17 1441   Admin Instructions: Give 15 g for BG 51 to 69 mg/dL IF patient is conscious and able  to swallow. Give 30 g for BG less than or equal to 50 mg/dL IF patient is conscious and able to swallow. Do NOT give glucose gel via enteral tube.  IF patient has enteral tube: give apple juice 120 mL (4 oz or 15 g of CHO) via enteral tube for BG 51 to 69 mg/dL.  Give apple juice 240 mL (8 oz or 30 g of CHO) via enteral tube for BG less than or equal to 50 mg/dL.    ~Oral gel is preferable for conscious and able to swallow patient.   ~IF gel unavailable or patient refuses may provide apple juice 120 mL (4 oz or 15 g of CHO). Document juice on I and O flowsheet.              Or  dextrose 50 % injection 25-50 mL  Dose: 25-50 mL Freq: EVERY 15 MIN PRN Route: IV  PRN Reason: low blood sugar  Start: 11/24/17 1441   Admin Instructions: Use if have IV access, BG less than 70 mg/dL and meet dose criteria below:  Dose if conscious and alert (or disorientated) and NPO = 25 mL  Dose if unconscious / not alert = 50 mL  Vesicant. For ordered doses up to 25 mg, give IV Push undiluted. Give each 5g over 1 minute.              Or  glucagon injection 1 mg  Dose: 1 mg Freq: EVERY 15 MIN PRN Route: SC  PRN Reason: low blood sugar  PRN Comment: May repeat x 1 only  Start: 11/24/17 1441   Admin Instructions: May give SQ or IM. ONLY use glucagon IF patient has NO IV access AND is UNABLE to swallow AND blood glucose is LESS than or EQUAL to 50 mg/dL.  Give IV Push over 1 minute. Reconstitute with 1mL sterile water.               hydrALAZINE (APRESOLINE) tablet 20 mg  Dose: 20 mg Freq: EVERY 8 HOURS SCHEDULED Route: PO  Start: 11/26/17 1400   Admin Instructions: HOLD FOR SBP<90     0541 (20 mg)-Given       1416 (20 mg)-Given [C]       2230 (20 mg)-Given        0553 (20 mg)-Given       1453 (20 mg)-Given       2209 (20 mg)-Given        0628 (20 mg)-Given       1413-Hold       2148 (20 mg)-Given        0633 (20 mg)-Given       1325 (20 mg)-Given       2101 (20 mg)-Given        0635 (20 mg)-Given       1405 (20 mg)-Given       2148 (20  mg)-Given        0630 (20 mg)-Given       1403 (20 mg)-Given       2128 (20 mg)-Given        0615 (20 mg)-Given       [ ] 1400       [ ] 2200           insulin aspart (NovoLOG) inj (RAPID ACTING)  Dose: 1-5 Units Freq: AT BEDTIME Route: SC  Start: 11/24/17 2200   Admin Instructions: MEDIUM INSULIN RESISTANCE DOSING    Do Not give Bedtime Correction Insulin if BG less than  200.   For  - 249 give 1 units.   For  - 299 give 2 units.   For  - 349 give 3 units.   For  -399 give 4 units.   For BG greater than or equal to 400 give 5 units.  Notify provider if glucose greater than or equal to 350 mg/dL after administration of correction dose.  If given at mealtime, must be administered 5 min before meal or immediately after.     (2231)-Not Given        (2228)-Not Given [C]        (2150)-Not Given [C]        (2100)-Not Given        (2113)-Not Given        (2126)-Not Given        [ ] 2200           insulin aspart (NovoLOG) inj (RAPID ACTING)  Dose: 1-7 Units Freq: 3 TIMES DAILY BEFORE MEALS Route: SC  Start: 11/24/17 1700   Admin Instructions: Correction Scale - MEDIUM INSULIN RESISTANCE DOSING     Do Not give Correction Insulin if Pre-Meal BG less than 140.   For Pre-Meal  - 189 give 1 unit.   For Pre-Meal  - 239 give 2 units.   For Pre-Meal  - 289 give 3 units.   For Pre-Meal  - 339 give 4 units.   For Pre-Meal - 399 give 5 units.   For Pre-Meal -449 give 6 units  For Pre-Meal BG greater than or equal to 450 give 7 units.   To be given with prandial insulin, and based on pre-meal blood glucose.    Notify provider if glucose greater than or equal to 350 mg/dL after administration of correction dose.  If given at mealtime, must be administered 5 min before meal or immediately after.     0858 (1 Units)-Given [C]       1232 (1 Units)-Given [C]       1746 (2 Units)-Given [C]        (1022)-Not Given       1251 (3 Units)-Given       1751 (2 Units)-Given        (0927)-Not  Given       (1216)-Not Given [C]       1725 (3 Units)-Given        (0852)-Not Given       1147 (1 Units)-Given       1747-Hold [C]        (0810)-Not Given       (1324)-Not Given       (1735)-Not Given        (0819)-Not Given       (1302)-Not Given [C]       (1720)-Not Given        (0903)-Not Given       (1206)-Not Given       [ ] 1700           insulin glargine (LANTUS) injection 5 Units  Dose: 5 Units Freq: EVERY 24 HOURS Route: SC  Start: 12/04/17 1330          1214 (5 Units)-Given       [ ] 1330           isosorbide dinitrate (ISORDIL) tablet 10 mg  Dose: 10 mg Freq: 3 TIMES DAILY Route: PO  Start: 11/27/17 1100   Admin Instructions: Recommended to take on empty stomach.     0854 (10 mg)-Given [C]       1743 (10 mg)-Given [C]       2230 (10 mg)-Given        1025 (10 mg)-Given       1750 (10 mg)-Given       2208 (10 mg)-Given        0935 (10 mg)-Given       1641 (10 mg)-Given [C]       2148 (10 mg)-Given        0847 (10 mg)-Given       1554 (10 mg)-Given       2101 (10 mg)-Given        0808 (10 mg)-Given       1659 (10 mg)-Given       2146 (10 mg)-Given        0920 (10 mg)-Given       1655 (10 mg)-Given       2128 (10 mg)-Given        0922 (10 mg)-Given       [ ] 1600       [ ] 2200           lidocaine (viscous) (XYLOCAINE) 2 % 15 mL, alum & mag hydroxide-simethicone (MYLANTA ES/MAALOX  ES) 15 mL GI Cocktail  Dose: 30 mL Freq: ONCE PRN Route: PO  PRN Reason: moderate pain  Start: 11/28/17 0940              magnesium hydroxide (MILK OF MAGNESIA) suspension 30 mL  Dose: 30 mL Freq: DAILY PRN Route: PO  PRN Reason: constipation  Start: 11/24/17 1441   Admin Instructions: Hold for loose stools.  This is the second step of a three step constipation treatment.               naloxone (NARCAN) injection 0.1-0.4 mg  Dose: 0.1-0.4 mg Freq: EVERY 2 MIN PRN Route: IV  PRN Reason: opioid reversal  Start: 11/24/17 1441   Admin Instructions: For respiratory rate LESS than or EQUAL to 8.  Partial reversal dose:  0.1 mg titrated q  2 minutes for Analgesia Side Effects Monitoring Sedation Level of 3 (frequently drowsy, arousable, drifts to sleep during conversation).Full reversal dose:  0.4 mg bolus for Analgesia Side Effects Monitoring Sedation Level of 4 (somnolent, minimal or no response to stimulation).  Give IV Push undiluted up to 2mg. Give each 0.4mg over 15 seconds in emergency situations. For non-emergent situations further dilute in 9mL of NS to facilitate titration of response.               nitroGLYcerin (NITROSTAT) sublingual tablet 0.4 mg  Dose: 0.4 mg Freq: EVERY 5 MIN PRN Route: SL  PRN Reason: chest pain  Start: 11/24/17 1441              ondansetron (ZOFRAN-ODT) ODT tab 4 mg  Dose: 4 mg Freq: EVERY 6 HOURS PRN Route: PO  PRN Reasons: nausea,vomiting  Start: 11/24/17 1441   Admin Instructions: This is Step 1 of nausea and vomiting management.  If nausea not resolved in 15 minutes, go to Step 2 prochlorperazine (COMPAZINE). Do not push through foil backing. Peel back foil and gently remove. Place on tongue immediately. Administration with liquid unnecessary              Or  ondansetron (ZOFRAN) injection 4 mg  Dose: 4 mg Freq: EVERY 6 HOURS PRN Route: IV  PRN Reasons: nausea,vomiting  Start: 11/24/17 1441   Admin Instructions: This is Step 1 of nausea and vomiting management.  If nausea not resolved in 15 minutes, go to Step 2 prochlorperazine (COMPAZINE).  Irritant. For ordered doses up to 4 mg, give IV Push undiluted over 2-5 minutes.               Patient is already receiving anticoagulation with heparin, enoxaparin (LOVENOX), warfarin (COUMADIN)  or other anticoagulant medication  Freq: CONTINUOUS PRN Route: XX  Start: 11/24/17 1441              prochlorperazine (COMPAZINE) injection 5 mg  Dose: 5 mg Freq: EVERY 6 HOURS PRN Route: IV  PRN Reasons: nausea,vomiting  Start: 11/24/17 1441   Admin Instructions: This is Step 2 of nausea and vomiting management. Give if nausea not resolved 15 minutes after giving ondansetron  (ZOFRAN). If nausea not resolved in 15 minutes, go to Step 3 metoclopramide (REGLAN), if ordered.  For ordered doses up to 10 mg, give IV Push undiluted. Each 5mg over 1 minute.              Or  prochlorperazine (COMPAZINE) tablet 5 mg  Dose: 5 mg Freq: EVERY 6 HOURS PRN Route: PO  PRN Reason: vomiting  Start: 11/24/17 1441   Admin Instructions: This is Step 2 of nausea and vomiting management. Give if nausea not resolved 15 minutes after giving ondansetron (ZOFRAN). If nausea not resolved in 15 minutes, go to Step 3 metoclopramide (REGLAN), if ordered.              Or  prochlorperazine (COMPAZINE) Suppository 12.5 mg  Dose: 12.5 mg Freq: EVERY 12 HOURS PRN Route: RE  PRN Reasons: nausea,vomiting  Start: 11/24/17 1441   Admin Instructions: This is Step 2 of nausea and vomiting management. Give if nausea not resolved 15 minutes after giving ondansetron (ZOFRAN). If nausea not resolved in 15 minutes, go to Step 3 metoclopramide (REGLAN), if ordered.               ranitidine (ZANTAC) tablet 150 mg  Dose: 150 mg Freq: EVERY 24 HOURS Route: PO  Start: 11/28/17 2200    2230 (150 mg)-Given        2208 (150 mg)-Given        2148 (150 mg)-Given        2101 (150 mg)-Given        2144 (150 mg)-Given        2128 (150 mg)-Given        [ ] 2200           Reason ACE/ARB/ARNI order not selected  Freq: DOES NOT GO TO MAR Route: OTHER  PRN Reason: other  Start: 12/04/17 0928              senna-docusate (SENOKOT-S;PERICOLACE) 8.6-50 MG per tablet 1 tablet  Dose: 1 tablet Freq: 2 TIMES DAILY PRN Route: PO  PRN Reason: constipation  Start: 11/24/17 1441   Admin Instructions: If no bowel movement in 24 hours, increase to 2 tablets PO.  Hold for loose stools.  This is the first step of a three step constipation treatment.              Or  senna-docusate (SENOKOT-S;PERICOLACE) 8.6-50 MG per tablet 2 tablet  Dose: 2 tablet Freq: 2 TIMES DAILY PRN Route: PO  PRN Reason: constipation  Start: 11/24/17 1447   Admin Instructions: Hold for loose  stools.  This is the first step of a three step constipation treatment.               sodium chloride (PF) 0.9% PF flush 3 mL  Dose: 3 mL Freq: EVERY 8 HOURS Route: IK  Start: 12/04/17 0400                 (1206)-Not Given       [ ] 2000           Warfarin Therapy Reminder (Check START DATE - warfarin may be starting in the FUTURE)  Freq: CONTINUOUS PRN Route: XX  Start: 11/29/17 0913   Admin Instructions: *Note to reorder warfarin daily*  Pharmacy Warfarin Dosing Service  Patient is on Warfarin Therapy - check for daily order               warfarin-No DOSE today  Dose: 1 each Freq: NO DOSE TODAY (WARFARIN) Route: XX  Start: 12/04/17 1054   End: 12/04/17 2253   Admin Instructions: No dose of Warfarin due today per order           2253-Med Discontinued      Completed Medications  Medications 11/28/17 11/29/17 11/30/17 12/01/17 12/02/17 12/03/17 12/04/17         Dose: 20 mg Freq: ONCE Route: IV  Start: 12/04/17 1015   End: 12/04/17 1112   Admin Instructions: For ordered doses up to 40 mg, give IV Push undiluted over 1-2 minutes.           1111 (20 mg)-Given             Dose: 1 mg Freq: ONCE AT 6PM Route: PO  Start: 12/03/17 1800   End: 12/03/17 1833   Admin Instructions: INR = 3.1          1833 (1 mg)-Given              Dose: 5 mg Freq: ONCE AT 6PM Route: PO  Start: 12/02/17 1800   End: 12/02/17 1834   Admin Instructions: INR = 2.69         1834 (5 mg)-Given               Dose: 7.5 mg Freq: ONCE AT 6PM Route: PO  Start: 12/01/17 1800   End: 12/01/17 1821       1821 (7.5 mg)-Given             Discontinued Medications  Medications 11/28/17 11/29/17 11/30/17 12/01/17 12/02/17 12/03/17 12/04/17         Freq: 3 TIMES DAILY BEFORE MEALS Route: SC  Start: 11/27/17 1200   End: 12/03/17 1300   Admin Instructions: Dose = 1 units/carb unit  If given at mealtime, must be administered 5 min before meal or immediately after.     0901 (2 Units)-Given [C]       (1200)-Not Given [C]       1747 (5 Units)-Given [C]        (1022)-Not  Given       1252 (2 Units)-Given       1751 (1 Units)-Given        0937 (1 Units)-Given [C]       (1214)-Not Given [C]       1948 (1 Units)-Given        (0852)-Not Given [C]       1149 (1 Units)-Given       1821 (4 Units)-Given [C]        (1046)-Not Given [C]       (1404)-Not Given [C]       (1932)-Not Given [C]        (0824)-Not Given [C]       1300-Med Discontinued                Dose: 10 Units Freq: EVERY 24 HOURS Route: SC  Start: 12/03/17 1300   End: 12/04/17 0714         1403 (10 Units)-Given        0714-Med Discontinued         Dose: 20 Units Freq: EVERY 24 HOURS Route: SC  Start: 11/27/17 1200   End: 12/03/17 1300    1233 (20 Units)-Given [C]        1252 (20 Units)-Given        1228 (20 Units)-Given        1150 (20 Units)-Given        1321 (20 Units)-Given        1300-Med Discontinued                Rate: 50 mL/hr Freq: CONTINUOUS Route: IV  Start: 11/29/17 1200   End: 12/04/17 1108   Admin Instructions: 500 ml total infused volume      1449 ( )-New Bag            1108-Med Discontinued

## 2017-11-24 NOTE — IP AVS SNAPSHOT
"    [facility] CARDIAC SPECIALTY CARE: [phone]                                              INTERAGENCY TRANSFER FORM - LAB / IMAGING / EKG / EMG RESULTS   2017                    Hospital Admission Date: 2017  LEON RAMIREZ   : 1928  Sex: Male        Attending Provider: Guille Gasca MD     Allergies:  Lisinopril, No Clinical Screening - See Comments, Aspirin    Infection:  None   Service:  HOSPITALIST    Ht:  1.727 m (5' 8\")   Wt:  79 kg (174 lb 3.2 oz)   Admission Wt:  78 kg (172 lb)    BMI:  26.49 kg/m 2   BSA:  1.95 m 2            Patient PCP Information     Provider PCP Type    Jona Ortiz MD General         Lab Results - 3 Days      Glucose by meter [[id number]]  Resulted: 17 0846, Result status: Final result    Ordering provider: Guille Gasca MD  17 0837 Resulting lab: POINT OF CARE TEST, GLUCOSE    Specimen Information    Type Source Collected On     17 0837          Components       Value Reference Range Flag Lab   Glucose 83 70 - 99 mg/dL  170            Basic metabolic panel [[id number]] (Abnormal)  Resulted: 17 0640, Result status: Final result    Ordering provider: Murray Son MD  17 0000 Resulting lab: [facility]    Specimen Information    Type Source Collected On   Blood  17 0608          Components       Value Reference Range Flag Lab   Sodium 127 133 - 144 mmol/L L FrStHsLb   Potassium 5.0 3.4 - 5.3 mmol/L  FrStHsLb   Chloride 94 94 - 109 mmol/L  FrStHsLb   Carbon Dioxide 22 20 - 32 mmol/L  FrStHsLb   Anion Gap 11 3 - 14 mmol/L  FrStHsLb   Glucose 101 70 - 99 mg/dL H FrStHsLb   Urea Nitrogen 74 7 - 30 mg/dL H FrStHsLb   Creatinine 2.20 0.66 - 1.25 mg/dL H FrStHsLb   GFR Estimate 28 >60 mL/min/1.7m2 L FrStHsLb   Comment:  Non  GFR Calc   GFR Estimate If Black 34 >60 mL/min/1.7m2 L FrStHsLb   Comment:  African American GFR Calc   Calcium 8.8 8.5 - 10.1 mg/dL  FrStHsLb "            INR [[id number]] (Abnormal)  Resulted: 12/04/17 0637, Result status: Final result    Ordering provider: Guille Gasca MD  12/04/17 0000 Resulting lab: [facility]    Specimen Information    Type Source Collected On   Blood  12/04/17 0608          Components       Value Reference Range Flag Lab   INR 3.71 0.86 - 1.14 H FrStHsLb            Glucose by meter [[id number]] (Abnormal)  Resulted: 12/04/17 0516, Result status: Final result    Ordering provider: Guille Gasca MD  12/04/17 0459 Resulting lab: POINT OF CARE TEST, GLUCOSE    Specimen Information    Type Source Collected On     12/04/17 0459          Components       Value Reference Range Flag Lab   Glucose 134 70 - 99 mg/dL H 170            Glucose by meter [[id number]]  Resulted: 12/04/17 0156, Result status: Final result    Ordering provider: Guille Gasca MD  12/04/17 0152 Resulting lab: POINT OF CARE TEST, GLUCOSE    Specimen Information    Type Source Collected On     12/04/17 0152          Components       Value Reference Range Flag Lab   Glucose 75 70 - 99 mg/dL  170            Glucose by meter [[id number]] (Abnormal)  Resulted: 12/03/17 2116, Result status: Final result    Ordering provider: Guille Gasca MD  12/03/17 2108 Resulting lab: POINT OF CARE TEST, GLUCOSE    Specimen Information    Type Source Collected On     12/03/17 2108          Components       Value Reference Range Flag Lab   Glucose 147 70 - 99 mg/dL H 170            Glucose by meter [[id number]] (Abnormal)  Resulted: 12/03/17 1706, Result status: Final result    Ordering provider: Guille Gasca MD  12/03/17 1658 Resulting lab: POINT OF CARE TEST, GLUCOSE    Specimen Information    Type Source Collected On     12/03/17 1658          Components       Value Reference Range Flag Lab   Glucose 131 70 - 99 mg/dL H 170            Glucose by meter [[id number]] (Abnormal)  Resulted: 12/03/17 1216, Result status: Final result     Ordering provider: Guille Gasca MD  12/03/17 1209 Resulting lab: POINT OF CARE TEST, GLUCOSE    Specimen Information    Type Source Collected On     12/03/17 1209          Components       Value Reference Range Flag Lab   Glucose 169 70 - 99 mg/dL H 170            Glucose by meter [[id number]]  Resulted: 12/03/17 0806, Result status: Final result    Ordering provider: Guille Gasca MD  12/03/17 0756 Resulting lab: POINT OF CARE TEST, GLUCOSE    Specimen Information    Type Source Collected On     12/03/17 0756          Components       Value Reference Range Flag Lab   Glucose 71 70 - 99 mg/dL  170            Basic metabolic panel [[id number]] (Abnormal)  Resulted: 12/03/17 0601, Result status: Final result    Ordering provider: Murray Son MD  12/03/17 0000 Resulting lab: [facility]    Specimen Information    Type Source Collected On   Blood  12/03/17 0534          Components       Value Reference Range Flag Lab   Sodium 128 133 - 144 mmol/L L FrStHsLb   Potassium 4.8 3.4 - 5.3 mmol/L  FrStHsLb   Chloride 95 94 - 109 mmol/L  FrStHsLb   Carbon Dioxide 24 20 - 32 mmol/L  FrStHsLb   Anion Gap 9 3 - 14 mmol/L  FrStHsLb   Glucose 70 70 - 99 mg/dL  FrStHsLb   Urea Nitrogen 74 7 - 30 mg/dL H FrStHsLb   Creatinine 2.18 0.66 - 1.25 mg/dL H FrStHsLb   GFR Estimate 29 >60 mL/min/1.7m2 L FrStHsLb   Comment:  Non  GFR Calc   GFR Estimate If Black 35 >60 mL/min/1.7m2 L FrStHsLb   Comment:  African American GFR Calc   Calcium 8.9 8.5 - 10.1 mg/dL  FrStHsLb            INR [[id number]] (Abnormal)  Resulted: 12/03/17 0558, Result status: Final result    Ordering provider: Guille Gasca MD  12/03/17 0000 Resulting lab: [facility]    Specimen Information    Type Source Collected On   Blood  12/03/17 0534          Components       Value Reference Range Flag Lab   INR 3.10 0.86 - 1.14 H FrStHsLb            Glucose by meter [[id number]]  Resulted: 12/03/17 0201,  Result status: Final result    Ordering provider: Guille Gasca MD  12/03/17 0155 Resulting lab: POINT OF CARE TEST, GLUCOSE    Specimen Information    Type Source Collected On     12/03/17 0155          Components       Value Reference Range Flag Lab   Glucose 83 70 - 99 mg/dL  170            Glucose by meter [[id number]] (Abnormal)  Resulted: 12/02/17 2111, Result status: Final result    Ordering provider: Guille Gasca MD  12/02/17 2106 Resulting lab: POINT OF CARE TEST, GLUCOSE    Specimen Information    Type Source Collected On     12/02/17 2106          Components       Value Reference Range Flag Lab   Glucose 162 70 - 99 mg/dL H 170            Glucose by meter [[id number]] (Abnormal)  Resulted: 12/02/17 1716, Result status: Final result    Ordering provider: Guille Gasca MD  12/02/17 1712 Resulting lab: POINT OF CARE TEST, GLUCOSE    Specimen Information    Type Source Collected On     12/02/17 1712          Components       Value Reference Range Flag Lab   Glucose 125 70 - 99 mg/dL H 170            Glucose by meter [[id number]] (Abnormal)  Resulted: 12/02/17 1330, Result status: Final result    Ordering provider: Guille Gasca MD  12/02/17 1324 Resulting lab: POINT OF CARE TEST, GLUCOSE    Specimen Information    Type Source Collected On     12/02/17 1324          Components       Value Reference Range Flag Lab   Glucose 138 70 - 99 mg/dL H 170            Glucose by meter [[id number]] (Abnormal)  Resulted: 12/02/17 0641, Result status: Final result    Ordering provider: Guille Gasca MD  12/02/17 0632 Resulting lab: POINT OF CARE TEST, GLUCOSE    Specimen Information    Type Source Collected On     12/02/17 0632          Components       Value Reference Range Flag Lab   Glucose 126 70 - 99 mg/dL H 170            INR [[id number]] (Abnormal)  Resulted: 12/02/17 0630, Result status: Final result    Ordering provider: Guille Gasca MD  12/02/17 0000 Resulting lab:  [facility]    Specimen Information    Type Source Collected On   Blood  12/02/17 0605          Components       Value Reference Range Flag Lab   INR 2.69 0.86 - 1.14 H FrStHsLb            Basic metabolic panel [[id number]] (Abnormal)  Resulted: 12/02/17 0627, Result status: Final result    Ordering provider: Murray Son MD  12/02/17 0000 Resulting lab: [facility]    Specimen Information    Type Source Collected On   Blood  12/02/17 0605          Components       Value Reference Range Flag Lab   Sodium 130 133 - 144 mmol/L L FrStHsLb   Potassium 4.6 3.4 - 5.3 mmol/L  FrStHsLb   Chloride 95 94 - 109 mmol/L  FrStHsLb   Carbon Dioxide 24 20 - 32 mmol/L  FrStHsLb   Anion Gap 11 3 - 14 mmol/L  FrStHsLb   Glucose 75 70 - 99 mg/dL  FrStHsLb   Urea Nitrogen 77 7 - 30 mg/dL H FrStHsLb   Creatinine 2.30 0.66 - 1.25 mg/dL H FrStHsLb   GFR Estimate 27 >60 mL/min/1.7m2 L FrStHsLb   Comment:  Non  GFR Calc   GFR Estimate If Black 33 >60 mL/min/1.7m2 L FrStHsLb   Comment:  African American GFR Calc   Calcium 8.6 8.5 - 10.1 mg/dL  FrStHsLb            Glucose by meter [[id number]]  Resulted: 12/02/17 0601, Result status: Final result    Ordering provider: Guille Gasca MD  12/02/17 0555 Resulting lab: POINT OF CARE TEST, GLUCOSE    Specimen Information    Type Source Collected On     12/02/17 0555          Components       Value Reference Range Flag Lab   Glucose 75 70 - 99 mg/dL  170            Glucose by meter [[id number]] (Abnormal)  Resulted: 12/02/17 0211, Result status: Final result    Ordering provider: Guille Gasca MD  12/02/17 0201 Resulting lab: POINT OF CARE TEST, GLUCOSE    Specimen Information    Type Source Collected On     12/02/17 0201          Components       Value Reference Range Flag Lab   Glucose 102 70 - 99 mg/dL H 170            Glucose by meter [[id number]] (Abnormal)  Resulted: 12/02/17 0146, Result status: Final result    Ordering provider:  Guille Gasca MD  12/02/17 0142 Resulting lab: POINT OF CARE TEST, GLUCOSE    Specimen Information    Type Source Collected On     12/02/17 0142          Components       Value Reference Range Flag Lab   Glucose 65 70 - 99 mg/dL L 170            Glucose by meter [[id number]] (Abnormal)  Resulted: 12/01/17 2211, Result status: Final result    Ordering provider: Guille Gasca MD  12/01/17 2200 Resulting lab: POINT OF CARE TEST, GLUCOSE    Specimen Information    Type Source Collected On     12/01/17 2200          Components       Value Reference Range Flag Lab   Glucose 121 70 - 99 mg/dL H 170            Glucose by meter [[id number]]  Resulted: 12/01/17 2101, Result status: Final result    Ordering provider: Guille Gasca MD  12/01/17 2054 Resulting lab: POINT OF CARE TEST, GLUCOSE    Specimen Information    Type Source Collected On     12/01/17 2054          Components       Value Reference Range Flag Lab   Glucose 79 70 - 99 mg/dL  170            Glucose by meter [[id number]]  Resulted: 12/01/17 1711, Result status: Final result    Ordering provider: Guille Gasca MD  12/01/17 1707 Resulting lab: POINT OF CARE TEST, GLUCOSE    Specimen Information    Type Source Collected On     12/01/17 1707          Components       Value Reference Range Flag Lab   Glucose 74 70 - 99 mg/dL  170            Glucose by meter [[id number]] (Abnormal)  Resulted: 12/01/17 1151, Result status: Final result    Ordering provider: Guille Gasca MD  12/01/17 1144 Resulting lab: POINT OF CARE TEST, GLUCOSE    Specimen Information    Type Source Collected On     12/01/17 1144          Components       Value Reference Range Flag Lab   Glucose 165 70 - 99 mg/dL H 170            Glucose by meter [[id number]]  Resulted: 12/01/17 0837, Result status: Final result    Ordering provider: Guille Gasca MD  12/01/17 0830 Resulting lab: POINT OF CARE TEST, GLUCOSE    Specimen Information    Type Source Collected  On     17 0830          Components       Value Reference Range Flag Lab   Glucose 79 70 - 99 mg/dL  170            Heparin Xa (10a) Level [[id number]]  Resulted: 17 0610, Result status: Final result    Ordering provider: Murray Son MD  17 0000 Resulting lab: [facility]    Specimen Information    Type Source Collected On   Blood  17 0544          Components       Value Reference Range Flag Lab   Heparin 10A Level 0.23 IU/mL  FrStHsLb   Comment:         Therapeutic Range:    UFH:   0.15-0.35 IU/mL for low             intensity dosing           0.30-0.70 IU/mL for high             intensity dosing for             DVT and PE    Enoxaparin: If administered             once daily with dose             1.5mg/k.0-2.0 IU/mL                If administered             twice daily with dose             1mg/k.60-1.0 IU/mL              INR [[id number]] (Abnormal)  Resulted: 17 0609, Result status: Final result    Ordering provider: Guille Gasca MD  17 0000 Resulting lab: [facility]    Specimen Information    Type Source Collected On   Blood  17 0544          Components       Value Reference Range Flag Lab   INR 2.02 0.86 - 1.14 H FrStHsLb            Basic metabolic panel [[id number]] (Abnormal)  Resulted: 17 0608, Result status: Final result    Ordering provider: Murray Son MD  17 0000 Resulting lab: [facility]    Specimen Information    Type Source Collected On   Blood  17 0544          Components       Value Reference Range Flag Lab   Sodium 129 133 - 144 mmol/L L FrStHsLb   Potassium 4.3 3.4 - 5.3 mmol/L  FrStHsLb   Chloride 97 94 - 109 mmol/L  FrStHsLb   Carbon Dioxide 23 20 - 32 mmol/L  FrStHsLb   Anion Gap 9 3 - 14 mmol/L  FrStHsLb   Glucose 85 70 - 99 mg/dL  FrStHsLb   Urea Nitrogen 78 7 - 30 mg/dL H FrStHsLb   Creatinine 2.27 0.66 - 1.25 mg/dL H FrStHsLb   GFR Estimate 27 >60  mL/min[facility]1.7m2 [facility]   Comment:  Non  GFR Calc   GFR Estimate If Black 33 >60 mL/min/1.7m2 L FrStHsb   Comment:  African American GFR Calc   Calcium 8.8 8.5 - 10.1 mg/dL  [facility]            Glucose by meter [[id number]] (Abnormal)  Resulted: 17 0216, Result status: Final result    Ordering provider: Guille Gasca MD  17 Resulting lab: POINT OF CARE TEST, GLUCOSE    Specimen Information    Type Source Collected On     17          Components       Value Reference Range Flag Lab   Glucose 137 70 - 99 mg/dL H 170            Testing Performed By     Lab - Abbreviation Name Director Address Valid Date Range    14 -  [facility] Unknown [address] [place] [address] 05/08/15 1057 - Present    170 - Unknown POINT OF CARE TEST, GLUCOSE Unknown Unknown 10/31/11 1114 - Present            Unresulted Labs (24h ago through future)    Start       Ordered    17 0600  Basic metabolic panel  DAILY,   Routine      17 0946    17 0600  INR  DAILY,   Routine      17 1441         ECG/EMG Results      Echocardiogram Complete [[id number]]  Resulted: 17 1227, Result status: Edited Result - FINAL    Ordering provider: Chantal Lei MD  17 1214 Resulted by: Ced Packer MD    Performed: 17 1230 - 17 1302 Resulting lab: RADIOLOGY RESULTS    Narrative:       [id number]  [facility]  [id number]  [id number]^JEAN^CHANTAL^VANGIE                                                                          Version 2        [facility]  Echocardiography Laboratory  [address] [place] [address]        Name: LEON RAMIREZ  MRN: [id number]  : 1928  Study Date: 2017 12:27 PM  Age: 89 yrs  Gender: Male  Patient Location: [facility]  Reason For Study: Ischemic Heart Disease  Ordering Physician: CHANTAL LEI  Referring Physician: SAMANTHA PMD  Performed By: Antonio Delong RDCS     BSA: 1.9  m2  Height: 68 in  Weight: 172 lb  HR: 85  BP: 101/60 mmHg  _____________________________________________________________________________  __        Procedure  Complete Portable Echo Adult. Contrast Lumason.  _____________________________________________________________________________  __        Interpretation Summary     The left ventricle is normal in size. Left ventricular systolic function is  severely reduced. The visual ejection fraction is estimated at <20%. There is  severe global hypokinesis of the left ventricle with minor regional variation.  There is no thrombus seen in the left ventricle.  The right ventricle is normal size. The right ventricular systolic function is  mild to moderately reduced.  Moderate left atrial enlargement.  Mild to moderate mitral regurgitation.  No pericardial effusion.  No previous study for comparison.  _____________________________________________________________________________  __        Left Ventricle  The left ventricle is normal in size. Left ventricular systolic function is  severely reduced. The visual ejection fraction is estimated at <20%. There is  severe global hypokinesis of the left ventricle with minor regional variation.  There is no thrombus seen in the left ventricle.     Right Ventricle  The right ventricle is normal size. The right ventricular systolic function is  mild to moderately reduced.     Atria  The left atrium is moderately dilated. Right atrial size is normal. There is  no color Doppler evidence of an atrial shunt.     Mitral Valve  The mitral valve leaflets are mildly thickened. There is mild to moderate (1-  2+) mitral regurgitation.        Tricuspid Valve  There is trace to mild tricuspid regurgitation. The right ventricular systolic  pressure is approximated at 33.8 mmHg plus the right atrial pressure.     Aortic Valve  There is mild trileaflet aortic sclerosis. No aortic regurgitation is present.  No aortic stenosis is present.     Pulmonic  Valve  There is trace pulmonic valvular regurgitation. There is no pulmonic valvular  stenosis.     Vessels  The aortic root is normal size. Normal size ascending aorta. Dilated inferior  vena cava.     Pericardium  There is no pericardial effusion.        Rhythm  The rhythm was undetermined.  _____________________________________________________________________________  __  MMode/2D Measurements & Calculations  IVSd: 1.1 cm     LVIDd: 5.5 cm  LVIDs: 5.0 cm  LVPWd: 1.1 cm  FS: 9.3 %  EDV(Teich): 149.3 ml  ESV(Teich): 119.1 ml  LV mass(C)d: 237.6 grams  LV mass(C)dI: 124.0 grams/m2  Ao root diam: 3.3 cm  LA dimension: 4.2 cm  asc Aorta Diam: 3.3 cm  LA/Ao: 1.3  LA Volume (BP): 82.9 ml  LA Volume Index (BP): 43.2 ml/m2  RWT: 0.39           Doppler Measurements & Calculations  MV E max debra: 69.5 cm/sec  MV dec time: 0.18 sec  TR max debra: 290.5 cm/sec  TR max P.8 mmHg  E/E' av.4  Lateral E/e': 5.5  Medial E/e': 13.2           _____________________________________________________________________________  __           Report approved by: Pauline Ortega 2017 01:23 PM       1    Type Source Collected On     17 1227          View Image (below)              Encounter-Level Documents:     There are no encounter-level documents.      Order-Level Documents:     There are no order-level documents.

## 2017-11-24 NOTE — ED PROVIDER NOTES
History     Chief Complaint:  shortness of breath     HPI   Fair historian.  Dennys Ward is a diabetic 89 year old male with a history of atrial fibrillation and DVT on warfarin and clopidogrel who presents with son for evaluation of shortness of breath. About 1.5 weeks ago patient reports onset of some URI-type symptoms with dry cough and congestion, associated with some increased belching which progresses into cough, nausea, and shortness of breath. No abdominal pain. He also reports subjective fevers and chills, fatigue, decreased appetite recently. Last night he reports onset of worse dyspnea, noting he had to sit up on the edge of the bed every 2-3 hours to get his breath back. He notes some vague chest pains recently, but these are minimal and nonspecific. Yesterday he reports an isolated episode of non-bloody diarrhea, preceded by constipation. The patient denies any dysuria, dizziness/lightheadedness, headache, recent ill contacts, rash, or any other acute symptoms.  He has received his influenza vaccination this year.      CARDIAC RISK FACTORS:  Sex:    M  Tobacco/Illicit drugs: Former smoker, quit 40 years  Hypertension:   N  Hyperlipidemia:  N  Diabetes:   Y  Family History:  CAD in mother   Personal History: Hx atrial fibrillation only       Allergies:  Lisinopril (diarrhea)  Aspirin (rash)    Medications:    Warfarin  Clopidogrel  Magnesium-zinc  Losartan  Insulin regular  Furosemide  cholecalciferol     Past Medical History:    DM type I  DVT  atrial fibrillation  Anemia  Necrobiosis lipoidica diabeticorum   CVA  Osteoarthritis     Past Surgical History:    Pancreatectomy     Family History:    CAD (mother)    Social History:  Former smoker, quit 20 years. Non-drinker.  Marital Status:   [2]  Resides at [facility] with wife      Review of Systems   Constitutional: Positive for appetite change, chills, fatigue and fever (subjective).   HENT: Positive for dental problem.   "  Respiratory: Positive for cough and shortness of breath.    Cardiovascular: Positive for chest pain.   Gastrointestinal: Positive for constipation, diarrhea and nausea. Negative for abdominal pain and vomiting.   Musculoskeletal: Positive for back pain (chronic).   All other systems reviewed and are negative.      Physical Exam     Patient Vitals for the past 24 hrs:   BP Temp Temp src Pulse Heart Rate Resp SpO2 Height Weight   11/24/17 1401 92/59 - - - 73 21 99 % - -   11/24/17 1351 (!) 85/56 - - - - - - - -   11/24/17 1350 (!) 88/61 - - - 76 18 99 % - -   11/24/17 1330 90/61 - - - 80 11 99 % - -   11/24/17 1315 103/61 - - - 72 13 99 % - -   11/24/17 1300 94/60 - - - 79 21 98 % - -   11/24/17 1250 94/66 - - - 109 19 96 % - -   11/24/17 1220 99/71 - - - 89 19 - - -   11/24/17 1145 101/60 - - - 88 16 96 % - -   11/24/17 1130 97/61 - - - - - 96 % - -   11/24/17 1120 97/65 - - - - - 98 % - -   11/24/17 1100 98/61 - - - - - 97 % - -   11/24/17 1045 108/73 - - - - - 97 % - -   11/24/17 1042 (!) 89/59 96  F (35.6  C) Oral 103 103 14 97 % 1.727 m (5' 8\") 78 kg (172 lb)        Physical Exam  Gen: Pleasant, appears stated age.    Eye:   Pupils are equal, round, and reactive.     Sclera non-injected.    ENT:   Moist mucus membranes.     Normal tongue.    Oropharynx without lesions.    Cardiac:     Irregularly irregular   No murmurs, gallops, or rubs.    Pulmonary:     Clear to auscultation bilaterally.    No wheezes, rales, or rhonchi.    Abdomen:     Normal active bowel sounds.     Abdomen is soft and non-distended, without focal tenderness.   Large old upper abdominal scar    Musculoskeletal:     Normal movement of all extremities without evidence for deficit.    Extremities:    No edema.    Skin:   Warm and dry. No rash.  Well perfused.  No cyanosis.    Neurologic:    Non-focal exam without asymmetric weakness or numbness.    Normal tone    Psychiatric:     Normal affect with appropriate interaction with " examiner.      Emergency Department Course   ECG:  ECG (10:45:54):  Indication: shortness of breath    Rate 104 bpm. OK interval *. QRS duration 110. QT/QTc 352/462. P-R-T axes *, -70, 118.   atrial fibrillation with rapid ventricular response   left axis deviation  Inferior infarct, age undetermined  Anterolateral infarct, age undetermined  abnormal ECG  No previous ECGs available for comparison.   Interpreted at 1159 by Chantal Lei MD.     Imaging:  Radiographic findings were communicated with the patient and family who voiced understanding of the findings.  XR Chest 2 views:   Small bibasilar pleural effusions with some minimal atelectasis or infiltrate left lung base.  Results per Radiology.      Echocardiogram:   The left ventricle is normal in size. Left ventricular systolic function is severely reduced. The visual ejection fraction is estimated at <20%. There is severe global hypokinesis of the left ventricle with minor regional variation.  There is no thrombus seen in the left ventricle.  The right ventricle is normal size. The right ventricular systolic function is mild to moderately reduced.  Moderate left atrial enlargement.  Mild to moderate mitral regurgitation.  No pericardial effusion.  No previous study for comparison.   Results per Radiology.     Laboratory:  Laboratory findings were communicated with the patient and family who voiced understanding of the findings.  Lactic acid (initial at 1054): 5.4 (HH)   Lactic acid (repeat at 1414): 3.1 (H)  INR: 6.51 (HH)   Troponin-i (at 1054): 3.593 (HH)  Troponin-i (repeat at 1216): 3.604 (HH)  BNP: >18754 (H)   Influenza A/B Antigen: negative    CBC w/diff: RBC 3.87 (L), Hgb 11.7 (L), Hct 35.6 (L), o/w WNL (WBC 9.3, Plt 278)  CMP: K 5.5 (H), gluc 272 (H), BUN 70 (H), Cr 2.24 (H), eGFR 28 (L), o/w WNL   Blood culture: pending x2    Procalcitonin: pending    Interventions:  1131: lactated ringers bolus 2340ml, IV   1219: Zosyn 3.375g, IV       Emergency Department Course:  Past medical records, nursing notes, and vitals reviewed. Placed on monitors.   1114: I performed an exam of the patient as documented above.   Care Everywhere obtained for [facility] and records reviewed.    Peripheral IV access established. Blood drawn and sent. The above EKG, imaging study, and labs were obtained. The patient was given the above interventions with improvement.    1129: Call received from  with reports of critically high lactic acid at 5.4  Stat echocardiogram ordered.    1240: I rechecked patient. [facility] is performing echocardiogram currently.  Findings and plan explained to the Patient and son who consents to admission.     1243: Discussed the case with Dr. Sam on-call for Cardiology    1257: Verbal report from [facility], with echocardiogram showing EF 20%. Formal report pending.    1257: Discussed the patient with Dr. Gasca of the Hospitalist Service, who will admit the patient to a monitored bed for further monitoring, evaluation, and treatment.        Impression & Plan      Medical Decision Making:  Dennys Ward is a 89 year old male with a history of atrial fibrillation on coumadin and CKD who presents today with a myriad of symptoms including chest discomfort, shortness of breath, and generalized malaise. On initial evaluation, the patient was hypotensive with systolics in the 60s. Lungs were clear and he appeared well perfused. EKG demonstrates atrial fibrillation with 1-2mm of ST elevation in lead V3. He at this point does not meet criteria for acute STEMI. Labs are notable for a very elevated lactate of 5.4. He also had a troponin elevated to 3.6. Bedside echocardiogram demonstrates that his ejection fraction has decreased from 55% about a year ago to 20% today. Overall, his presentation is consistent with cardiogenic shock. He has had a URI for the past several days although chest radiography seems to demonstrate findings more consistent with  some volume overload rather than acute pneumonia. In addition, he does not have an elevated white blood cell count. He was given broad spectrum antibiotics although given concern for cardiogenic shock he did not receive the 30cc/kg bolus. He did receive about 500cc which he tolerated well, although had a slight decreased in his SpO2 to the high 80s. He is on 2L nasal cannula and saturating near 99%. The patient was also noted to have a markedly elevated INR to 6.5. The cardiogenic shock it seems is likely related to an NSTEMI. Given the elevated INR, additional blood thinning medications were not ordered. I discussed the case with Dr. Sam who recommends continued medical management in the CCU. While in the ED, the patient remained hemodynamically stable. He had one episode when blood pressures dipped to 80s systolic although continued to seem well perfused. Blood pressure then recovered to 92/59 without intervention. At this point I think he is stable to be admitted to the CCU.    Critical Care time:  was 45 minutes for this patient excluding procedures.    Diagnosis:    ICD-10-CM    1. Cardiogenic shock (H) R57.0 Blood culture     Procalcitonin     Procalcitonin     Lactic acid whole blood     Troponin I     Hemoglobin A1c     Glucose by meter     Glucose by meter     CANCELED: Procalcitonin     CANCELED: Procalcitonin   2. Acute renal failure, unspecified acute renal failure type (H) N17.9        Disposition:  Admitted to CCU      Nicholas Duron  11/24/2017    EMERGENCY DEPARTMENT  INicholas, am serving as a scribe at 11:14 AM on 11/24/2017 to document services personally performed by Chantal Lei MD based on my observations and the provider's statements to me.           Chantal Lei MD  11/24/17 [id number]

## 2017-11-24 NOTE — ED NOTES
DATE:  11/24/2017   TIME OF RECEIPT FROM LAB:  1201  LAB TEST:  Troponin  LAB VALUE:  3.59  RESULTS GIVEN WITH READ-BACK TO (PROVIDER):  Chantal Lei MD  TIME LAB VALUE REPORTED TO PROVIDER:   1207

## 2017-11-24 NOTE — IP AVS SNAPSHOT
MRN:[id number]                      After Visit Summary   11/24/2017    Dennys Ward    MRN: [id number]           Thank you!     Thank you for choosing [facility] for your care. Our goal is always to provide you with excellent care. Hearing back from our patients is one way we can continue to improve our services. Please take a few minutes to complete the written survey that you may receive in the mail after you visit with us. Thank you!        Patient Information     Date Of Birth          2/9/1928        Designated Caregiver       Most Recent Value    Caregiver    Will someone help with your care after discharge? no      About your hospital stay     You were admitted on:  November 24, 2017 You last received care in the:  [facility]    You were discharged on:  December 4, 2017        Reason for your hospital stay       New diagnosis of recent heart attack and end-stage congestive heart failure. We had numerous conversations regarding your goals of care, and you have chosen to continue medical treatments, including trial of rehab                  Who to Call     For medical emergencies, please call 911.  For non-urgent questions about your medical care, please call your primary care provider or clinic, [phone]          Attending Provider     Provider Specialty    Chantal Lei MD Emergency Medicine    [facility] Medicine       Primary Care Provider Office Phone # Fax #    Jona Ortiz [phone]      After Care Instructions     Activity - Up with nursing assistance           Advance Diet as Tolerated       Follow this diet upon discharge: 2 gram sodium diet            Daily weights       Call Provider for weight gain of more than 2 pounds per day or 5 pounds per week.            Fall precautions           General info for SNF       Length of Stay Estimate: Short Term Care: Estimated # of Days <30  Condition  at Discharge: Stable  Level of care:skilled   Rehabilitation Potential: Fair  Admission H&P remains valid and up-to-date: Yes  Recent Chemotherapy: N/A  Use Nursing Home Standing Orders: Yes            Glucose monitor nursing POCT       Before meals and at bedtime            Intake and output       Every shift            Mantoux instructions       Give two-step Mantoux (PPD) Per Facility Policy Yes                  Follow-up Appointments     Follow Up and recommended labs and tests       Follow up with senior living physician.  The following labs/tests are recommended: INR on 12/5, BMP within 1 week    Follow up with Cardiology as scheduled                  Your next 10 appointments already scheduled     Dec 06, 2017 10:20 AM CST   LAB with [facility]   [facility] [place] ([facility])    [facility] [id number]  [facility] [id number] [phone]           Please do not eat 10-12 hours before your appointment if you are coming in fasting for labs on lipids, cholesterol, or glucose (sugar). This does not apply to pregnant women. Water, hot tea and black coffee (with nothing added) are okay. Do not drink other fluids, diet soda or chew gum.            Dec 06, 2017 11:15 AM CST   Return Visit with Gerda Sam DO   [facility] ([facility])    [id number]  [facility] [address] [phone]            Dec 22, 2017  1:10 PM CST   CORE Enrollment with Marleny Blanton PA-C   [facility] ([facility])    [address] [id number]  [facility] [address] [phone]              Additional Services     Occupational Therapy Adult Consult       Evaluate and treat as clinically indicated.    Reason:  Generalized weakness, advanced CHF            Physical Therapy Adult Consult       Evaluate and treat as clinically indicated.    Reason:   "Generalized weakness, advanced CHF                  Future tests that were ordered for you     Basic metabolic panel                 Warfarin Instruction     You have started taking a medicine called warfarin. This is a blood-thinning medicine (anticoagulant). It helps prevent and treat blood clots.      Before leaving the hospital, make sure you know how much to take and how long to take it.      You will need regular blood tests to make sure your blood is clotting safely. It is very important to see your doctor for regular blood tests.    Talk to your doctor before taking any new medicine (this includes over-the-counter drugs and herbal products). Many medicines can interact with warfarin. This may cause more bleeding or too much clotting.     Eating a lot of vitamin K--found in green, leafy vegetables--can change the way warfarin works in your body. Do NOT avoid these foods. Instead, try to eat the same amount each day.     Bleeding is the most common side-effect of warfarin. You may notice bleeding gums, a bloody nose, bruises and bleeding longer when you cut yourself. See a doctor at once if:   o You cough up blood  o You find blood in your stool (poop)  o You have a deep cut, or a cut that bleeds longer than 10 minutes   o You have a bad cut, hard fall, accident or hit your head (go to urgent care or the emergency room).    For women who can get pregnant: This medicine can harm an unborn baby. Be very careful not to get pregnant while taking this medicine. If you think you might be pregnant, call your doctor right away.    For more information, read \"Guide to Warfarin Therapy,  the booklet you received in the hospital.        General Recommendations To Control Heart Failure When You Get Home       Heart Failure Instructions for Patients and Families: Please read and check off each of these important instructions as you do them when you get home.          Weight and Symptoms       ___ Put a scale in your " bathroom.    ___ Post a weight chart or calendar next to your scale.    ___ Weigh yourself everyday as soon as you get up in the morning (before breakfast).  You should only be wearing your pajamas.  Write your weight on the chart/calendar.  ___ Bring your weight chart/calendar with you to all appointments.  ___ Call your doctor or nurse practitioner if you gain 2 pounds (in 1 day) or 5 pounds in (1 week) from your goal  good  weight.  Your good weight is also called your  dry  weight.  Your doctor or nurse will tell you what your good weight should be.    ___ Call your doctor or nurse practitioner if you have shortness of breath that gets worse over time, leg swelling or fatigue.       Medications and Diet       ___ Make sure to take your medication as prescribed.    ___ Bring a current list of your medication and all of your medicine bottles with you to all appointments.    ___ Limit fluids if you still have swelling or shortness of breath, or if your doctor tells you to do so.    ___ Eat less than 2000 mg of sodium (salt) every day. Read food labels, and do not add salt to meals. Remember, if you eat less salt you retain less fluid.  ___ Follow a heart healthy diet that is low in saturated fat.        Activity and Suggested Lifestyle Changes      ___ Stay active. Talk to your doctor about an exercise program that is safe for your heart.  ___ Stop smoking. Reduce alcohol use.      ___ Lose weight if you are overweight. Extra weight puts a lot of stress on the heart.                 Control for Leg Swelling     ___ Keep your legs elevated (raised) as needed for swelling. If swelling is uncomfortable or elevation doesn t help, ask your doctor about using ACE wraps or support stockings.        What is the [facility] Clinic?  Cardiomyopathy  Optimization  Rehabilitation  Education    The [facility] Clinic is a heart failure specialty clinic within [facility].  It is an outpatient disease management program that  is based on a phase-by-phase approach, which is tailored to each patient s individual needs.  The cardiologist, nurse practitioner, physician assistant and nurses provide an ongoing outpatient care and treatment plan that guides heart failure and cardiomyopathy patients from evaluation and education to stabilization. This team works with your current primary care doctor and cardiologist to help you:    - Avoid hospitalizations  - Slow the progression of the disease  - Improve length and quality of life  - Know who and when to call if heart failure symptoms appear  - Receive easy access to quality health care and advice  - Better understand your condition and treatment  - Decrease the tremendous cost burden of heart failure care  - Detect future heart problems before they become life threatening                                 Follow-up Appointments     ___ An appointment with the [facility] may already have been made for you (see section   Your next appointments already scheduled ).  If you have a question about your appointment, would like to speak with a C.O.R.E. nurse, or would like to become a [facility] Clinic patient, please call one of the following locations:     - [facility]):                                             [phone]  - [facility]):                                            [phone]  - [facility]):                 [phone]      ___ Your C.O.R.E. Clinic Team will continue to educate you on your heart failure and may adjust medications based on your vital signs, lab work, and how you are feeling.  Therefore, it is very important to bring the following to all C.O.R.E. appointments:    - An accurate list of your medications  - Your medicine bottles  - Your weight chart/calendar                   Pending Results     No orders found from 11/22/2017 to 11/25/2017.            Statement of Approval   "   Ordered          17 1125  I have reviewed and agree with all the recommendations and orders detailed in this document.  EFFECTIVE NOW     Approved and electronically signed by:  Jocelyne Acosta MD           17 0928  I have reviewed and agree with all the recommendations and orders detailed in this document.  EFFECTIVE NOW     Approved and electronically signed by:  Jocelyne cAosta MD             Admission Information     Date & Time Provider Department Dept. Phone    2017 Guille Gasca MD [facility] [phone]      Your Vitals Were     Blood Pressure Pulse Temperature Respirations Height Weight    112/69 (BP Location: Left arm) 87 97.3  F (36.3  C) (Oral) 18 1.727 m (5' 8\") 79 kg (174 lb 3.2 oz)    Pulse Oximetry BMI (Body Mass Index)                94% 26.49 kg/m2          MyCharUpOut Information     [employer] lets you send messages to your doctor, view your test results, renew your prescriptions, schedule appointments and more. To sign up, go to [email or web address] . Click on \"Log in\" on the left side of the screen, which will take you to the Welcome page. Then click on \"Sign up Now\" on the right side of the page.     You will be asked to enter the access code listed below, as well as some personal information. Please follow the directions to create your username and password.     Your access code is: [email or web address]  Expires: 2018 10:57 AM     Your access code will  in 90 days. If you need help or a new code, please call your [place] clinic or [phone].        Care EveryWhere ID     This is your Care EveryWhere ID. This could be used by other organizations to access your [place] medical records  [id number]        Equal Access to Services     LEIDY VALLECILLO : Boy Castro, waaxda luabbieadaha, qaybta kaalmada yon, estuardo huynh. So [facility] [phone].    ATENCIÓN: Si tommy yanez, " tiene a washburn disposición servicios gratuitos de asistencia lingüística. Liz ryan [phone].    We comply with applicable federal civil rights laws and [place] laws. We do not discriminate on the basis of race, color, national origin, age, disability, sex, sexual orientation, or gender identity.               Review of your medicines      START taking        Dose / Directions    fluticasone 50 MCG/ACT spray   Commonly known as:  FLONASE   Used for:  Dyspepsia        Dose:  1 spray   Start taking on:  12/5/2017   Spray 1 spray into both nostrils daily   Quantity:  1 Bottle   Refills:  0       hydrALAZINE 10 MG tablet   Commonly known as:  APRESOLINE   Used for:  Acute systolic congestive heart failure (H)        Dose:  20 mg   Take 2 tablets (20 mg) by mouth 3 times daily   Quantity:  60 tablet   Refills:  0       insulin aspart 100 UNIT/ML injection   Commonly known as:  NovoLOG PEN   Used for:  Type 1 diabetes mellitus with diabetic polyneuropathy (H)        Dose:  1-7 Units   Inject 1-7 Units Subcutaneous 3 times daily (before meals) 1 unit for every 20 grams of carbohydrate   Refills:  0       insulin glargine 100 UNIT/ML injection   Commonly known as:  LANTUS   Used for:  Type 1 diabetes mellitus with diabetic polyneuropathy (H)        Dose:  5 Units   Inject 5 Units Subcutaneous daily   Refills:  0       isosorbide dinitrate 10 MG tablet   Commonly known as:  ISORDIL   Used for:  Acute systolic congestive heart failure (H)        Dose:  10 mg   Take 1 tablet (10 mg) by mouth 3 times daily   Refills:  0       nitroGLYcerin 0.4 MG sublingual tablet   Commonly known as:  NITROSTAT        For chest pain place 1 tablet under the tongue every 5 minutes for 3 doses. If symptoms persist 5 minutes after 1st dose call 911.   Quantity:  25 tablet   Refills:  0       ranitidine 150 MG tablet   Commonly known as:  ZANTAC   Used for:  Dyspepsia        Dose:  150 mg   Take 1 tablet (150 mg) by mouth At Bedtime   Quantity:   "60 tablet   Refills:  0         CONTINUE these medicines which may have CHANGED, or have new prescriptions. If we are uncertain of the size of tablets/capsules you have at home, strength may be listed as something that might have changed.        Dose / Directions    warfarin 2 MG tablet   Commonly known as:  COUMADIN   This may have changed:    - medication strength  - how much to take  - when to take this   Used for:  Chronic atrial fibrillation (H)        Dose:  2 mg   Start taking on:  12/6/2017   Take 1 tablet (2 mg) by mouth daily - For history of DVT INR Goal 2-3    7.5 mg on Monday, Wednesday and Friday 5 mg on all other days   Quantity:  30 tablet   Refills:  0         CONTINUE these medicines which have NOT CHANGED        Dose / Directions    * Insulin Syringe/Needle 28G X 1/2\" 1 ML Misc        As directed. For administering insulin at home.   Refills:  0       * BD insulin syringe ULTRAFINE 30G X 1/2\" 0.5 ML   Generic drug:  insulin syringe-needle U-100        FOR ADMINISTERING INSULIN AT HOME (1 TIME FOR NPH & 3 TIMES FOR R INSULIN)   Refills:  0       clopidogrel 75 MG tablet   Commonly known as:  PLAVIX        Dose:  75 mg   Take 75 mg by mouth daily   Refills:  0       furosemide 20 MG tablet   Commonly known as:  LASIX        Dose:  20 mg   Take 20 mg by mouth daily   Refills:  0       MAGNESIUM-ZINC PO        Magnesium Zinc pill once daily at night   Refills:  0       ONETOUCH ULTRA test strip   Generic drug:  blood glucose monitoring        TEST 4 TIMES DAILY   Refills:  0       PAIN & FEVER 325 MG tablet   Generic drug:  acetaminophen        Dose:  325 mg   Take 325 mg by mouth every 4 hours as needed   Refills:  0       vitamin D3 2000 UNITS Caps        Dose:  4000 Units   Take 4,000 Units by mouth daily   Refills:  0       * Notice:  This list has 2 medication(s) that are the same as other medications prescribed for you. Read the directions carefully, and ask your doctor or other care provider to " "review them with you.      STOP taking     HUMULIN N SC           insulin  UNIT/ML injection   Commonly known as:  HumuLIN N/NovoLIN N           insulin regular 100 UNIT/ML injection   Commonly known as:  HumuLIN R/NovoLIN R           losartan 100 MG tablet   Commonly known as:  COZAAR                Where to get your medicines      Some of these will need a paper prescription and others can be bought over the counter. Ask your nurse if you have questions.     You don't need a prescription for these medications     fluticasone 50 MCG/ACT spray    hydrALAZINE 10 MG tablet    insulin aspart 100 UNIT/ML injection    insulin glargine 100 UNIT/ML injection    isosorbide dinitrate 10 MG tablet    nitroGLYcerin 0.4 MG sublingual tablet    ranitidine 150 MG tablet    warfarin 2 MG tablet                Protect others around you: Learn how to safely use, store and throw away your medicines at www.disposemymeds.org.             Medication List: This is a list of all your medications and when to take them. Check marks below indicate your daily home schedule. Keep this list as a reference.      Medications           Morning Afternoon Evening Bedtime As Needed    * Insulin Syringe/Needle 28G X 1/2\" 1 ML Misc   As directed. For administering insulin at home.                                * BD insulin syringe ULTRAFINE 30G X 1/2\" 0.5 ML   FOR ADMINISTERING INSULIN AT HOME (1 TIME FOR NPH & 3 TIMES FOR R INSULIN)   Generic drug:  insulin syringe-needle U-100                                clopidogrel 75 MG tablet   Commonly known as:  PLAVIX   Take 75 mg by mouth daily   Last time this was given:  75 mg on 12/4/2017  9:22 AM                                fluticasone 50 MCG/ACT spray   Commonly known as:  FLONASE   Spray 1 spray into both nostrils daily   Start taking on:  12/5/2017   Last time this was given:  1 spray on 12/3/2017  9:19 AM                                furosemide 20 MG tablet   Commonly known as:  LASIX "   Take 20 mg by mouth daily                                hydrALAZINE 10 MG tablet   Commonly known as:  APRESOLINE   Take 2 tablets (20 mg) by mouth 3 times daily   Last time this was given:  20 mg on 12/4/2017  6:15 AM                                insulin aspart 100 UNIT/ML injection   Commonly known as:  NovoLOG PEN   Inject 1-7 Units Subcutaneous 3 times daily (before meals) 1 unit for every 20 grams of carbohydrate   Last time this was given:  4 Units on 12/1/2017  6:21 PM                                insulin glargine 100 UNIT/ML injection   Commonly known as:  LANTUS   Inject 5 Units Subcutaneous daily   Last time this was given:  5 Units on 12/4/2017 12:14 PM                                isosorbide dinitrate 10 MG tablet   Commonly known as:  ISORDIL   Take 1 tablet (10 mg) by mouth 3 times daily   Last time this was given:  10 mg on 12/4/2017  9:22 AM                                MAGNESIUM-ZINC PO   Magnesium Zinc pill once daily at night                                nitroGLYcerin 0.4 MG sublingual tablet   Commonly known as:  NITROSTAT   For chest pain place 1 tablet under the tongue every 5 minutes for 3 doses. If symptoms persist 5 minutes after 1st dose call 911.                                ONETOUCH ULTRA test strip   TEST 4 TIMES DAILY   Generic drug:  blood glucose monitoring                                PAIN & FEVER 325 MG tablet   Take 325 mg by mouth every 4 hours as needed   Generic drug:  acetaminophen                                ranitidine 150 MG tablet   Commonly known as:  ZANTAC   Take 1 tablet (150 mg) by mouth At Bedtime   Last time this was given:  150 mg on 12/3/2017  9:28 PM                                vitamin D3 2000 UNITS Caps   Take 4,000 Units by mouth daily                                warfarin 2 MG tablet   Commonly known as:  COUMADIN   Take 1 tablet (2 mg) by mouth daily - For history of DVT INR Goal 2-3    7.5 mg on Monday, Wednesday and Friday 5 mg on all  other days   Start taking on:  12/6/2017   Last time this was given:  1 mg on 12/3/2017  6:33 PM                                * Notice:  This list has 2 medication(s) that are the same as other medications prescribed for you. Read the directions carefully, and ask your doctor or other care provider to review them with you.

## 2017-11-24 NOTE — ED NOTES
DATE:  11/24/2017   TIME OF RECEIPT FROM LAB:  11:55 AM  LAB TEST:  INR  LAB VALUE:  6.51  RESULTS GIVEN WITH READ-BACK TO (PROVIDER):  Chantal Lei MD  TIME LAB VALUE REPORTED TO PROVIDER:   11:55 AM

## 2017-11-24 NOTE — H&P
PRIMARY CARE PHYSICIAN:  None.      CHIEF COMPLAINT:  Chest heaviness, shortness of breath, dyspnea on exertion, malaise.      HISTORY OF PRESENT ILLNESS:  History was reviewed from the patient and his son present by the bedside.  Mr. Dennys Ward is a pleasant 89-year-old male with past medical history as listed below, notably for diabetes, hypertension, atrial fibrillation, on chronic anticoagulation, who was brought to the ER today with vague symptoms.  He states [facility] and has not been very active, leading to some lower back discomfort, but over the past several months he has also noted some dyspnea on exertion which has been getting progressively worse.  He does report bilateral leg swelling and for past few days he has been having some subjective fever, worsened dyspnea on exertion, some mild chills and he has been having some URI symptoms with cough and cold.  His blood pressure today apparently was noted in systolic 60s at the nursing home.  With these concerns, he was brought to the ER and was seen by Dr. Lei.  Here, he was noted to be slightly tachycardic with soft blood pressure and labs were significantly abnormal for worsened renal failure, elevated lactate, elevated troponin, markedly elevated BNP, INR supratherapeutic.  A bedside echo was noted with a drop in EF to 20%. Dr. Lei talked to Dr. Sam from Cardiology and hospitalist requested admission for further evaluation.  With initial concern for sepsis, he was also given Zosyn and 500 mL normal saline bolus.  With fluid bolus, it seems that he became slightly hypoxic.  He denies any pain in the abdomen.  Reports normal bowel and bladder habit.      REVIEW OF SYSTEMS:  A 10-point review of systems was done and was negative, apart from those mentioned in the history of present illness.      PAST MEDICAL HISTORY:   1.  Type 1 diabetes mellitus with diabetic neuropathy.   2.   "Hypertension.     3.  Bilateral edema, no established diagnosis of CHF.   4.  Atrial fibrillation and history of deep venous thrombosis, on chronic anticoagulation.   5.  History of cerebrovascular accident with no residual neurological deficits.   6.  Chronic kidney disease stage III, baseline creatinine around 1.5.      MEDICATIONS PRIOR TO ADMISSION:  Prescriptions Prior to Admission   Medication Sig Dispense Refill Last Dose     acetaminophen (PAIN & FEVER) 325 MG tablet Take 325 mg by mouth every 4 hours as needed    at PRN     insulin syringe-needle U-100 (BD INSULIN SYRINGE ULTRAFINE) 30G X 1/2\" 0.5 ML FOR ADMINISTERING INSULIN AT HOME (1 TIME FOR NPH & 3 TIMES FOR R INSULIN)        Cholecalciferol (VITAMIN D3) 2000 UNITS CAPS Take 4,000 Units by mouth daily   11/24/2017 at Unknown time     clopidogrel (PLAVIX) 75 MG tablet Take 75 mg by mouth daily   11/24/2017 at AM     furosemide (LASIX) 20 MG tablet Take 20 mg by mouth daily   11/24/2017 at AM     insulin regular (HUMULIN R/NOVOLIN R) 100 UNIT/ML injection INJECT SUBCUTANEOUSLY 3 TIMES DAILY BEFORE MEALS PER SLIDING SCALE UP TO 94 UNITS PER DAY >>> For  to 160 inject 2 units      For  to 200 inject 3 units    For  to 240 inject 4 units     For  to 300 inject 5 units     For  or greater inject 6 units        Insulin Syringe/Needle 28G X 1/2\" 1 ML MISC As directed. For administering insulin at home.        losartan (COZAAR) 100 MG tablet Take 100 mg by mouth daily   11/24/2017 at AM     MAGNESIUM-ZINC PO Magnesium Zinc pill once daily at night   11/24/2017 at AM     warfarin (COUMADIN) 5 MG tablet Take by mouth every evening -  For history of DVT  INR Goal 2-3     7.5 mg on Monday, Wednesday and Friday  5 mg on all other days   11/23/2017 at PM     blood glucose monitoring (ONETOUCH ULTRA) test strip TEST 4 TIMES DAILY        Insulin NPH Human, Isophane, (HUMULIN N SC) Inject 15 Units Subcutaneous every morning (before " breakfast)   11/24/2017 at AM     insulin NPH (HUMULIN N/NOVOLIN N) 100 UNIT/ML injection Inject 13 Units Subcutaneous daily (with dinner)   11/23/2017 at PM         ALLERGIES:  Aspirin causing rash and also lisinopril causing diarrhea.      SOCIAL HISTORY:  He quit smoking and alcohol about 40 years ago.  Denies illicit drug use.      FAMILY HISTORY:  Reviewed and not pertinent to current presentation.      PHYSICAL EXAMINATION:   GENERAL:  The patient is conscious, alert, oriented x3, lying in bed and does not appear in apparent distress.  His head is elevated.   VITAL SIGNS:  Temperature 96 degrees Fahrenheit, heart rate of 103, blood pressure 103/61, saturation 99% on 2 L.   HEENT:  Pupils are equal and reactive to light and accommodation.  Extraocular movements are intact.  Oral mucosa is moist.   NECK:  Supple, no raised JVD.   RESPIRATORY:  He does have bilateral crepitations up to the mid lung.  No significant wheezing.   CARDIOVASCULAR:  Normal S1, S2, irregularly irregular rhythm, tachycardic, no murmur.   ABDOMEN:  Soft, nontender, nondistended.  No guarding, rigidity or rebound tenderness.   LOWER EXTREMITIES:  With mild bilateral edema present.   NEUROLOGIC:  No focal neurological deficits noted.  Cranial nerves II-XII grossly intact.   PSYCHIATRIC:  Normal mood and affect.      LABORATORY AND IMAGING:  Reviewed in Epic.  Notable for potassium of 5.5, BUN 70, creatinine of 2.24.  Lactic of 5.4, BNP elevated at 35,000.  Troponin I elevated at 3.604.  Glucose 272.  CBC with WBC of 9.3, hemoglobin 11.7, platelets 278.  INR 6.51.  Influenza negative.  Blood cultures are pending.  Chest x-ray shows small bibasilar pleural effusion with some infiltrate in the left lung base.  Chest x-ray reviewed by myself.  Echo reviewed, shows EF of 20% with severe global hypokinesis of the left ventricle with minor regional variation.  EKG reviewed by me shows atrial fibrillation with RVR with Q waves in inferolateral  leads.  No prior EKGs to compare with.      ASSESSMENT AND PLAN:  Mr. Dennys Ward is an 89-year-old male with past medical history significant for diabetes, hypertension, edema, atrial fibrillation, CVA, CKD, chronic anticoagulation who was brought to the ER today for vague symptoms of dyspnea on exertion, malaise, chills.     1.  Likely non-ST-elevation myocardial infarction.  We will admit him as inpatient.  Troponin is markedly elevated; however, he denies acute chest symptoms at this time, apart from dyspnea on exertion.  EKG shows some Q waves in inferolateral leads.  Will monitor him on telemetry.  I discussed this with Dr. Sam. Given his supratherapeutic INR we will hold off on heparin drip.  The plan is to let the INR drift down and resume heparin drip when INR is below 2.  Echo as noted above.  Further management will be per Cardiology.  Will have nitroglycerin p.r.n.  He is allergic to aspirin.  We will continue with his PTA Plavix.     2.  Probable sepsis, probable cardiogenic shock.  He presented with tachycardia, soft blood pressure and lactate of 5.4.  There is some concern for left lung base infiltrate and recent upper respiratory infection symptoms, so will continue with empiric Zosyn for now that was initiated in the ER; however, this all could be just related to profound hypotension upon presentation related to non-ST-elevation MI and low EF.  Will add on a procalcitonin.  If the procalcitonin is not suggestive of infection and if he remains afebrile with negative blood culture, we might be able to discontinue the Zosyn.  Will check a UA.     3.  Acute on chronic renal failure, likely exacerbated by hypotension related either to non-STEMI or probable sepsis.  We will hold off on his losartan, avoiding IV fluids because of low EF and fluid overload status.  Will monitor BMP.     4.  New-onset congestive heart failure with fluid overload.  Echo as noted shows EF of 30% which is a drop from EF  of 55% from echo in 2016.  Will continue with his PTA Lasix.  Will try to avoid IV Lasix at this time until his blood pressure improves.  He does not seem to be in respiratory distress at this time.     5.  Diabetes mellitus.  Will check HbA1c.  Will continue with insulin NPH and sliding scale insulin.     6.  Hypertension.  Hold off on the losartan because of hypotension.     7.  Atrial fibrillation and history of deep venous thrombosis, on chronic anticoagulation.  INR is supratherapeutic.  Holding off on the Coumadin because of supratherapeutic INR.  Plan is to start a heparin drip when INR drops less than 2.   8.  History of cerebrovascular accident with no residual neurological deficits.   9.  Deep venous thrombosis prophylaxis.  He is currently supratherapeutic on his INR.  Plan for anticoagulation as noted above.      CODE STATUS:  This was discussed and he wishes to be full code.         LATOSHA BERRY MD             D: 2017 14:03   T: 2017 14:48   MT: SK      Name:     LEON RAMIREZ   MRN:      [id number]        Account:      [id number]   :      1928           Admitted:     [id number]      Document: [id number]

## 2017-11-24 NOTE — ED NOTES
[facility]  ED Nurse Handoff Report    ED Chief complaint: Shortness of Breath (had to sit at the edge of the bed last night because he was having trouble breathing. Then had a belching episode)      ED Diagnosis:   Final diagnoses:   Cardiogenic shock (H)       Code Status: Full Code    Allergies:   Allergies   Allergen Reactions     Lisinopril Diarrhea     No Clinical Screening - See Comments Swelling     Aspirin Rash       Activity level - Baseline/Home:  Independent    Activity Level - Current:   Stand with Assist- d/t dyspnea and back pain     Needed?: No    Isolation: No  Infection: Not Applicable    Bariatric?: No    Vital Signs:   Vitals:    11/24/17 1120 11/24/17 1130 11/24/17 1145 11/24/17 1220   BP: 97/65 97/61 101/60 99/71   Pulse:       Resp:   16 19   Temp:       TempSrc:       SpO2: 98% 96% 96%    Weight:       Height:           Cardiac Rhythm: ,        Pain level: 0-10 Pain Scale: 0    Is this patient confused?: No    Patient Report: Initial Complaint: Pt with hx of Afib, DM type 1, and CVA and hx of pancreatectomy presents to the ED with increasing SOB. Pt reports that he burps and then has coughing with SOB. Pt has been sitting on the side of the bed to catch his breath a few times a day. Denies CP while in the ED. Pt placed on 2L NC after sats decrease to 90%. Initial BP in the dept is 60/40, but pt stablizes to low 100s and 90s systolic. BP begins to decrease around 1400 and pt reports some chest fullness/shallow breathing. No distress noted.   Focused Assessment: Breathing non-labored when laying on the cart. Dyspnea on exertion. Lung sounds cta. LLE noted. Skin w/d.   Tests Performed: labs, xray, ekg, echo  Abnormal Results: elevated trop, K, creat, BUN, INR, Lactic, BNP, pleural effusion with small infiltrate  Treatments provided: 300cc LR bolus, zosyn IV    Family Comments: son at bedside    OBS brochure/video discussed/provided to patient: N/A    ED Medications:    Medications   lactated ringers infusion (not administered)   lactated ringers BOLUS 2,340 mL (0 mLs Intravenous Stopped 11/24/17 1211)   piperacillin-tazobactam (ZOSYN) infusion 3.375 g (3.375 g Intravenous New Bag 11/24/17 1219)   sulfur hexafluoride microsphere (LUMASON) 60.7-25 MG injection 5 mL (5 mLs Intravenous Given 11/24/17 1246)   sodium chloride (PF) 0.9% PF flush 10 mL (10 mLs Intravenous Given 11/24/17 1245)       Drips infusing?:  Yes      ED NURSE PHONE NUMBER: [phone]

## 2017-11-25 NOTE — ANESTHESIA CARE TRANSFER NOTE
Patient: Dennys Ward    * No procedures listed *    Diagnosis: * No pre-op diagnosis entered *  Diagnosis Additional Information: No value filed.    Anesthesia Type:   No value filed.     Note:    Patient transferred to:Telemetry/Step Down Unit  Comments: Report to RN and transfer of care.Handoff Report: Identifed the Patient, Identified the Reponsible Provider, Reviewed the pertinent medical history, Discussed the surgical course, Reviewed Intra-OP anesthesia mangement and issues during anesthesia, Set expectations for post-procedure period and Allowed opportunity for questions and acknowledgement of understanding      Vitals: (Last set prior to Anesthesia Care Transfer)              Electronically Signed By: RUDY Allen CRNA  November 24, 2017  7:20 PM

## 2017-11-25 NOTE — PLAN OF CARE
Problem: Cardiac: ACS (Acute Coronary Syndrome) (Adult)  Goal: Signs and Symptoms of Listed Potential Problems Will be Absent, Minimized or Managed (Cardiac: ACS)  Signs and symptoms of listed potential problems will be absent, minimized or managed by discharge/transition of care (reference Cardiac: ACS (Acute Coronary Syndrome) (Adult) CPG).  Outcome: No Change  VSS. AAOx4. No c/o pain. Afib CVR 70's. Up w/ 1-assist. Pt tolerating 2L NC overnight. UO overnight = -575 mL after 1 dose of 20mg Lasix. Trop continue to trend up w/ latest of 7.2; Cardiology to see today. Renal U/s ordered. CTM.

## 2017-11-25 NOTE — PROVIDER NOTIFICATION
MD Notification    Notified Person:  MD    Notified Persons Name: Dr. Ward    Notification Date/Time: 11/25/17 at 0639    Notification Interaction:  Paged Physician    Purpose of Notification: Critical trop of 7.231    Orders Received:    Comments: trop trending up; cards consult today

## 2017-11-25 NOTE — PROGRESS NOTES
[facility]    Cardiology Progress Note    Date of Service (when I saw the patient): 11/25/2017    Assessment & Plan   Dennys Ward is a 89 year old male who was admitted on 11/24/2017.     #1 Newly discovered HFrEF (<20%)  #2 NSTEMI  #3 Atrial fibrillation with mild RVR  #4 MORIAH on CKD  #5 Supratherapeutic INR  #6 Type 1 DM  #7 Hx of stroke  #8 Hx of DVT    -Troponin began trending down; consider angiogram on Monday/Tuesday if creatinine/INR improving to help clarify cause of cardiomyopathy  -Continue Lasix and hydralazine  -Continue Plavix  -Plan to start Heparin when INR improves    DVT Prophylaxis: Warfarin  Code Status: Full Code    Disposition: Expected discharge in 3-4 days once stabilized.    Edy Argueta MD    Interval History   Trop went up to 7.2 but now coming down to 5.2. Cr elevated at 2.2. Lactate now normal. INR still elevated at 6.7. He feels well. No chest pain. Breathing is doing well.     -Data reviewed today: I reviewed all new labs and imaging results over the last 24 hours.     Physical Exam   Temp: 97.7  F (36.5  C) Temp src: Axillary BP: 101/67 Pulse: 97 Heart Rate: 73 Resp: 20 SpO2: 96 % O2 Device: Nasal cannula Oxygen Delivery: 2 LPM  Vitals:    11/24/17 1042 11/25/17 0600   Weight: 78 kg (172 lb) 76.3 kg (168 lb 4.8 oz)     Vital Signs with Ranges  Temp:  [96.9  F (36.1  C)-97.7  F (36.5  C)] 97.7  F (36.5  C)  Pulse:  [94-97] 97  Heart Rate:  [64-90] 73  Resp:  [9-25] 20  BP: ()/(50-88) 101/67  SpO2:  [95 %-99 %] 96 %  I/O last 3 completed shifts:  In: 1020 [P.O.:1020]  Out: 1400 [Urine:1400]    Constitutional: No apparent distress. Lying almost flat in bed. Non-labored breathing.   Eyes: No xanthelasma or conjunctivitis  Respiratory: Mostly clear. Few crackles.   Cardiovascular: Irregular, normal rate  Extremities: Trace peripheral edema.  Neurologic: Moving all extremities. No facial assymmetry.  Psychiatric: Alert and oriented. Answers questions  appropriately.     Medications     - MEDICATION INSTRUCTIONS -         [START ON 11/26/2017] insulin isophane human  12 Units Subcutaneous QAM AC     insulin isophane human  11 Units Subcutaneous Daily with supper     piperacillin-tazobactam  3.375 g Intravenous Q6H     clopidogrel  75 mg Oral Daily     insulin aspart  1-7 Units Subcutaneous TID AC     insulin aspart  1-5 Units Subcutaneous At Bedtime     sodium chloride (PF)  3 mL Intracatheter Q8H     hydrALAZINE  10 mg Oral Q8H RUBEN       Data     Recent Labs  Lab 11/25/17  1235 11/25/17  0545 11/24/17 2006 11/24/17  1054   WBC  --  9.0  --   --  9.3   HGB  --  10.5*  --   --  11.7*   MCV  --  91  --   --  92   PLT  --  239  --   --  278   INR  --  6.67*  --   --  6.51*   NA  --  136  --   --  134   POTASSIUM  --  5.1  --   --  5.5*   CHLORIDE  --  101  --   --  100   CO2  --  26  --   --  24   BUN  --  72*  --   --  70*   CR  --  2.20*  --   --  2.24*   ANIONGAP  --  9  --   --  10   BERE  --  8.4*  --   --  9.1   GLC  --  79  --   --  272*   ALBUMIN  --   --   --   --  3.6   PROTTOTAL  --   --   --   --  7.8   BILITOTAL  --   --   --   --  0.9   ALKPHOS  --   --   --   --  115   ALT  --   --   --   --  33   AST  --   --   --   --  41   TROPI 5.189* 7.231* 5.379*  < > 3.593*   < > = values in this interval not displayed.    Recent Results (from the past 24 hour(s))   US Renal Complete    Narrative    RENAL ULTRASOUND   11/25/2017 8:51 AM     HISTORY: Acute renal failure.     COMPARISON: None.    FINDINGS:    Right Kidney: The right kidney measures 10.1 x 4.7 x 4.4 cm. Cortical  thickness is 1.1 cm. No renal masses are seen. There is no  hydronephrosis or renal calculus.     Left Kidney: The left kidney measures 9.8 x 4.4 x 4.8 cm. Cortical  thickness is 1.1 cm. No renal masses are seen. There is no  hydronephrosis or renal calculus.     The visualized portions of the urinary bladder appear normal.      Impression    IMPRESSION: Normal renal ultrasound.      BRENDA VALLE MD

## 2017-11-25 NOTE — PROGRESS NOTES
Pt admitted to ccu for SOB. Due for IV lasix and IVPB but IV leaking. Attempted IV start x3.  Anesthesia called to place IV. Pt on 2 liters NC with good O2 saturations. Denies SOB at this time.

## 2017-11-25 NOTE — CONSULTS
CARDIOLOGY CONSULTATION      DATE OF SERVICE:  11/24/2017       REQUESTING PHYSICIAN:  Not listed.      CONSULTING PHYSICIAN:  Dr. Gasca       REASON FOR REFERRAL:  Abnormal echocardiogram and elevated cardiac enzymes.      HISTORY OF PRESENT ILLNESS:  Mr. Dennys Ward is a very pleasant 89-year-old male with no prior history of coronary artery disease but with history of chronic atrial fibrillation and history of stroke, on Coumadin therapy, presents to the emergency room with increasing work of breathing.  In the last week he has had some difficulty with subjective fevers, coughing with sputum production, burping, belching discomfort and poor appetite.  He states his breathing difficulties began just a couple days ago, initially with exertion and then at rest as well as during sleep time, although I do note the hospitalist had elicited at a longer history of breathing difficulties.  He denied any chest pain symptoms.  He does have a history of diabetes, again chronic atrial fibrillation and is on Coumadin therapy.  He has a history of stroke and is also on Plavix for this.  He denies any changes to his urine quantity wise or color wise, he denies any dark urine, foul odor.  He does admit that he has difficulty with urinary hesitation and a feeling of residual.  This is a chronic issue for him.  He has not noticed any drop in the volume of urine on a daily basis.  His Coumadin level last was checked on 11/08.  He is scheduled every 3 weeks and it has been pretty stable.  On 11/08 it was 1.8.  He is on a system of every other day taking 5 mg and 7.5 mg alternating.  He has occasional nosebleeds from this, nothing severe and some minor bruising to his extremities with blood thinners.  He has not noted any hematochezia or black tarry looking stools.  He does not have any records in our [facility] system.  I did look through [facility].  It looks like his only cardiac test that I can find was last year around  this time.  He underwent an echocardiogram.  At that time his ejection fraction was low normal at 55%.  The apex was not moving well at that time.  There was no significant valvular heart disease noted.  He had a chest x-ray in February it looks like in the [facility] system and had a normal cardiac silhouette at that time.  Mr. Ward denies any medication changes in the last 3-4 weeks.  He has a system of taking his pills in the bottles themselves and does admit on occasion he may forget or accidentally take more than prescribed.  His son was able to talk to me as well.  He has noted some mild confusion but nothing significant and feels like for the most part he has been pretty compliant and reliable in taking his medications.  It is noted that they recently moved to a nursing home.  His son states that his wife takes care of the bills.  He is still able to drive and ambulates without difficulties and takes care of all of his activities of daily living.  He appears comfortable without any pain or discomfort or difficulty breathing.      PAST MEDICAL HISTORY:  Insulin-dependent diabetes, hypertension, history of lower extremity edema, chronic atrial fibrillation with history of DVTs, on chronic Coumadin therapy and history of CVA with addition of Plavix.  History of chronic kidney disease, baseline creatinine around 1.5.      ALLERGIES:  Please see H&P.      SOCIAL HISTORY:  Recently moved to a nursing home.  He still lives with his wife.  He has a very remote tobacco history.      FAMILY HISTORY:  Negative for premature coronary disease.      MEDICATIONS:  On admission include:   1.  Warfarin 5/7.5 alternating days.   2.  Plavix 75 mg daily.   3.  Lasix 20 mg daily.   4.  Insulin.    5.  Magnesium.    6.  Vitamin D.   7.  Multivitamin.   8.  Losartan 100 mg daily.      REVIEW OF SYSTEMS:  Once again he denies any chest pain symptoms.  Denies history of syncope or lightheadedness.  He does have some urinary hesitancy  but no changes to his urine volume or color.  No recent melena, hematochezia or bowel difficulties.  He has had poor oral intake, loss of appetite and some belching.  No nausea or vomiting.  No chest pains.  Breathing difficulties as above.      PHYSICAL EXAMINATION:   VITAL SIGNS:  Blood pressure is ranging anywhere from  systolic over 56-71 diastolic.  His heart rate is ranging between 60 and 103, averaging in the 70s however, respiratory rate 20s, oxygen is 100% on 2 liters per nasal cannula.  He is afebrile since admission.   GENERAL:  He is an elderly male.  He does not appear in any distress.   HEENT:  Head is atraumatic and normocephalic.  Pupils are equal and small.  Conjunctivae are clear.  Mucous membranes are moist.   NECK:  Supple.  I do not appreciate carotid bruit.  I do not see jugular venous distention.   CARDIOVASCULAR:  Tones are irregular.  I do not hear a murmur on exam.   LUNGS:  Demonstrate bibasilar crackles throughout.   ABDOMEN:  Soft, bowel sounds are active.   EXTREMITIES:  Evidence of varicose veins and hemosiderin deposition but no current peripheral edema.  Pulses are diminished in the lower extremities.   NEUROLOGIC:  He seems alert and oriented.  There are no gross focal neurologic abnormalities noted.      DIAGNOSTIC DATA:  Electrocardiogram on admission demonstrates atrial fibrillation with a slightly fast heart rate of 104 beats per minute.  Left axis deviation is noted and poor R-wave through the anterior precordial leads with an interventricular conduction delay and upsloping ST segments are elevated approximately 1 mm, mainly just noted in V2 and through V4.  There are no reciprocal changes noted.  I do not have the previous available for comparison.  His initial troponin came back at 3.6.  Metabolic derangements include a creatinine at 2.24, potassium of 5.5, lactic acid of 3.1.  Procalcitonin is normal.  NT proBNP is markedly elevated greater than 35,000.  He is mildly  anemic with a hemoglobin of 11.7 but white count and platelets are normal.  INR was supratherapeutic at 6.51.  Influenza negative.  Chest x-ray read by Radiology as small bibasilar pleural effusions and some minimal atelectasis or infiltrate in the left base.  An echocardiogram was completed and demonstrates severely reduced LV systolic function, EF of less than 20% with global hypokinesis.  The right ventricle is mild to moderately reduced.  There is moderate left atrial enlargement, mild to moderate mitral insufficiency.      ASSESSMENT AND PLAN:  In summary, Mr. Ward is a very pleasant 89-year-old male with a presentation of shortness of breath or respiratory distress with recent history of subjective fevers, cough and general malaise and some burping and belching symptoms with some acid reflux.  He has evidence of severe LV dysfunction with global hypokinesis, elevated cardiac enzymes and NT proBNP as well as an abnormal electrocardiogram.  There is no clearcut ischemic changes noted on EKG and I do not know if any of these changes are chronic without a comparison.  He appears in acute renal failure with hyperkalemia and a creatinine of 2.2, but with still good urine as well as a supratherapeutic INR level but with normal liver function tests.  At this time, the etiology of his cardiac dysfunction symptoms and supratherapeutic INR are unclear but I am suspicious of a medication overdosing with possible some forgetfulness and confusion.  He has not had any new medication or medication changes in the last 3 weeks since his last INR was checked in November.  With poor oral intake with his recent illness, this could be contributing in combination with Lasix and losartan to his renal insufficiency.  I would hold his losartan at this time, however, I would recommend IV Lasix to improve his urine volume and reduce his symptoms of respiratory distress as he is demonstrating evidence of systolic congestive heart  failure.  If he does not respond or with worsening creatinine, we may need to consider Nephrology consult.  I would recommend imaging his kidneys to see if he has any evidence of urinary obstruction given the history of urinary hesitancy and residual volume sensation.  It is possible he has severe ischemic heart disease given that he is an insulin-dependent diabetic but unfortunately with his coags and acute renal insufficiency, this makes assessment and evaluation of this very difficult at this time.  I will recommend to follow troponin levels and the pattern will hopefully give us some clue with etiology.  It is also possible that this may be a stress cardiomyopathy given the global dysfunction.  I would avoid beta blockers right now as he is beta blocker na?ve or he has not been on beta blockers in the acute systolic congestive heart phase.  Also, I note that his heart rate is not that fast in atrial fibrillation which suggests the possibility of some AV alma disease as well; however, if he is able to tolerate any afterload reduction with hydralazine, I would consider adding this if his blood pressure tolerates.  For now I will focus on gentle diuresis and afterload reduction for treatment of his acute heart failure and consider ischemic evaluation if his metabolic derangements improve, either with coronary angiogram versus possible Lexiscan on Monday to start with.        We are happy to follow along in his care.  Please feel free to contact me with any questions you have in regard to his care.         MATTHEW LEE DO             D: 2017 16:30   T: 2017 09:48   MT: QUE      Name:     LEON RAMIREZ   MRN:      [id number]        Account:       [id number]   :      1928           Consult Date:  2017      Document: [id number]       cc: Guille Gacsa MD

## 2017-11-25 NOTE — PROGRESS NOTES
[facility]    Hospitalist Progress Note    Date of Service (when I saw the patient): 11/25/2017    Assessment & Plan   Dennys Ward is an 89 year-old male with past medical history significant for diabetes mellitus type 1 with neuropathy, hypertension, atrial fibrillation, chronic lower extremity edema, CVA, CKD, history of DVT who was brought to the ED for ht to the ER today for chest heaviness, dyspnea on exertion, subjective fevers/chills. Found to have elevated lactic acid, elevated troponin, new cardiomyopathy. Admitted 11/24/17.    NSTEMI  Presented with dyspnea on exertion, chest heaviness. Initial troponin 3.593. Echocardiogram in ED with EF <20% with severe global hypokinesis and minor regional variation. EKG on admission with Q waves in inferior leads. No previous EKG available for review.   - Denies any chest pain  - Trend troponin to peak   - Heparin drip not started on admission due to elevated INR, plan to start once INR < 2   - Continue clopidogrel (aspirin allergy)  - Cardiology consulted, appreciate assistance    Cardiogenic shock  Acute systolic congestive heart failure with exacerbation  Cardiomyopathy, unspecified type, new diagnosis  Mild to moderate mitral regurgitation   Echocardiogram on admission with EF <20% with severe global hypokinesis and minor regional variation, no severe valvular pathology. Last echocardiogram 11/2016 with EF 55%, no prior clinical diagnosis of CHF. Low EF associated with hypotension, elevated lactic acid, MORIAH on arrival to ED, concerning for cardiogenic shock. Per chart review, initially on room air, became hypoxic following IVF in ED. Concern for NSTEMI on admission as above, other ddx for cardiomyopathy includes stress cardiomyopathy, viral myocarditis given associated URI symptoms.  - Daily weights, strict I/O  - Losartan on hold given MORIAH  - Hydralazine for afterload reduction started per cardiology. Not yet on nitrates.   - Cardiology  following, appreciate assistance    Possible sepsis  Noted to be tachycardic, with borderline BP and report of more significant hypotension preceding admission, lactic acid 5.4. Reported URI-type symptoms on admission including subjective fevers/chills, cough. CXR with minimal atelectasis or infiltrate left lung base. Influenza A/B negative.   - UA mildly abnormal (WBC 12, moderate LE)  - Procalcitonin 0.11, low risk of systemic bacterial infection   - Continue piperacillin-tazobactam IV while awaiting further culture data  - Remains possible that his concerning admission vital signs and elevated lactic acid are cardiac rather than infectious in etiology    Acute kidney injury on chronic kidney disease stage III  Baseline creatinine variable, most recently running 1.2-1.6 (last 1.56 10/17/17). Creatinine 2.24 on admission. UA on admission relatively bland with WBC 12, RBC 3, 10 protein on midstream specimen. Suspect primarily related to hypoperfusion with medications including PTA losartan and furosemide; hypotension; cardiorenal physiology from new cardiomyopathy all potential contributors.   - Renal function stable following initial fluids, followed by diuresis   - Monitor I/O  - Renal US pending  - Monitor BMP  - Continue to hold losartan    Diabetes mellitus, type 1, with diabetic polyneuropathy   Prior to admission on NPH 13 units with dinner and 15 units in AM + SSI (regular) with meals  - HgbA1c 8.4% 11/24/17   - Fasting blood sugars trending down, intake has been decreased recently. Will reduce his PTA regimen slightly to 11 units with dinner and 12 units in AM, titrate up as blood sugars dictate  - Continue SSI    Hypertension  Hypotensive prior to admission. Prior to admission on losartan, furosemide.   - Blood pressures stable  - Losartan remains on hold due to MORIAH  - Has been started on hydralazine per cardiology for afterload reduction     Atrial fibrillation  On chronic warfarin. Not on  rate-controlling medications prior to admission.   - Rate controlled  - Warfarin on hold due to supratherapeutic INR  - Continue telemetry     History of deep venous thrombosis  Chronic lower extremity edema  On chronic warfarin. Reports chronic lower extremity edema since that time, with features suggestive of venous insufficiency including absence of edema in the morning that worsens through the day.  - Warfarin on hold due to supratherapeutic INR     History of cerebrovascular accident   No residual neurological deficits.   - Anticoagulation / antiplatelet plan as outlined above    DVT Prophylaxis: Currently supratherapeutic on warfarin   Code Status: Full Code    Disposition: Expected discharge unclear, pending completion of cardiac work-up, anticipate 3+ days    Murray Son    Interval History   No acute events overnight. Denies any chest pain. Denies any dyspnea on exertion with ambulation from bed to chair, has not exerted himself much further than that. Denies abdominal pain. Appetite remains lower from baseline. Overall feels he is improving compared to admission.     -Data reviewed today: I reviewed all new labs and imaging results over the last 24 hours. I personally reviewed no images or EKG's today.    Physical Exam   Temp: 97.7  F (36.5  C) Temp src: Axillary BP: 109/67 Pulse: 103 Heart Rate: 77 Resp: 11 SpO2: 98 % O2 Device: Nasal cannula Oxygen Delivery: 2 LPM  Vitals:    11/24/17 1042 11/25/17 0600   Weight: 78 kg (172 lb) 76.3 kg (168 lb 4.8 oz)     Vital Signs with Ranges  Temp:  [96  F (35.6  C)-97.7  F (36.5  C)] 97.7  F (36.5  C)  Pulse:  [103] 103  Heart Rate:  [] 77  Resp:  [9-25] 11  BP: ()/(50-88) 109/67  SpO2:  [63 %-100 %] 98 %  I/O last 3 completed shifts:  In: 600 [P.O.:600]  Out: 675 [Urine:675]    Constitutional: Well-appearing elderly male, NAD  Respiratory: Mildly diminished bilateral bases, no wheezes, normal effort at rest  Cardiovascular: RRR, no m/r/g. Trace  bilateral lower extremity edema L>R  GI: Soft, non-tender, non-distended.   Skin/Integumen: Warm, dry  Neuro: Alert. Responding to questions appropriately. Following commands.      Medications     - MEDICATION INSTRUCTIONS -         piperacillin-tazobactam  3.375 g Intravenous Q6H     clopidogrel  75 mg Oral Daily     insulin isophane human  13 Units Subcutaneous Daily with supper     insulin NPH  15 Units Subcutaneous QAM AC     insulin aspart  1-7 Units Subcutaneous TID AC     insulin aspart  1-5 Units Subcutaneous At Bedtime     sodium chloride (PF)  3 mL Intracatheter Q8H     hydrALAZINE  10 mg Oral Q8H [facility]       Data     Recent Labs  Lab 11/25/17  0545 11/24/17 2006 11/24/17  1550  11/24/17  1054   WBC 9.0  --   --   --  9.3   HGB 10.5*  --   --   --  11.7*   MCV 91  --   --   --  92     --   --   --  278   INR 6.67*  --   --   --  6.51*     --   --   --  134   POTASSIUM 5.1  --   --   --  5.5*   CHLORIDE 101  --   --   --  100   CO2 26  --   --   --  24   BUN 72*  --   --   --  70*   CR 2.20*  --   --   --  2.24*   ANIONGAP 9  --   --   --  10   BERE 8.4*  --   --   --  9.1   GLC 79  --   --   --  272*   ALBUMIN  --   --   --   --  3.6   PROTTOTAL  --   --   --   --  7.8   BILITOTAL  --   --   --   --  0.9   ALKPHOS  --   --   --   --  115   ALT  --   --   --   --  33   AST  --   --   --   --  41   TROPI 7.231* 5.379* 4.341*  < > 3.593*   < > = values in this interval not displayed.    Recent Results (from the past 24 hour(s))   XR Chest 2 Views    Narrative    XR CHEST 2 VW 11/24/2017 12:08 PM    HISTORY: Fever;       Impression    IMPRESSION: Small bibasilar pleural effusions with some minimal  atelectasis or infiltrate left lung base.     DEEDEE JOHN MD

## 2017-11-26 NOTE — PLAN OF CARE
Problem: Cardiac: ACS (Acute Coronary Syndrome) (Adult)  Goal: Signs and Symptoms of Listed Potential Problems Will be Absent, Minimized or Managed (Cardiac: ACS)  Signs and symptoms of listed potential problems will be absent, minimized or managed by discharge/transition of care (reference Cardiac: ACS (Acute Coronary Syndrome) (Adult) CPG).   Outcome: No Change  VSS. AAOx4. No c/o pain. Afib CVR 70's. Sat'ing well on RA; slightly FORRESTER. Up w/ 1-assist; unsteady gait. New onset diarrhea last night. Cardiology following and to determine if angio or Lexiscan is applicable to determine etiology of acute onset of cardiomyopathy. CTM.

## 2017-11-26 NOTE — PROGRESS NOTES
[facility]    Hospitalist Progress Note    Date of Service (when I saw the patient): 11/26/2017    Assessment & Plan   Dennys Ward is an 89 year-old male with past medical history significant for diabetes mellitus type 1 with neuropathy, hypertension, atrial fibrillation, chronic lower extremity edema, CVA, CKD, history of DVT who was brought to the ED for ht to the ER today for chest heaviness, dyspnea on exertion, subjective fevers/chills. Found to have elevated lactic acid, elevated troponin, new cardiomyopathy. Admitted 11/24/17.    NSTEMI  Presented with dyspnea on exertion, chest heaviness. Initial troponin 3.593. Echocardiogram in ED with EF <20% with severe global hypokinesis and minor regional variation. EKG on admission with Q waves in inferior leads. No previous EKG available for review.   - Continues to deny any chest pain  - Troponin peak at 7.231  - Heparin drip not started on admission due to elevated INR, plan to start once INR < 2   - Continue clopidogrel (aspirin allergy)  - Cardiology consulted, appreciate assistance  - Anticipate further ischemic work-up, ideally with angiogram, pending further improvement in renal function and INR     Cardiogenic shock  Acute systolic congestive heart failure with exacerbation  Cardiomyopathy, unspecified type, new diagnosis  Mild to moderate mitral regurgitation   Echocardiogram on admission with EF <20% with severe global hypokinesis and minor regional variation, no severe valvular pathology. Last echocardiogram 11/2016 with EF 55%, no prior clinical diagnosis of CHF. Low EF associated with hypotension, elevated lactic acid, MORIAH on arrival to ED, concerning for cardiogenic shock. Per chart review, initially on room air, became hypoxic following IVF in ED. Concern for NSTEMI on admission as above, other ddx for cardiomyopathy includes stress cardiomyopathy, viral myocarditis given associated URI symptoms.  - Daily weights, strict I/O  -  Losartan on hold given MORIAH  - On low dose hydralazine alone for afterload reduction, not yet on nitrates. His extremities remain slightly cool and his MAP is generally in the 80s so suspect his SVR remains high, plan to discuss with cardiology further optimization of his HF regimen, which may contribute to further improvement in renal function   - Re-ordered furosemide 20 mg IV BID   - Cardiology following, appreciate assistance    Possible sepsis  Noted to be tachycardic, with borderline BP and report of more significant hypotension preceding admission, lactic acid 5.4. Reported URI-type symptoms on admission including subjective fevers/chills, cough. CXR with minimal atelectasis or infiltrate left lung base. Influenza A/B negative. UA mildly abnormal (WBC 12, moderate LE). Procalcitonin 0.11, low risk of systemic bacterial infection   - Blood and urine cultures remain negative. Will discontinue piperacillin-tazobactam IV, especially with development of diarrhea  - Unless culture data suggests otherwise, or clinical decompensation with antibiotics discontinued, would suspect his concerning admission vital signs and elevated lactic acid are cardiac rather than infectious in etiology    Acute kidney injury on chronic kidney disease stage III  Baseline creatinine variable, most recently running 1.2-1.6 (last 1.56 10/17/17). Creatinine 2.24 on admission. UA on admission relatively bland with WBC 12, RBC 3, 10 protein on midstream specimen. Suspect primarily related to hypoperfusion with medications including PTA losartan and furosemide; hypotension; cardiorenal physiology from new cardiomyopathy all potential contributors.   - Renal function generally stable   - Renal US unremarkable.   - Continue to suspect poor forward flow as contributor, will discuss further optimization of his heart failure regimen with cardiology as above.   - Monitor I/O  - Monitor BMP  - Continue to hold losartan    Diabetes mellitus, type 1,  with diabetic polyneuropathy   Prior to admission on NPH 13 units with dinner and 15 units in AM + SSI (regular) with meals. HgbA1c 8.4% 11/24/17   - Mild hypoglycemia on fasting blood sugar 11/26/2017 despite previous insulin reductions. Evening blood sugar still borderline high. Will reduce bedtime NPH from 11 units to 9 units and continue same breakfast insulin dose.  - Continue SSI    Hypertension  Hypotensive prior to admission. Prior to admission on losartan, furosemide.   - Blood pressures stable  - Losartan remains on hold due to MORIAH  - Has been started on hydralazine per cardiology for afterload reduction    Atrial fibrillation  On chronic warfarin. Not on rate-controlling medications prior to admission.   - Rate controlled  - Warfarin on hold due to supratherapeutic INR with likely plan for angiogram   - Continue telemetry     History of deep venous thrombosis  Chronic lower extremity edema  On chronic warfarin. Reports chronic lower extremity edema since that time, with features suggestive of venous insufficiency including absence of edema in the morning that worsens through the day.  - Warfarin remains on hold due to supratherapeutic INR with likely plan for angiogram     History of cerebrovascular accident   No residual neurological deficits.   - Anticoagulation / antiplatelet plan as outlined above    Diarrhea  Suspect antibiotic-associated from piperacillin-tazobactam IV. No preceding antibiotic use.   - Check C difficile PCR if persists/worsening despite antibiotic discontinuation     DVT Prophylaxis: Currently supratherapeutic on warfarin   Code Status: Full Code    Disposition: Expected discharge unclear, pending completion of cardiac work-up (possible angiogram 11/27/17), anticipate 2+ days    Murray Son    Interval History   No acute events overnight. Had two episodes of watery diarrhea. Slept poorly overnight. Denies any chest pain. Continues to feel some shortness of breath with ambulation  to the bathroom, although overall improved.     -Data reviewed today: I reviewed all new labs and imaging results over the last 24 hours. I personally reviewed no images or EKG's today.    Physical Exam   Temp: 97.9  F (36.6  C) Temp src: Oral BP: 108/69 Pulse: 97 Heart Rate: 75 Resp: 20 SpO2: 92 % O2 Device: None (Room air)    Vitals:    11/24/17 1042 11/25/17 0600 11/26/17 0507   Weight: 78 kg (172 lb) 76.3 kg (168 lb 4.8 oz) 76.5 kg (168 lb 11.2 oz)     Vital Signs with Ranges  Temp:  [97.9  F (36.6  C)-98.4  F (36.9  C)] 97.9  F (36.6  C)  Pulse:  [94-97] 97  Heart Rate:  [70-77] 75  Resp:  [18-21] 20  BP: ()/(62-77) 108/69  SpO2:  [92 %-100 %] 92 %  I/O last 3 completed shifts:  In: 856 [P.O.:856]  Out: 1075 [Urine:1075]    Constitutional: Elderly male, NAD  Respiratory: Mild bibasilar crackles, no wheezes, normal effort at rest  Cardiovascular: Irregularly irregular, no m/r/g. No significant lower extremity edema.   GI: Soft, non-tender, non-distended.   Skin/Integumen: Dry, extremities slightly cool   Neuro: Alert. Responding to questions appropriately. Following commands.      Medications     - MEDICATION INSTRUCTIONS -         insulin isophane human  9 Units Subcutaneous Daily with supper     furosemide  20 mg Intravenous BID     insulin isophane human  12 Units Subcutaneous QAM AC     clopidogrel  75 mg Oral Daily     insulin aspart  1-7 Units Subcutaneous TID AC     insulin aspart  1-5 Units Subcutaneous At Bedtime     sodium chloride (PF)  3 mL Intracatheter Q8H     hydrALAZINE  10 mg Oral Q8H RUBEN       Data     Recent Labs  Lab 11/26/17  0512 11/25/17  2346 11/25/17  1822  11/25/17  0545  11/24/17  1054   WBC 10.5  --   --   --  9.0  --  9.3   HGB 11.6*  --   --   --  10.5*  --  11.7*   MCV 91  --   --   --  91  --  92     --   --   --  239  --  278   INR 3.69*  --   --   --  6.67*  --  6.51*     --   --   --  136  --  134   POTASSIUM 4.5  --   --   --  5.1  --  5.5*   CHLORIDE 101   --   --   --  101  --  100   CO2 26  --   --   --  26  --  24   BUN 63*  --   --   --  72*  --  70*   CR 2.33*  --   --   --  2.20*  --  2.24*   ANIONGAP 9  --   --   --  9  --  10   BERE 8.6  --   --   --  8.4*  --  9.1   GLC 63*  --   --   --  79  --  272*   ALBUMIN  --   --   --   --   --   --  3.6   PROTTOTAL  --   --   --   --   --   --  7.8   BILITOTAL  --   --   --   --   --   --  0.9   ALKPHOS  --   --   --   --   --   --  115   ALT  --   --   --   --   --   --  33   AST  --   --   --   --   --   --  41   TROPI 4.419* 4.134* 4.665*  < > 7.231*  < > 3.593*   < > = values in this interval not displayed.    Recent Results (from the past 24 hour(s))   US Renal Complete    Narrative    RENAL ULTRASOUND   11/25/2017 8:51 AM     HISTORY: Acute renal failure.     COMPARISON: None.    FINDINGS:    Right Kidney: The right kidney measures 10.1 x 4.7 x 4.4 cm. Cortical  thickness is 1.1 cm. No renal masses are seen. There is no  hydronephrosis or renal calculus.     Left Kidney: The left kidney measures 9.8 x 4.4 x 4.8 cm. Cortical  thickness is 1.1 cm. No renal masses are seen. There is no  hydronephrosis or renal calculus.     The visualized portions of the urinary bladder appear normal.      Impression    IMPRESSION: Normal renal ultrasound.     BRENDA VALLE MD

## 2017-11-26 NOTE — PROGRESS NOTES
[facility]    Cardiology Progress Note    Date of Service (when I saw the patient): 11/26/2017    Assessment & Plan   Dennys Ward is a 89 year old male who was admitted on 11/24/2017.      #1 Newly discovered HFrEF (<20%)  #2 NSTEMI  #3 Atrial fibrillation with mild RVR  #4 MORIAH on CKD  #5 Supratherapeutic INR  #6 Type 1 DM  #7 Hx of stroke  #8 Hx of DVT     -Troponin began trending down but now staying at 4; consider angiogram on Monday/Tuesday if creatinine/INR improving to help clarify cause of cardiomyopathy   -Continue Lasix as ordered  -Increasing hydralazine to improve afterload reduction  -Continue Plavix  -Plan to start Heparin when INR improves     DVT Prophylaxis: Warfarin  Code Status: Full Code     Chance Akira Argueta MD      Interval History   INR improving. Creatinine stable but not improving. Still some dyspnea. Troponin was trending down and now has been staying in the 4 range. Feels ok today. Still tired.     -Data reviewed today: I reviewed all new labs and imaging results over the last 24 hours.     Physical Exam   Temp: 97.9  F (36.6  C) Temp src: Oral BP: 96/60 Pulse: 97 Heart Rate: 88 Resp: 22 SpO2: 98 % O2 Device: None (Room air)    Vitals:    11/24/17 1042 11/25/17 0600 11/26/17 0507   Weight: 78 kg (172 lb) 76.3 kg (168 lb 4.8 oz) 76.5 kg (168 lb 11.2 oz)     Vital Signs with Ranges  Temp:  [97.9  F (36.6  C)-98.4  F (36.9  C)] 97.9  F (36.6  C)  Pulse:  [97] 97  Heart Rate:  [70-91] 88  Resp:  [20-22] 22  BP: ()/(60-79) 96/60  SpO2:  [92 %-100 %] 98 %  I/O last 3 completed shifts:  In: 856 [P.O.:856]  Out: 1075 [Urine:1075]    Constitutional: No apparent distress. Non-labored breathing.   Eyes: No xanthelasma or conjunctivitis  Respiratory: Some crackles.   Cardiovascular: Irregular, normal rate  Extremities: Trace peripheral edema.  Neurologic: Moving all extremities. No facial assymmetry.  Psychiatric: Alert and oriented. Answers questions appropriately.      Medications     - MEDICATION INSTRUCTIONS -         insulin isophane human  9 Units Subcutaneous Daily with supper     furosemide  20 mg Intravenous BID     hydrALAZINE  20 mg Oral Q8H RUBEN     insulin isophane human  12 Units Subcutaneous QAM AC     clopidogrel  75 mg Oral Daily     insulin aspart  1-7 Units Subcutaneous TID AC     insulin aspart  1-5 Units Subcutaneous At Bedtime     sodium chloride (PF)  3 mL Intracatheter Q8H       Data     Recent Labs  Lab 11/26/17  0512 11/25/17  2346 11/25/17  1822  11/25/17  0545  11/24/17  1054   WBC 10.5  --   --   --  9.0  --  9.3   HGB 11.6*  --   --   --  10.5*  --  11.7*   MCV 91  --   --   --  91  --  92     --   --   --  239  --  278   INR 3.69*  --   --   --  6.67*  --  6.51*     --   --   --  136  --  134   POTASSIUM 4.5  --   --   --  5.1  --  5.5*   CHLORIDE 101  --   --   --  101  --  100   CO2 26  --   --   --  26  --  24   BUN 63*  --   --   --  72*  --  70*   CR 2.33*  --   --   --  2.20*  --  2.24*   ANIONGAP 9  --   --   --  9  --  10   BERE 8.6  --   --   --  8.4*  --  9.1   GLC 63*  --   --   --  79  --  272*   ALBUMIN  --   --   --   --   --   --  3.6   PROTTOTAL  --   --   --   --   --   --  7.8   BILITOTAL  --   --   --   --   --   --  0.9   ALKPHOS  --   --   --   --   --   --  115   ALT  --   --   --   --   --   --  33   AST  --   --   --   --   --   --  41   TROPI 4.419* 4.134* 4.665*  < > 7.231*  < > 3.593*   < > = values in this interval not displayed.    No results found for this or any previous visit (from the past 24 hour(s)).

## 2017-11-26 NOTE — PROGRESS NOTES
Pt up in chair today. Ambulating in room with SBA. Reminded to save urine for accurate I & O. RA sats 96-98%. Slightly FORRESTER.

## 2017-11-26 NOTE — PLAN OF CARE
Problem: Cardiac: Heart Failure (Adult)  Goal: Signs and Symptoms of Listed Potential Problems Will be Absent, Minimized or Managed (Cardiac: Heart Failure)  Signs and symptoms of listed potential problems will be absent, minimized or managed by discharge/transition of care (reference Cardiac: Heart Failure (Adult) CPG).   VSS.  A/O, calls appropriately.  Frustrated with IV problems, having to be stuck frequently because they infiltrate.  Voiding smaller amounts per urinal, occ bathroom.  Tele is [id number].

## 2017-11-26 NOTE — PLAN OF CARE
Problem: Cardiac: Heart Failure (Adult)  Goal: Signs and Symptoms of Listed Potential Problems Will be Absent, Minimized or Managed (Cardiac: Heart Failure)  Signs and symptoms of listed potential problems will be absent, minimized or managed by discharge/transition of care (reference Cardiac: Heart Failure (Adult) CPG).   Outcome: No Change  Heart Failure Care Pathway  GOALS TO BE MET BEFORE DISCHARGE:    1. Decrease congestion and/or edema with diuretic therapy to achieve near      optimal volume status.            Dyspnea improved:  Yes,            Edema improved:     Yes        Net I/O and Weights since admission:          11/21 0700 - 11/26 0659  In: 1456 [P.O.:1456]  Out: 1750 [Urine:1750]  Net: -294            Vitals:    11/24/17 1042 11/25/17 0600 11/26/17 0507   Weight: 78 kg (172 lb) 76.3 kg (168 lb 4.8 oz) 76.5 kg (168 lb 11.2 oz)       2.  O2 sats > 92% on RA or at prior home O2 therapy level.          Current oxygenation status:       SpO2: 98 %         O2 Device: None (Room air),            Able to wean O2 this shift to keep sats > 92%:  Room air       Does patient use Home O2? No    3.  Tolerates ambulation and mobility near baseline: No        How many times did the patient ambulate with nursing staff this shift? 2    Please review the Heart Failure Care Pathway for additional HF goal outcomes.    Melanie Mata RN  11/26/2017

## 2017-11-27 NOTE — PROGRESS NOTES
[facility]    Cardiology Progress Note    Assessment & Plan   Dennys Ward is a 89 year old male who was admitted on 11/24/2017.     1.  Acute systolic heart failure LEVF 20% ( was 55% 11/2016) Dyspneic at rest. Needs more aggressive diuresis  2.  NSTEMI no chest pain  Peak troponin 7.2  3.  Diabetes Mellitus  4. Acute on top of chronic kidney disease : creatinine trending better, but not at baseline  5. Atrial fibrillation : VR control adequate. INR 2.49, will likely resume IVUFH tomorrow  6. Diarrhea onset prior to admission by patient report  7. Old stroke    Recommendation:  1) I do not feel he is ready for diagnostic CAG today, given his current renal function, anticoagulation status, diarrhea and heart failure symptoms. There is no pressing urgency to expose him to the risk of contrast exposure at this time.  2) increase furosemide to 40 IV bid while monitoring renal function   3) Begin IV heparin once INR less than or near 2.0  4) treat diarrhea  5) Will consider diagnostic coronary angiography at a later time, however, he is not a good surgical candidate and is currently pain free  6) will hold off on ACE or ARB, will continue hydralazine with ISDN and consider low dose beta blocker in future depending upon progress and resolution of Acute systolic heart failure    Sebastien Booth MD    Interval History   Mildly dyspneic at rest. Cr 2.03, improved but not at baseline. INR 2.49. His furosemide was held this AM in anticipation of possible angiogram. No chest pain.     Physical Exam   Temp: 97.5  F (36.4  C) Temp src: Oral BP: 113/73   Heart Rate: 93 Resp: 20 SpO2: 95 % O2 Device: None (Room air)    Vitals:    11/25/17 0600 11/26/17 0507 11/27/17 0147   Weight: 76.3 kg (168 lb 4.8 oz) 76.5 kg (168 lb 11.2 oz) 76.1 kg (167 lb 12.8 oz)     Vital Signs with Ranges  Temp:  [97.3  F (36.3  C)-97.9  F (36.6  C)] 97.5  F (36.4  C)  Heart Rate:  [84-93] 93  Resp:  [20-24] 20  BP:  ()/() 113/73  SpO2:  [95 %-98 %] 95 %  I/O last 3 completed shifts:  In: 930 [P.O.:930]  Out: 1275 [Urine:1275]    Constitutional: Awake, alert, cooperative, mildly dyspneic  Lungs: basilar crackles  Cardiovascular: iregular rate and rhythm, normal S1 and S2, and no murmur noted  Abdomen: Normal bowel sounds, soft, non-distended, non-tender  Skin: No rashes, no cyanosis, pedal edema 1 to 2+  Other:       Medications     - MEDICATION INSTRUCTIONS -         furosemide  40 mg Intravenous BID     insulin isophane human  9 Units Subcutaneous Daily with supper     hydrALAZINE  20 mg Oral Q8H RUBEN     insulin isophane human  12 Units Subcutaneous QAM AC     clopidogrel  75 mg Oral Daily     insulin aspart  1-7 Units Subcutaneous TID AC     insulin aspart  1-5 Units Subcutaneous At Bedtime       Data   I personally reviewed his rhythm strips, labs    Recent Labs  Lab 11/27/17  0600 11/26/17  0512 11/25/17  2346 11/25/17  1822  11/25/17  0545  11/24/17  1054   WBC  --  10.5  --   --   --  9.0  --  9.3   HGB  --  11.6*  --   --   --  10.5*  --  11.7*   MCV  --  91  --   --   --  91  --  92   PLT  --  245  --   --   --  239  --  278   INR 2.49* 3.69*  --   --   --  6.67*  --  6.51*    136  --   --   --  136  --  134   POTASSIUM 4.1 4.5  --   --   --  5.1  --  5.5*   CHLORIDE 100 101  --   --   --  101  --  100   CO2 26 26  --   --   --  26  --  24   BUN 54* 63*  --   --   --  72*  --  70*   CR 2.09* 2.33*  --   --   --  2.20*  --  2.24*   ANIONGAP 9 9  --   --   --  9  --  10   TROPI  --  4.419* 4.134* 4.665*  < > 7.231*  < > 3.593*   < > = values in this interval not displayed.  No results found for this or any previous visit (from the past 24 hour(s)).

## 2017-11-27 NOTE — PROGRESS NOTES
[facility]    Hospitalist Progress Note    Date of Service (when I saw the patient): 11/27/2017    Assessment & Plan   Dennys Ward is an 89 year-old male with past medical history significant for diabetes mellitus type 1 with neuropathy, hypertension, atrial fibrillation, chronic lower extremity edema, CVA, CKD, history of DVT who was brought to the ED for ht to the ER today for chest heaviness, dyspnea on exertion, subjective fevers/chills. Found to have elevated lactic acid, elevated troponin, new cardiomyopathy. Admitted 11/24/17.    NSTEMI  Presented with dyspnea on exertion, chest heaviness. Initial troponin 3.593. Echocardiogram in ED with EF <20% with severe global hypokinesis and minor regional variation. EKG on admission with Q waves in inferior leads. No previous EKG available for review. Troponin peak at 7.231  - Continues to deny any chest pain  - Plan to start heparin drip once INR <2   - Continue clopidogrel (aspirin allergy)  - Cardiology consulted, appreciate assistance  - Anticipate further ischemic work-up, ideally with angiogram, pending further improvement in renal function and INR. Will be reassessed daily.      Cardiogenic shock  Acute systolic congestive heart failure with exacerbation  Cardiomyopathy, unspecified type, new diagnosis  Mild to moderate mitral regurgitation   Echocardiogram on admission with EF < 20% with severe global hypokinesis and minor regional variation, no severe valvular pathology. Last echocardiogram 11/2016 with EF 55%, no prior clinical diagnosis of CHF. Low EF associated with hypotension, elevated lactic acid, MORIAH on arrival to ED, concerning for cardiogenic shock. Per chart review, initially on room air, became hypoxic following IVF in ED. Concern for NSTEMI on admission as above, other ddx for cardiomyopathy includes stress cardiomyopathy, viral myocarditis given associated URI symptoms.  - Daily weights, strict I/O  - Losartan on hold given  MORIAH. Started initially on hydralazine for afterload reduction, dose increased 11/26/17 and now isosorbide dinitrate added 11/27/2017 per cardiology.  - Continue furosemide IV, dose increased to 40 mg BID per cardiology   - Cardiology following, appreciate assistance    Diarrhea  Suspect antibiotic-associated from piperacillin-tazobactam IV. No preceding antibiotic use.  - Improving. RN reports soft, not watery stools now.    Nausea/vomiting  Appears to have occurred after coughing episode. No preceding nausea and tolerated intake 11/26/17.  - Anti-emetics PRN    Acute kidney injury on chronic kidney disease stage III  Baseline creatinine variable, most recently running 1.2-1.6 (last 1.56 10/17/17). Creatinine 2.24 on admission. UA on admission relatively bland with WBC 12, RBC 3, 10 protein on midstream specimen. Suspect primarily related to hypoperfusion with medications including PTA losartan and furosemide; hypotension; cardiorenal physiology from new cardiomyopathy all potential contributors. Renal US unremarkable.   - Renal function improving, but remains above baseline  - Optimize heart failure regimen as above.   - Monitor I/O  - Monitor BMP  - Continue to hold losartan    Sepsis unlikely  Noted to be tachycardic, with borderline BP and report of more significant hypotension preceding admission, lactic acid 5.4. Reported URI-type symptoms on admission including subjective fevers/chills, cough. CXR with minimal atelectasis or infiltrate left lung base. Influenza A/B negative. UA mildly abnormal (WBC 12, moderate LE). Procalcitonin 0.11, low risk of systemic bacterial infection   - Urine culture no growth on final cultures  - Blood cultures remain negative  - No evidence for worsening infection off of piperacillin-tazobactam IV   - Suspect his concerning admission vital signs and elevated lactic acid are cardiac rather than infectious in etiology    Diabetes mellitus, type 1, with diabetic polyneuropathy    Prior to admission on NPH 13 units with dinner and 15 units in AM + SSI (regular) with meals. HgbA1c 8.4% 11/24/17   - Blood sugars labile. Will transition to glargine for smoother basal coverage with variable intake, start glargine 20 units at noon. Will also add carbohydrate counting aspart to better cover his actual intake.   - Continue SSI    Hypertension  Hypotensive prior to admission. Prior to admission on losartan, furosemide.   - Losartan remains on hold due to MORIAH. On isosorbide dinitrate and hydralazine for afterload reduction as above     Atrial fibrillation  On chronic warfarin. Not on rate-controlling medications prior to admission.   - Warfarin on hold due to supratherapeutic INR with likely plan for angiogram. Plan to start heparin drip when < 2  - Continue telemetry     History of deep venous thrombosis  Chronic lower extremity edema  On chronic warfarin. Reports chronic lower extremity edema since that time, with features suggestive of venous insufficiency including absence of edema in the morning that worsens through the day.  - Warfarin remains on hold due to supratherapeutic INR with likely plan for angiogram     History of cerebrovascular accident   No residual neurological deficits.   - Anticoagulation / antiplatelet plan as outlined above    DVT Prophylaxis: Currently supratherapeutic on warfarin   Code Status: Full Code    Disposition: Expected discharge unclear, pending completion of cardiac work-up (possible angiogram 11/27/17), anticipate 2+ days    Murray Son    Interval History   Had episode of coughing, subsequently with belching and then dry heaving. No preceding nausea. Tolerated diet last night. Denies chest pain. Diarrhea improving, now with soft stools per RN.     -Data reviewed today: I reviewed all new labs and imaging results over the last 24 hours. I personally reviewed no images or EKG's today.    Physical Exam   Temp: 97.5  F (36.4  C) Temp src: Oral BP: 111/74    Heart Rate: 99 Resp: 20 SpO2: 97 % O2 Device: None (Room air)    Vitals:    11/25/17 0600 11/26/17 0507 11/27/17 0147   Weight: 76.3 kg (168 lb 4.8 oz) 76.5 kg (168 lb 11.2 oz) 76.1 kg (167 lb 12.8 oz)     Vital Signs with Ranges  Temp:  [97.3  F (36.3  C)-97.9  F (36.6  C)] 97.5  F (36.4  C)  Heart Rate:  [84-99] 99  Resp:  [20-24] 20  BP: ()/() 111/74  SpO2:  [95 %-98 %] 97 %  I/O last 3 completed shifts:  In: 930 [P.O.:930]  Out: 1275 [Urine:1275]    Constitutional: Elderly male, NAD  Respiratory: Lungs diminished bilateral bases, no crackles, no wheezes, normal effort at rest  Cardiovascular: Irregularly irregular, no m/r/g. Trace lower extremity edema.   GI: Soft, non-tender, non-distended.   Skin/Integumen: Dry, warm  Neuro: Alert. Responding to questions appropriately. Following commands.      Medications     - MEDICATION INSTRUCTIONS -         furosemide  40 mg Intravenous BID     isosorbide dinitrate  10 mg Oral TID     insulin isophane human  9 Units Subcutaneous Daily with supper     hydrALAZINE  20 mg Oral Q8H [facility]     insulin isophane human  12 Units Subcutaneous QAM AC     clopidogrel  75 mg Oral Daily     insulin aspart  1-7 Units Subcutaneous TID AC     insulin aspart  1-5 Units Subcutaneous At Bedtime       Data     Recent Labs  Lab 11/27/17  0600 11/26/17  0512 11/25/17  2346 11/25/17  1822  11/25/17  0545  11/24/17  1054   WBC  --  10.5  --   --   --  9.0  --  9.3   HGB  --  11.6*  --   --   --  10.5*  --  11.7*   MCV  --  91  --   --   --  91  --  92   PLT  --  245  --   --   --  239  --  278   INR 2.49* 3.69*  --   --   --  6.67*  --  6.51*    136  --   --   --  136  --  134   POTASSIUM 4.1 4.5  --   --   --  5.1  --  5.5*   CHLORIDE 100 101  --   --   --  101  --  100   CO2 26 26  --   --   --  26  --  24   BUN 54* 63*  --   --   --  72*  --  70*   CR 2.09* 2.33*  --   --   --  2.20*  --  2.24*   ANIONGAP 9 9  --   --   --  9  --  10   BERE 8.6 8.6  --   --   --  8.4*  --  9.1    GLC 97 63*  --   --   --  79  --  272*   ALBUMIN  --   --   --   --   --   --   --  3.6   PROTTOTAL  --   --   --   --   --   --   --  7.8   BILITOTAL  --   --   --   --   --   --   --  0.9   ALKPHOS  --   --   --   --   --   --   --  115   ALT  --   --   --   --   --   --   --  33   AST  --   --   --   --   --   --   --  41   TROPI  --  4.419* 4.134* 4.665*  < > 7.231*  < > 3.593*   < > = values in this interval not displayed.    No results found for this or any previous visit (from the past 24 hour(s)).

## 2017-11-27 NOTE — PLAN OF CARE
Problem: Cardiac: Heart Failure (Adult)  Goal: Signs and Symptoms of Listed Potential Problems Will be Absent, Minimized or Managed (Cardiac: Heart Failure)  Signs and symptoms of listed potential problems will be absent, minimized or managed by discharge/transition of care (reference Cardiac: Heart Failure (Adult) CPG).   Outcome: No Change  Heart Failure Care Pathway  GOALS TO BE MET BEFORE DISCHARGE:    1. Decrease congestion and/or edema with diuretic therapy to achieve near      optimal volume status.            Dyspnea improved:  Yes            Edema improved:     Yes        Net I/O and Weights since admission:          11/22 1500 - 11/27 1459  In: 2476 [P.O.:2476]  Out: 3025 [Urine:3025]  Net: -549            Vitals:    11/24/17 1042 11/25/17 0600 11/26/17 0507 11/27/17 0147   Weight: 78 kg (172 lb) 76.3 kg (168 lb 4.8 oz) 76.5 kg (168 lb 11.2 oz) 76.1 kg (167 lb 12.8 oz)       2.  O2 sats > 92% on RA or at prior home O2 therapy level.          Current oxygenation status:       SpO2: 99 %         O2 Device: None (Room air),            Able to wean O2 this shift to keep sats > 92%:  NA       Does patient use Home O2? No    3.  Tolerates ambulation and mobility near baseline: No, please explain: per Pt        How many times did the patient ambulate with nursing staff this shift? {NUMBERS 4    Please review the Heart Failure Care Pathway for additional HF goal outcomes.    Genoveva Rodriguez RN  11/27/2017     Pt has been free of chest pain but is not feeling the best today with V/signs stable and except for HR which is been 110's with activity ( A-fib).  Today's INR  2.49 so Dr Booth decided against Angio today for he felt the Pt needed to have further diuresis so lasix was increased to 40 mg IV BID. Pt has been in the chair x 2 today with assist of 1.

## 2017-11-27 NOTE — PLAN OF CARE
Problem: Cardiac: Heart Failure (Adult)  Goal: Signs and Symptoms of Listed Potential Problems Will be Absent, Minimized or Managed (Cardiac: Heart Failure)  Signs and symptoms of listed potential problems will be absent, minimized or managed by discharge/transition of care (reference Cardiac: Heart Failure (Adult) CPG).   Outcome: No Change  Heart Failure Care Pathway  GOALS TO BE MET BEFORE DISCHARGE:    1. Decrease congestion and/or edema with diuretic therapy to achieve near      optimal volume status.            Dyspnea improved:  Yes            Edema improved:     Yes        Net I/O and Weights since admission:          11/21 2300 - 11/26 2259  In: 2386 [P.O.:2386]  Out: 2650 [Urine:2650]  Net: -264            Vitals:    11/24/17 1042 11/25/17 0600 11/26/17 0507 11/27/17 0147   Weight: 78 kg (172 lb) 76.3 kg (168 lb 4.8 oz) 76.5 kg (168 lb 11.2 oz) 76.1 kg (167 lb 12.8 oz)       2.  O2 sats > 92% on RA or at prior home O2 therapy level.          Current oxygenation status:       SpO2: 97 %         O2 Device: None (Room air),            Able to wean O2 this shift to keep sats > 92%:  NA       Does patient use Home O2? No    3.  Tolerates ambulation and mobility near baseline: Yes        How many times did the patient ambulate with nursing staff this shift? 2 - up to bathroom    Please review the Heart Failure Care Pathway for additional HF goal outcomes.    Pt denies pain. vitals stable on RA. Up w/ A-1. continues to have crackles in rt lung field. NPO since midnight, following creat and INR, possible angio. Blood sugar stable. tele A-fib w/ CVR. continue to monitor.     Emi Biggs RN  11/27/2017

## 2017-11-27 NOTE — PROGRESS NOTES
Cardiology PM note    Breathing better. Diarrhea persists.   Plan   Continue present cardiac therapy  His diarrhea is being addressed by his internists could be due to antibiotic therapy  Re check bmp /inr in am      [place]

## 2017-11-27 NOTE — PLAN OF CARE
Problem: Cardiac: Heart Failure (Adult)  Goal: Signs and Symptoms of Listed Potential Problems Will be Absent, Minimized or Managed (Cardiac: Heart Failure)  Signs and symptoms of listed potential problems will be absent, minimized or managed by discharge/transition of care (reference Cardiac: Heart Failure (Adult) CPG).   Outcome: No Change  Heart Failure Care Pathway  GOALS TO BE MET BEFORE DISCHARGE:    1. Decrease congestion and/or edema with diuretic therapy to achieve near      optimal volume status.            Dyspnea improved:  Yes            Edema improved:     Yes        Net I/O and Weights since admission:          11/21 1500 - 11/26 1459  In: 1936 [P.O.:1936]  Out: 1850 [Urine:1850]  Net: 86            Vitals:    11/24/17 1042 11/25/17 0600 11/26/17 0507   Weight: 78 kg (172 lb) 76.3 kg (168 lb 4.8 oz) 76.5 kg (168 lb 11.2 oz)       2.  O2 sats > 92% on RA or at prior home O2 therapy level.          Current oxygenation status:       SpO2: 96 %         O2 Device: None (Room air),            Able to wean O2 this shift to keep sats > 92%:  Yes om RA        Does patient use Home O2? No    3.  Tolerates ambulation and mobility near baseline: Yes        How many times did the patient ambulate with nursing staff this shift? 2 ambulated to bathroom     Please review the Heart Failure Care Pathway for additional HF goal outcomes.    Aysha Ortiz RN  11/26/2017   Tele: A fib Voiding well. Pt states diarrhea is resolved. Plan for angio Monday or Tuesday.

## 2017-11-28 NOTE — PROGRESS NOTES
"   11/28/17 1101   General Information   Onset Date 11/24/17   Start of Care Date 11/28/17   Referring Physician Dr. Son   Patient Profile Review/OT: Additional Occupational Profile Info See Profile for full history and prior level of function   Patient/Family Goals Statement Patient agreed to further evaluation with swallow studies.     Swallowing Evaluation Bedside swallow evaluation   Behaviorial Observations Alert   Mode of current nutrition Oral diet   Type of oral diet Regular;Thin liquid   Respiratory Status Room air   Comments Per MD note: Dennys Ward is an 89 year-old male with past medical history significant for diabetes mellitus type 1 with neuropathy, hypertension, atrial fibrillation, chronic lower extremity edema, CVA, CKD, history of DVT who was brought to the ED for ht to the ER today for chest heaviness, dyspnea on exertion, subjective fevers/chills. Found to have elevated lactic acid, elevated troponin, new cardiomyopathy. Admitted 11/24/17.    Dx: MI, cargiogenic shock  Hx includes recent weight loss, globus sensation, \"gagging\", belching, mucus in throat, pneumonia a year ago, decreased appetite/po, regurgitation, baseline cough.   Clinical Swallow Evaluation   Dentition upper dentures  (missing lower teeth)   Oral Labial Strength and Mobility impaired retraction  (Decrease on left mild)   Lingual Strength and Mobility WFL   Clinical Swallow Eval: Thin Liquid Texture Trial   Mode of Presentation, Thin Liquids cup;straw   Volume of Liquid or Food Presented sips x cup x 7, by straw x 1   Oral Phase of Swallow WFL   Pharyngeal Phase of Swallow intact   Diagnostic Statement no overt signs of aspiration with trials by cup, slight throat clear after trial by straw, belching after most trials   Clinical Swallow Eval: Solid Food Texture Trial   Mode of Presentation, Solid self-fed   Volume of Solid Food Presented bite x 1 hard solid, bite x 4 soft solid   Oral Phase, Solid (decreased rotary " mastication)   Pharyngeal Phase, Solid intact   Diagnostic Statement asked for water after trials, mild globus sensation, no overt signs of aspiration    Swallow Compensations   Swallow Compensations Alternate viscosity of consistencies;Multiple swallow;Reduce amounts;Pacing   Esophageal Phase of Swallow   Patient reports or presents with symptoms of esophageal dysphagia Yes   Esophageal comments Esophagram/esophageal viewing recommended.   Swallow Eval: Clinical Impressions   Skilled Criteria for Therapy Intervention Skilled criteria met.  Treatment indicated.   Functional Assessment Scale (FAS) 5   Treatment Diagnosis mild oral-pharyngeal and moderate esophageal signs of dysphagia   Diet texture recommendations Regular diet;Thin liquids  (soft food selection)   Recommended Feeding/Eating Techniques (see below)   Therapy Frequency 5 times/wk   Predicted Duration of Therapy Intervention (days/wks) 1 week   Anticipated Discharge Disposition (to be determined)   Risks and Benefits of Treatment have been explained. Yes   Patient, family and/or staff in agreement with Plan of Care Yes   Clinical Impression Comments Patient presents with mild oral-pharyngeal and moderate esophageal signs of dysphagia at bedside.  Recommend soft diet texture selection from a regular diet and thin liquids with precautions including: sit up at 90 degrees, remain upright for 30-60 minutes, small bites/sips, avoid straws, chew carefully, double swallows, alternate textures, slow rate, smaller meals at a sitting, meds in puree as needed.  Hold po if increased signs of aspiration/respiratory status declines.  Recommend completion of a video swallow study and esophagram on 11/29.  Swallow Tx with education was provided after the evaluation.  Further results and recommendations to follow on 11/29 after swallow studies.   Total Evaluation Time   Total Evaluation Time (Minutes) 15

## 2017-11-28 NOTE — PLAN OF CARE
"Problem: Cardiac: Heart Failure (Adult)  Goal: Signs and Symptoms of Listed Potential Problems Will be Absent, Minimized or Managed (Cardiac: Heart Failure)  Signs and symptoms of listed potential problems will be absent, minimized or managed by discharge/transition of care (reference Cardiac: Heart Failure (Adult) CPG).   Outcome: No Change  Heart Failure Care Pathway  GOALS TO BE MET BEFORE DISCHARGE:    1. Decrease congestion and/or edema with diuretic therapy to achieve near      optimal volume status.            Dyspnea improved:  Yes            Edema improved:     Yes        Net I/O and Weights since admission:          11/22 1500 - 11/27 1459  In: 2476 [P.O.:2476]  Out: 3025 [Urine:3025]  Net: -549            Vitals:    11/24/17 1042 11/25/17 0600 11/26/17 0507 11/27/17 0147   Weight: 78 kg (172 lb) 76.3 kg (168 lb 4.8 oz) 76.5 kg (168 lb 11.2 oz) 76.1 kg (167 lb 12.8 oz)       2.  O2 sats > 92% on RA or at prior home O2 therapy level.          Current oxygenation status:       SpO2: 100 %         O2 Device: None (Room air),            Able to wean O2 this shift to keep sats > 92%:  NA       Does patient use Home O2? No    3.  Tolerates ambulation and mobility near baseline: No, please explain: Generalized weakness noted. Pt states he does \"feel weaker\" d/t not ambulating enough.         How many times did the patient ambulate with nursing staff this shift? 2- in room/bathroom    Please review the Heart Failure Care Pathway for additional HF goal outcomes.    Pt denies any pain this shift. Per pt, weaker d/t not being as active as he is use to. Pt remains in Afib with CVR. Trace edema noted in bilateral feet/ankles. Pt did complain of shortness of breath with activity but not at rest. Pt SPO2 % on RA. No further issues noted.    JUVENAL QUINTERO RN  11/27/2017           "

## 2017-11-28 NOTE — PROGRESS NOTES
SPIRITUAL HEALTH SERVICES Progress Note  [facility]    Initial visit per LOS and staff referral.  Pt was with his son Cj, and both recognized SH's name as the same as their  (Shan the [facility], [place]).  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  Pt recalled his career and shared stories about his family (including another son and daughter).  Pt expresses pride in his children and gratitude for their support.  Pt also notes and he and his spouse moved into an AL setting last year and said that's going well.    Pt shared little about this hospitalization, but did note that his kidneys can't tolerate the contrast dye needed for an angiogram.  Pt did not express any concerns, and welcomed SH to stop back any time.  SH provided emotional/spiritual support and prayer; and will plan to visit again in 1-2 days.                                                                                                                                           Daniel Pichardo M.Div., [facility]  Staff   Pager [phone]

## 2017-11-28 NOTE — PLAN OF CARE
Problem: Patient Care Overview  Goal: Plan of Care/Patient Progress Review  Outcome: No Change  Heart Failure Care Pathway  GOALS TO BE MET BEFORE DISCHARGE:    1. Decrease congestion and/or edema with diuretic therapy to achieve near      optimal volume status.            Dyspnea improved:  yes            Edema improved:     Yes        Net I/O and Weights since admission:          11/22 2300 - 11/27 2259  In: 2826 [P.O.:2826]  Out: 3150 [Urine:3150]  Net: -324            Vitals:    11/24/17 1042 11/25/17 0600 11/26/17 0507 11/27/17 0147   Weight: 78 kg (172 lb) 76.3 kg (168 lb 4.8 oz) 76.5 kg (168 lb 11.2 oz) 76.1 kg (167 lb 12.8 oz)       2.  O2 sats > 92% on RA or at prior home O2 therapy level.          Current oxygenation status:       SpO2: 96 %         O2 Device: None (Room air),            Able to wean O2 this shift to keep sats > 92%:  NA-pt on RA       Does patient use Home O2? No    3.  Tolerates ambulation and mobility near baseline: No, please explain: Pt has generalized weakness, requiring walker and SBA to ambulate to bathroom.         How many times did the patient ambulate with nursing staff this shift? 2 times to BR    Please review the Heart Failure Care Pathway for additional HF goal outcomes.    Pt a/o, VSS on RA. Denies pain. Up w/ SBA and walker.Generalized weakness. LS diminished with faint crackles to RLL. FORRESTER. Poor PO intake overnight, low urine output. Trace edema to bilateral ankles and feet.     Patricia Ferrari RN  11/28/2017

## 2017-11-28 NOTE — PLAN OF CARE
Problem: Patient Care Overview  Goal: Plan of Care/Patient Progress Review      Discharge Planner SLP   Patient plan for discharge: Did not state  Current status: A bedside swallow evaluation was completed this am.  Patient presents with mild oral-pharyngeal and moderate esophageal signs of dysphagia at bedside.  Recommend soft diet texture selection from a regular diet and thin liquids with precautions including: sit up at 90 degrees, remain upright for 30-60 minutes, small bites/sips, avoid straws, chew carefully, double swallows, alternate textures, slow rate, smaller meals at a sitting, meds in puree as needed.  Hold po if increased signs of aspiration/respiratory status declines.  Recommend completion of a video swallow study and esophagram on 11/29.  Swallow Tx with education was provided after the evaluation.  Further results and recommendations to follow on 11/29 after swallow studies.  Barriers to return to prior living situation: To be determined  Recommendations for discharge: To be determined   Rationale for recommendations: To be determined       Entered by: Bev Abbott 11/28/2017 10:53 AM

## 2017-11-28 NOTE — PROGRESS NOTES
[facility]    Cardiology Progress Note    Assessment & Plan   Dennys Ward is a 89 year old male who was admitted on 11/24/2017.     1.  Acute systolic heart failure LEVF 20% ( was 55% 11/2016) Denies  dyspnea at rest.  Lasix increased yesterday, no change in weight, I/O incomplete, but poor out put report on the night shift.  2.  NSTEMI no chest pain  Peak troponin 7.2  3.  Diabetes Mellitus  4. Acute on top of chronic kidney disease : creatinine trending up today from 2.02.> 2.55  5. Atrial fibrillation : VR control adequate. INR 2.49<2.22 today, will likely resume IVUFH tomorrow  6. Diarrhea onset prior to admission by patient report, No further diarrhea  7. Old stroke    Recommendations:  1) Still  Not ready diagnostic CAG today, given his current renal function, anticoagulation status, and heart failure symptoms. There is no pressing urgency to expose him to the risk of contrast exposure at this time.  2) recommend renal consult given worsening CR.  3) Begin IV heparin once INR less than or near 2.0  5) Will consider diagnostic coronary angiography at a later time, however, he is not a good surgical candidate and is currently pain free  6) will hold off on ACE or ARB, will continue hydralazine with ISDN and consider low dose beta blocker in future depending upon progress and resolution of Acute systolic heart failure    Cardiology staff    I have seen and examined the patient. We greatly appreciate 's input. Obviously, we will need to hold off any contrast testing and avoid ACE/ARB for now. His lungs demonstrate dullness at the base. Heart tones are regular. I think most practical approach will be to resume warfarin as CAG appears to be something that we will delay for a long time.  Dr.Manoles Alee Restrepo, APRN    Interval History   Able to talk now without dyspnea. Cr is climbing from at baseline. INR 2.22. His furosemide was held this AM due to raising CR. No chest pain.      Physical Exam   Temp: 96.5  F (35.8  C) Temp src: Oral BP: 101/68   Heart Rate: 91 Resp: 20 SpO2: 100 % (Ear Probe) O2 Device: None (Room air)    Vitals:    11/26/17 0507 11/27/17 0147 11/28/17 0530   Weight: 76.5 kg (168 lb 11.2 oz) 76.1 kg (167 lb 12.8 oz) 75.9 kg (167 lb 6 oz)     Vital Signs with Ranges  Temp:  [96.5  F (35.8  C)-97.8  F (36.6  C)] 96.5  F (35.8  C)  Heart Rate:  [] 91  Resp:  [18-20] 20  BP: ()/(58-74) 101/68  SpO2:  [90 %-100 %] 100 %  I/O last 3 completed shifts:  In: 640 [P.O.:640]  Out: 225 [Urine:225]    Constitutional: Awake, alert, cooperative,  Lungs: basilar crackles  Cardiovascular: iregular rate and rhythm, normal S1 and S2, and no murmur noted  Abdomen: Normal bowel sounds, soft, non-distended, non-tender  Skin: No rashes, no cyanosis, pedal edema-trace  Other:       Medications     - MEDICATION INSTRUCTIONS -         furosemide  40 mg Intravenous BID     isosorbide dinitrate  10 mg Oral TID     insulin glargine  20 Units Subcutaneous Q24H     insulin aspart   Subcutaneous TID AC     hydrALAZINE  20 mg Oral Q8H RUBEN     clopidogrel  75 mg Oral Daily     insulin aspart  1-7 Units Subcutaneous TID AC     insulin aspart  1-5 Units Subcutaneous At Bedtime       Data   I personally reviewed his rhythm strips, labs    Recent Labs  Lab 11/28/17  0615 11/27/17  0600 11/26/17  0512 11/25/17  2346 11/25/17  1822  11/25/17  0545  11/24/17  1054   WBC  --   --  10.5  --   --   --  9.0  --  9.3   HGB  --   --  11.6*  --   --   --  10.5*  --  11.7*   MCV  --   --  91  --   --   --  91  --  92   PLT  --   --  245  --   --   --  239  --  278   INR 2.22* 2.49* 3.69*  --   --   --  6.67*  --  6.51*    135 136  --   --   --  136  --  134   POTASSIUM 5.2 4.1 4.5  --   --   --  5.1  --  5.5*   CHLORIDE 98 100 101  --   --   --  101  --  100   CO2 21 26 26  --   --   --  26  --  24   BUN 68* 54* 63*  --   --   --  72*  --  70*   CR 2.55* 2.09* 2.33*  --   --   --  2.20*  --  2.24*    ANIONGAP 14 9 9  --   --   --  9  --  10   TROPI  --   --  4.419* 4.134* 4.665*  < > 7.231*  < > 3.593*   < > = values in this interval not displayed.  No results found for this or any previous visit (from the past 24 hour(s)).

## 2017-11-28 NOTE — PROVIDER NOTIFICATION
Pt with 100ml urine output overnight. Bladder scan done at 0300 for 39ml and again at 0600 for 82ml. Lasix increased to 40mg IV BID yesterday. Poor PO intake. MD paged to notify of low urine output.     0640 addendum: Spoke with Dr. Quiros, no new orders at this time. Will continue to monitor and notify MD if pt exhibits signs of hypovolemia.

## 2017-11-28 NOTE — PROVIDER NOTIFICATION
MD Notification    Notified Person:  MD    Notified Persons Name: Dr Son  & Dr Davis     Notification Date/Time: Nov 28th at 15:15    Notification Interaction: Web paged Dr Son ( Dr Davis was notified per phone)    Purpose of Notification: No U/O (Bladder scan done with 188 cc noted)    Orders Received: No orders received per Dr Palomo    Comments: Waiting call from Dr Son

## 2017-11-28 NOTE — CONSULTS
IDENTIFICATION:  Dennys Ward is an 89-year-old male admitted through the emergency room on 11/24/2017 in the setting of increasing shortness of breath, dyspnea on exertion, chest pain and generalized malaise.      REASON FOR CONSULTATION:  Evaluation and assessment with respect to apparent acute on chronic renal insufficiency.      REQUESTING PHYSICIAN:  Murray Son MD, Hospitalist Service      IMPRESSION:   1.  Chronic kidney disease, stage III, with a baseline creatinine in the range of approximately 1.5 mg/dL.  Estimated GFR roughly 40 mL per minute.  Potential underlying etiologies include longstanding hypertension and diabetes.   2.  Intermittent dipstick-positive albuminuria.   3.  Acute on chronic renal insufficiency with presenting creatinine 2.24 mg per deciliter in the setting of concern for congestive heart failure.  His hospital course has been marked by diuresis in the setting of diarrhea and poor p.o. intake likely leading to at least initial prerenal state.  His creatinine today is 2.55.   4.  Borderline hyperkalemia on admission, now corrected.   5.  Modest decreased serum bicarbonate, concern for metabolic acidosis.   6.  Non-ST elevation myocardial infarction.   7.  Apparent cardiomyopathy with an ejection fraction less than 20%, presumed in part ischemic.   8.  Diabetes with neuropathy.   9.  Hypertension.  Outpatient medications include an ARB and diuretic.   10.  History of thromboembolic disease.      DISCUSSION:  Elderly male with a number of comorbid medical problems who has advanced chronic kidney disease presumably on the basis of a combination of hypertension and diabetes.  He now has developed acute on chronic renal insufficiency which is likely at least initially prerenal and now may represent ATN.  He has been receiving diuresis since admission.  In this setting, he has had poor p.o. intake, as well as ongoing diarrhea, the latter of which may be in part explaining his metabolic  acidosis.      Renal ultrasound was nondiagnostic and showed bilateral symmetric kidneys of reasonable size.  His urinalysis shows some degree of proteinuria.  His creatinine has increased today from 2.09-2.55.  His diuretic should be held.  Further workup undertaken includes urine sodium and repeat urine albumin to creatinine ratio, as well as evaluation for mineral bone disease.  Gentle hydration with saline seems appropriate.  He is at high risk for IV contrast-induced acute renal failure and I would not recommend contrast exposure at this time.      PLAN:   1.  Discontinue diuretics.   2.  Document urine sodium.   3.  Quantification of urine albumin.   4.  PTH, vitamin D, calcium and phosphorus levels.   5.  Normal saline hydration.   6.  Continue to hold ARB.   7.  We will follow with you.      HISTORY OF PRESENT ILLNESS:  The patient primarily receives care through the [facility] system.  He does not have an outpatient nephrologist by his report.  He does have some labs that were reviewed in the Care Everywhere segment of the medical record.  His baseline creatinine is typically in the range of 1.3-1.5 mg/dL.  Estimated GFR on that basis is 40 mL per minute.  He has multiple UAs which do show some degree of albuminuria.      He presented to the emergency room on the 24th with shortness of breath.  Workup at that time in the emergency room showed him to be with a blood pressure of less than 100, in atrial fibrillation.  His initial sats were 99%.  His chest x-ray showed bibasilar pleural effusions.  No mention of significant pulmonary congestion.      He has been diuresed subsequent to admission with a creatinine on presentation of 2.24 which decreased to 2.22, but subsequently up to 2.33, back down again yesterday to 2.09 and today 2.55.      His electrolytes are otherwise not problematic.  He describes some degree of poor intake and abnormal or difficult gag reflex which limits his p.o. intake.  He believes his  diarrhea is improving.  He is not having chest pain or shortness of breath and appears comfortable at present.      No other complaints or concerns.      PAST MEDICAL HISTORY:   1.  Diabetes.   2.  Hypertension.   3.  Chronic kidney disease.   4.  Neuropathy.   5.  Thromboembolic disease.   6.  Atrial fibrillation.   7.  History of anemia.   8.  Osteoarthritis.   9.  History of edema.      ALLERGIES:  Aspirin and lisinopril.      ADMISSION MEDICATIONS:  Insulin, acetaminophen, vitamin D, Plavix, furosemide, losartan, magnesium, warfarin.      SOCIAL HISTORY:  Remote alcohol and tobacco.  , lives with his wife.      FAMILY HISTORY:  No renal disease of note.      REVIEW OF SYSTEMS:  Complete 10-point review is undertaken, pertinent positives and negatives are as I noted above.      PHYSICAL EXAMINATION:   VITAL SIGNS:  He is afebrile, his rhythm is irregular, his rate is , his sats are 100% on room air.  I&O reviewed.  Decreasing urine output noted.   GENERAL:  No acute distress.  Appropriate and interactive.   HEENT:  Normocephalic, atraumatic.  Pupils equal, round, reactive to light and accommodation.  Extraocular muscles intact.  Sclerae are nonicteric, conjunctivae not injected.  External auditory canals and nares are visually patent.   NECK:  Supple.   CHEST:  Symmetric and clear with decreased base sounds at the bases, but crackles or wheezes are not appreciated.   CARDIOVASCULAR:  Irregularly irregular S1, S2.   ABDOMEN:  Soft and nontender.   EXTREMITIES:  Without cyanosis or clubbing.  He has trace lower extremity edema.   NEUROLOGIC:  Grossly nonfocal.  Cranial nerves intact.   PSYCHIATRIC:  Normal affect, pleasant mood.      LABORATORY DATA:  Current and historic reviewed in detail.         RAJAT ESPOSITO MD             D: 2017 10:13   T: 2017 10:46   MT: TS      Name:     LEON RAMIREZ   MRN:      [id number]        Account:       [id number]   :      1928            Consult Date:  11/28/2017      Document: [id number]

## 2017-11-28 NOTE — PROGRESS NOTES
[facility]    Hospitalist Progress Note    Date of Service (when I saw the patient): 11/28/2017    Assessment & Plan   Dennys Ward is an 89 year-old male with past medical history significant for diabetes mellitus type 1 with neuropathy, hypertension, atrial fibrillation, chronic lower extremity edema, CVA, CKD, history of DVT who was brought to the ED for ht to the ER today for chest heaviness, dyspnea on exertion, subjective fevers/chills. Found to have elevated lactic acid, elevated troponin, new cardiomyopathy. Admitted 11/24/17.    NSTEMI  Presented with dyspnea on exertion, chest heaviness. Initial troponin 3.593. Echocardiogram in ED with EF <20% with severe global hypokinesis and minor regional variation. EKG on admission with Q waves in inferior leads. No previous EKG available for review. Troponin peak at 7.231  - Continues to deny any chest pain  - Plan to start heparin drip once INR <2   - Continue clopidogrel (aspirin allergy)  - Cardiology consulted, appreciate assistance  - Anticipate further ischemic work-up, ideally with angiogram, pending further improvement in renal function and INR. Will be reassessed daily.      Cardiogenic shock  Acute systolic congestive heart failure with exacerbation  Cardiomyopathy, unspecified type, new diagnosis  Mild to moderate mitral regurgitation   Echocardiogram on admission with EF < 20% with severe global hypokinesis and minor regional variation, no severe valvular pathology. Last echocardiogram 11/2016 with EF 55%, no prior clinical diagnosis of CHF. Low EF associated with hypotension, elevated lactic acid, MORIAH on arrival to ED, concerning for cardiogenic shock. Per chart review, initially on room air, became hypoxic following IVF in ED. Concern for NSTEMI on admission as above, other ddx for cardiomyopathy includes stress cardiomyopathy, viral myocarditis given associated URI symptoms.  - Daily weights, strict I/O  - Losartan on hold given  MORIAH. Continues on hydralazine and isosorbide dinitrate for afterload reduction in interim   - Holding furosemide due to bump in creatinine   - Cardiology following, appreciate assistance    Diarrhea, resolved  Suspect antibiotic-associated from piperacillin-tazobactam IV. No preceding antibiotic use.  - Resolved. Now having soft stools with decreased frequency.    Hypersensitive gag reflex   Dyspepsia   Ongoing issues this hospitalization with heightened gag reflex causing episodes of dry heaves and vomiting. Reports has been present mildly for 2 months, worsened over past 1-2 weeks. Associated with recent URI-type symptoms, which may be contributing  - Oropharyngeal exam unremarkable  - Reports post-nasal drip symptoms, will start fluticasone nasal spray  - No overt aspiration, but will have SLP assess  - Start anti-reflux regimen with renally dosed ranitidine, trial of GI cocktail; reports history of mid-sternal discomfort with belching improved with Pepto bismol PTA      Acute kidney injury on chronic kidney disease stage III  Baseline creatinine variable, most recently running 1.2-1.6 (last 1.56 10/17/17). Creatinine 2.24 on admission. UA on admission relatively bland with WBC 12, RBC 3, 10 protein on midstream specimen. Suspect primarily related to hypoperfusion with medications including PTA losartan and furosemide; hypotension; cardiorenal physiology from new cardiomyopathy all potential contributors. Renal US unremarkable.   - Renal function initially improving, now again worsening. His intake has been poor and suspect over-diuresis may be contributing   - Nephrology consulted, discussed with Dr. Palomo  - Monitor I/O  - Daily BMP  - Continue to hold losartan     Sepsis unlikely  Noted to be tachycardic, with borderline BP and report of more significant hypotension preceding admission, lactic acid 5.4. Reported URI-type symptoms on admission including subjective fevers/chills, cough. CXR with minimal  atelectasis or infiltrate left lung base. Influenza A/B negative. UA mildly abnormal (WBC 12, moderate LE). Procalcitonin 0.11, low risk of systemic bacterial infection. Urine culture no growth on final cultures  - Blood cultures remain negative  - No evidence for worsening infection off of piperacillin-tazobactam IV. Continue to suspect his concerning admission vital signs and elevated lactic acid are cardiac rather than infectious in etiology    Diabetes mellitus, type 1, with diabetic polyneuropathy   Prior to admission on NPH 13 units with dinner and 15 units in AM + SSI (regular) with meals. HgbA1c 8.4% 11/24/17   - Blood sugars have been labile in the setting of variable intake. Less hypoglycemia since transitioned to glargine. Continue glargine 20 units daily with carbohydrate counting prandial aspart   - Continue SSI    Hypertension  Hypotensive prior to admission. Prior to admission on losartan, furosemide.   - Losartan remains on hold due to MORIAH. On isosorbide dinitrate and hydralazine for afterload reduction as above     Atrial fibrillation  On chronic warfarin. Not on rate-controlling medications prior to admission.   - Warfarin on hold due to supratherapeutic INR with likely plan for angiogram. Plan to start heparin drip when < 2  - Continue telemetry     History of deep venous thrombosis  Chronic lower extremity edema  On chronic warfarin. Reports chronic lower extremity edema since that time, with features suggestive of venous insufficiency including absence of edema in the morning that worsens through the day.  - Warfarin remains on hold due to supratherapeutic INR with likely plan for angiogram     History of cerebrovascular accident   No residual neurological deficits.   - Anticoagulation / antiplatelet plan as outlined above    DVT Prophylaxis: INR therapeutic    Code Status: Full Code    Disposition: Expected discharge unclear, pending completion of cardiac work-up (possible angiogram 11/27/17),  improvement in renal function     Murray Son    Interval History   Continues to have hypersensitive gag reflex, with gagging while brushing his teeth. He has had this symptom mildly for about 2 months, but not limiting in any way. Has been worse for the past 1-2 weeks, and now occurs every time he brushes his teeth, also resulting in some decreased overall oral intake. Has had recent URI prior to admission, also with increased rhinorrhea and sensation of mucous dripping down the back of his throat. Also reports history of mid-sternal discomfort with belching improved with Pepto bismol PTA. Does not routinely follow an anti-reflux regimen. Has had no further diarrhea, last stools have been loose.     -Data reviewed today: I reviewed all new labs and imaging results over the last 24 hours. I personally reviewed no images or EKG's today.    Physical Exam   Temp: 96.5  F (35.8  C) Temp src: Oral BP: 101/68   Heart Rate: 91 Resp: 20 SpO2: 100 % (Ear Probe) O2 Device: None (Room air)    Vitals:    11/26/17 0507 11/27/17 0147 11/28/17 0530   Weight: 76.5 kg (168 lb 11.2 oz) 76.1 kg (167 lb 12.8 oz) 75.9 kg (167 lb 6 oz)     Vital Signs with Ranges  Temp:  [96.5  F (35.8  C)-97.8  F (36.6  C)] 96.5  F (35.8  C)  Heart Rate:  [] 91  Resp:  [18-20] 20  BP: ()/(58-74) 101/68  SpO2:  [90 %-100 %] 100 %  I/O last 3 completed shifts:  In: 640 [P.O.:640]  Out: 225 [Urine:225]    Constitutional: Elderly male, NAD  ENT: Posterior oropharynx and uvula unremarkable   Respiratory: Lungs diminished bilateral bases, no crackles, no wheezes, normal effort at rest  Cardiovascular: Irregularly irregular, no m/r/g. Trace lower extremity edema.   GI: Soft, non-tender, non-distended.   Skin/Integumen: Dry, warm  Neuro: Alert. Responding to questions appropriately. Following commands.      Medications     - MEDICATION INSTRUCTIONS -         ranitidine  150 mg Oral Q24H     fluticasone  1 spray Both Nostrils Daily     furosemide   40 mg Intravenous BID     isosorbide dinitrate  10 mg Oral TID     insulin glargine  20 Units Subcutaneous Q24H     insulin aspart   Subcutaneous TID AC     hydrALAZINE  20 mg Oral Q8H RUBEN     clopidogrel  75 mg Oral Daily     insulin aspart  1-7 Units Subcutaneous TID AC     insulin aspart  1-5 Units Subcutaneous At Bedtime       Data     Recent Labs  Lab 11/28/17  0615 11/27/17  0600 11/26/17  0512 11/25/17  2346 11/25/17  1822  11/25/17  0545  11/24/17  1054   WBC  --   --  10.5  --   --   --  9.0  --  9.3   HGB  --   --  11.6*  --   --   --  10.5*  --  11.7*   MCV  --   --  91  --   --   --  91  --  92   PLT  --   --  245  --   --   --  239  --  278   INR 2.22* 2.49* 3.69*  --   --   --  6.67*  --  6.51*    135 136  --   --   --  136  --  134   POTASSIUM 5.2 4.1 4.5  --   --   --  5.1  --  5.5*   CHLORIDE 98 100 101  --   --   --  101  --  100   CO2 21 26 26  --   --   --  26  --  24   BUN 68* 54* 63*  --   --   --  72*  --  70*   CR 2.55* 2.09* 2.33*  --   --   --  2.20*  --  2.24*   ANIONGAP 14 9 9  --   --   --  9  --  10   BERE 9.1 8.6 8.6  --   --   --  8.4*  --  9.1   * 97 63*  --   --   --  79  --  272*   ALBUMIN  --   --   --   --   --   --   --   --  3.6   PROTTOTAL  --   --   --   --   --   --   --   --  7.8   BILITOTAL  --   --   --   --   --   --   --   --  0.9   ALKPHOS  --   --   --   --   --   --   --   --  115   ALT  --   --   --   --   --   --   --   --  33   AST  --   --   --   --   --   --   --   --  41   TROPI  --   --  4.419* 4.134* 4.665*  < > 7.231*  < > 3.593*   < > = values in this interval not displayed.    No results found for this or any previous visit (from the past 24 hour(s)).

## 2017-11-29 NOTE — PHARMACY-ANTICOAGULATION SERVICE
Clinical Pharmacy - Warfarin Dosing Consult     Pharmacy has been consulted to manage this patient s warfarin therapy.  Indication: Atrial Fibrillation  Therapy Goal: INR 2-3  Warfarin Prior to Admission: Yes  Warfarin PTA Regimen: 7.5mg MWF; 5mg all other days    INR   Date Value Ref Range Status   11/29/2017 1.83 (H) 0.86 - 1.14 Final   11/28/2017 2.22 (H) 0.86 - 1.14 Final       Recommend warfarin 7.5 mg today.  Pharmacy will monitor Dennys Ward daily and order warfarin doses to achieve specified goal.      Please contact pharmacy as soon as possible if the warfarin needs to be held for a procedure or if the warfarin goals change.

## 2017-11-29 NOTE — PROGRESS NOTES
[facility]    Hospitalist Progress Note    Date of Service (when I saw the patient): 11/29/2017    Assessment & Plan   Dennys Ward is an 89 year-old male with past medical history significant for diabetes mellitus type 1 with neuropathy, hypertension, atrial fibrillation, chronic lower extremity edema, CVA, CKD, history of DVT who was brought to the ED for ht to the ER today for chest heaviness, dyspnea on exertion, subjective fevers/chills. Found to have elevated lactic acid, elevated troponin, new cardiomyopathy. Admitted 11/24/17.    Advanced care planning  Discussed with patient current diagnoses, limitations of management. Discussed code status and prognosis for meaningful recovery in that situation.   - Patient leaning toward DNR/DNI, but would like to think about it and talk with son  - Will re-visit with patient later 11/29/2017, ideally with son present to help better clarify goals moving forward     ADDENDUM: Revisited with patient with son present. Discussed code status again, no changes made but would be appropriate to change to DNR/DNI at any time if patient makes a definitive decision. Goals remain restorative at present, with plan to optimize medications and diet, improve symptoms and have therapy evaluations to determine appropriate placement. Plan for continued goals of care discussions as an outpatient with his primary care provider unless there is a significant decompensation this hospitalization.     NSTEMI  Presented with dyspnea on exertion, chest heaviness. Initial troponin 3.593. Echocardiogram in ED with EF <20% with severe global hypokinesis and minor regional variation. EKG on admission with Q waves in inferior leads. No previous EKG available for review. Troponin peak at 7.231  - Continues to deny any chest pain  - INR < 2, will start heparin drip  - Continue clopidogrel (aspirin allergy)  - Cardiology consulted, appreciate assistance  - Discussed with Dr. Booth,  agree that risks of angiogram outweigh benefits at this time. Plan to continue with medical management and can reassess his candidacy as an outpatient if renal function improves and patient desires invasive investigations    Cardiogenic shock  Acute systolic congestive heart failure exacerbation  Cardiomyopathy, unspecified type, new diagnosis  Mild to moderate mitral regurgitation   Echocardiogram on admission with EF < 20% with severe global hypokinesis and minor regional variation, no severe valvular pathology. Last echocardiogram 11/2016 with EF 55%, no prior clinical diagnosis of CHF. Low EF associated with hypotension, elevated lactic acid, MORIAH on arrival to ED, concerning for cardiogenic shock. Per chart review, initially on room air, became hypoxic following IVF in ED. Concern for NSTEMI on admission as above, other ddx for cardiomyopathy includes stress cardiomyopathy, viral myocarditis given associated URI symptoms.  - Daily weights, strict I/O  - Losartan on hold given MORIAH. Continues on hydralazine and isosorbide dinitrate for afterload reduction in interim   - Continue to hold furosemide due to bump in creatinine, poor intake   - Cardiology following, appreciate assistance    Diarrhea, resolved  Suspect antibiotic-associated from piperacillin-tazobactam IV. No preceding antibiotic use.  - Currently having loose/soft, NOT watery stools.    Hypersensitive gag reflex   Dyspepsia   Ongoing issues this hospitalization with heightened gag reflex causing episodes of dry heaves and vomiting. Reports has been present mildly for 2 months, worsened over past 1-2 weeks. Associated with recent URI-type symptoms, which may be contributing. Oropharyngeal exam unremarkable  - Reports improvement 11/29/17  - Associated with post-nasal drip symptoms, start fluticasone nasal spray  - No overt aspiration, but SLP consulted to jez. Planning for video swallow 11/29/17  - Continue anti-reflux regimen with renally dosed  ranitidine    Acute kidney injury on chronic kidney disease stage III  Baseline creatinine variable, most recently running 1.2-1.6 (last 1.56 10/17/17). Creatinine 2.24 on admission. UA on admission relatively bland with WBC 12, RBC 3, 10 protein on midstream specimen. Suspect primarily related to hypoperfusion with medications including PTA losartan and furosemide; hypotension; cardiorenal physiology from new cardiomyopathy all potential contributors. Renal US unremarkable.   - Renal function initially improving, then again worsening. His intake has been poor and suspect over-diuresis may be contributing. Received additional IVF 11/28/17  - Renal function more or less stable  - Nephrology consulted, appreciate assistance   - Monitor I/O  - Daily BMP  - Continue to hold losartan     Sepsis unlikely  Noted to be tachycardic, with borderline BP and report of more significant hypotension preceding admission, lactic acid 5.4. Reported URI-type symptoms on admission including subjective fevers/chills, cough. CXR with minimal atelectasis or infiltrate left lung base. Influenza A/B negative. UA mildly abnormal (WBC 12, moderate LE). Procalcitonin 0.11, low risk of systemic bacterial infection. Urine culture no growth on final cultures  - Blood cultures NGTD  - No evidence for worsening infection off of piperacillin-tazobactam IV. Continue to suspect his concerning admission vital signs and elevated lactic acid are cardiac rather than infectious in etiology    Diabetes mellitus, type 1, with diabetic polyneuropathy   Prior to admission on NPH 13 units with dinner and 15 units in AM + SSI (regular) with meals. HgbA1c 8.4% 11/24/17   - Blood sugars have labile in the setting of variable intake, stabilized with transition to glargine. Continue current dose of glargine and carbohydrate counting prandial aspart   - Continue SSI    Hypertension  Hypotensive prior to admission. Prior to admission on losartan, furosemide.   -  Losartan remains on hold due to MORIAH. On isosorbide dinitrate and hydralazine for afterload reduction as above     Chronic atrial fibrillation  On chronic warfarin. Not on rate-controlling medications prior to admission. CHADS2-VASc is 8.   - Resume warfarin as not planning on angiogram at this point  - Heparin drip started as above, reasonable to bridge given his high CHADS2-VASc  - Continue telemetry     History of deep venous thrombosis  Chronic lower extremity edema  On chronic warfarin. Reports chronic lower extremity edema since that time, with features suggestive of venous insufficiency including absence of edema in the morning that worsens through the day.  - Heparin gtt + warfarin resumption as above     History of cerebrovascular accident   No residual neurological deficits.   - Anticoagulation / antiplatelet plan as outlined above    DVT Prophylaxis: Heparin gtt + warfarin restarted   Code Status: Full Code    Disposition: Expected discharge unclear, pending completion of swallow evaluation, improvement in renal function, optimization of medication regimen, determination of goals of care. PT/OT consulted to assist with discharge planning. Anticipate 1-2 days.    Murray Son    Interval History   Reports gagging has improved some. Still belching. Short of breath with activity, but able to get to bathroom with assistance of walker without stopping. Denies any chest pain. Had loose, but not watery stool.     -Data reviewed today: I reviewed all new labs and imaging results over the last 24 hours. I personally reviewed no images or EKG's today.    Physical Exam   Temp: 97.8  F (36.6  C) Temp src: Oral BP: 90/65   Heart Rate: 78 Resp: 18 SpO2: 97 % O2 Device: None (Room air)    Vitals:    11/27/17 0147 11/28/17 0530 11/29/17 0641   Weight: 76.1 kg (167 lb 12.8 oz) 75.9 kg (167 lb 6 oz) 76.5 kg (168 lb 9.6 oz)     Vital Signs with Ranges  Temp:  [97.4  F (36.3  C)-98.6  F (37  C)] 97.8  F (36.6  C)  Heart  Rate:  [] 78  Resp:  [16-20] 18  BP: ()/(65-85) 90/65  SpO2:  [95 %-99 %] 97 %  I/O last 3 completed shifts:  In: 700 [P.O.:700]  Out: 520 [Urine:520]    Constitutional: Elderly male, NAD  Respiratory: Lungs diminished bilateral bases, no crackles, no wheezes, normal effort at rest  Cardiovascular: Irregularly irregular, no m/r/g. Trace lower extremity edema.   GI: Soft, non-tender, non-distended.   Skin/Integumen: Dry, warm  Neuro: Alert. Responding to questions appropriately. Following commands.      Medications     Warfarin Therapy Reminder       HEParin       IV infusion builder WITH additives 50 mL/hr (11/28/17 1037)     - MEDICATION INSTRUCTIONS -         heparin Loading Dose  4,300 Units Intravenous Once     ranitidine  150 mg Oral Q24H     fluticasone  1 spray Both Nostrils Daily     isosorbide dinitrate  10 mg Oral TID     insulin glargine  20 Units Subcutaneous Q24H     insulin aspart   Subcutaneous TID AC     hydrALAZINE  20 mg Oral Q8H RUBEN     clopidogrel  75 mg Oral Daily     insulin aspart  1-7 Units Subcutaneous TID AC     insulin aspart  1-5 Units Subcutaneous At Bedtime       Data     Recent Labs  Lab 11/29/17  0550 11/28/17  0615 11/27/17  0600 11/26/17  0512 11/25/17  2346 11/25/17  1822  11/25/17  0545  11/24/17  1054   WBC  --   --   --  10.5  --   --   --  9.0  --  9.3   HGB  --   --   --  11.6*  --   --   --  10.5*  --  11.7*   MCV  --   --   --  91  --   --   --  91  --  92   PLT  --   --   --  245  --   --   --  239  --  278   INR 1.83* 2.22* 2.49* 3.69*  --   --   --  6.67*  --  6.51*    133 135 136  --   --   --  136  --  134   POTASSIUM 4.6 5.2 4.1 4.5  --   --   --  5.1  --  5.5*   CHLORIDE 99 98 100 101  --   --   --  101  --  100   CO2 24 21 26 26  --   --   --  26  --  24   BUN 75* 68* 54* 63*  --   --   --  72*  --  70*   CR 2.44* 2.55* 2.09* 2.33*  --   --   --  2.20*  --  2.24*   ANIONGAP 10 14 9 9  --   --   --  9  --  10   BERE 8.7 9.1 8.6 8.6  --   --   --  8.4*   --  9.1   * 176* 97 63*  --   --   --  79  --  272*   ALBUMIN  --   --   --   --   --   --   --   --   --  3.6   PROTTOTAL  --   --   --   --   --   --   --   --   --  7.8   BILITOTAL  --   --   --   --   --   --   --   --   --  0.9   ALKPHOS  --   --   --   --   --   --   --   --   --  115   ALT  --   --   --   --   --   --   --   --   --  33   AST  --   --   --   --   --   --   --   --   --  41   TROPI  --   --   --  4.419* 4.134* 4.665*  < > 7.231*  < > 3.593*   < > = values in this interval not displayed.    No results found for this or any previous visit (from the past 24 hour(s)).

## 2017-11-29 NOTE — PROGRESS NOTES
SW:  D:  Patient was admitted from [facility] on the [facility].  Patient may benefit from PT/OT evals to determine if he can return to his apartment or if he will need tcu placement.  Left a sticky note for patient's MD.  P:  Will continue to follow.

## 2017-11-29 NOTE — PLAN OF CARE
Problem: Cardiac: Heart Failure (Adult)  Goal: Signs and Symptoms of Listed Potential Problems Will be Absent, Minimized or Managed (Cardiac: Heart Failure)  Signs and symptoms of listed potential problems will be absent, minimized or managed by discharge/transition of care (reference Cardiac: Heart Failure (Adult) CPG).   Outcome: No Change  Heart Failure Care Pathway  GOALS TO BE MET BEFORE DISCHARGE:    1. Decrease congestion and/or edema with diuretic therapy to achieve near      optimal volume status.            Dyspnea improved:  No, dyspnea on exertion            Edema improved:     No, +1 edema to BLE        Net I/O and Weights since admission:          11/23 2300 - 11/28 2259  In: 3726 [P.O.:3726]  Out: 3370 [Urine:3370]  Net: 356            Vitals:    11/24/17 1042 11/25/17 0600 11/26/17 0507 11/27/17 0147   Weight: 78 kg (172 lb) 76.3 kg (168 lb 4.8 oz) 76.5 kg (168 lb 11.2 oz) 76.1 kg (167 lb 12.8 oz)    11/28/17 0530   Weight: 75.9 kg (167 lb 6 oz)       2.  O2 sats > 92% on RA or at prior home O2 therapy level.          Current oxygenation status:       SpO2: 95 %         O2 Device: None (Room air),            Able to wean O2 this shift to keep sats > 92%:  Yes       Does patient use Home O2? No    3.  Tolerates ambulation and mobility near baseline:No, generalized weakness, requiring SBA and walker to ambulate to bathroom        How many times did the patient ambulate with nursing staff this shift? In room to bathroom x2    Please review the Heart Failure Care Pathway for additional HF goal outcomes.    Monitoring creatinine levels, IV lasix d/c yesterday, low UO overnight, awaiting lab results. Trace edema to BLE unchanged. LS diminished, dyspnea on exertion. Waiting for INR<2 for initiation of hep gtt.  Blood sugars monitored, no insulin coverage overnight. Tele Afib CVR with BBB. Plan for video swallow today.    Anjali Taylor RN  11/29/2017

## 2017-11-29 NOTE — PROGRESS NOTES
Renal Medicine Progress Note                                Dennys Ward MRN# [id number]   Age: 89 year old YOB: 1928   Date of Admission: 11/24/2017 Hospital LOS: 5                  Assessment/Plan:     89-year-old male admitted through the emergency room on 11/24/2017 in the setting of   increasing SOB, FORRESTER, CP and generalized malaise.     Evaluatedt with respect to apparent acute on chronic renal insufficiency      1.  CKD stage III   -creatinine  1.5 mg/dl range   -GFR 40 ml/min range  2.  No microalbuminuria by current albumin to creatinine ratio  3.  ARF; concern for pre renal v cardio renal    -oliguric   -Omar 9   -creatinine stabilized  4.  Secondary hyperparathyroidism   -vitamin D level pending  5.  Hyperkalemia   -normalized  6.  Cardiomyopathy      Additional NS today  Holding ARB and diuretic      Interval History:     Comfortable.  Limited UO.  Not SOB.  No CP.  No cardiac intervention at this time  High risk for dye exposure      ROS:     GENERAL: NAD, No fever,chills  R: NEGATIVE for significant cough or SOB  CV: NEGATIVE for chest pain, palpitations  EXT: no change edema  ROS otherwise negative    Medications and Allergies:     Reviewed    Physical Exam:     Vitals were reviewed  Patient Vitals for the past 8 hrs:   BP Temp Temp src Heart Rate Resp SpO2 Weight   11/29/17 0800 90/65 97.8  F (36.6  C) Oral 78 18 97 % -   11/29/17 0641 - - - - - - 76.5 kg (168 lb 9.6 oz)   11/29/17 0551 108/85 - - 78 18 - -     I/O last 3 completed shifts:  In: 700 [P.O.:700]  Out: 520 [Urine:520]    Vitals:    11/25/17 0600 11/26/17 0507 11/27/17 0147 11/28/17 0530   Weight: 76.3 kg (168 lb 4.8 oz) 76.5 kg (168 lb 11.2 oz) 76.1 kg (167 lb 12.8 oz) 75.9 kg (167 lb 6 oz)    11/29/17 0641   Weight: 76.5 kg (168 lb 9.6 oz)         GENERAL: awake, alert, follows  HEENT: NC/AT, PERRLA, EOMI, non icteric, pharynx moist without lesion  RESP:  clear anteriorly  CV: RRR, normal S1 S2  ABDOMEN: soft,  nontender, no HSM or masses and bowel sounds normal  MS: no clubbing, cyanosis   SKIN: clear without significant rashes or lesions  NEURO: speech normal and cranial nerves 2-12 intact  PSYCH: affect normal/bright  EXT: trace edema    Data:       Recent Labs  Lab 11/29/17  0550 11/28/17  0615 11/27/17  0600 11/26/17  0512    133 135 136   POTASSIUM 4.6 5.2 4.1 4.5   CHLORIDE 99 98 100 101   CO2 24 21 26 26   ANIONGAP 10 14 9 9   * 176* 97 63*   BUN 75* 68* 54* 63*   CR 2.44* 2.55* 2.09* 2.33*   GFRESTIMATED 25* 24* 30* 26*   GFRESTBLACK 30* 29* 36* 32*   BERE 8.7 9.1 8.6 8.6         Recent Labs   Lab Test  11/29/17   0550  11/28/17   0615  11/27/17   0600  11/26/17   0512  11/25/17   0545  11/24/17   1054   CR  2.44*  2.55*  2.09*  2.33*  2.20*  2.24*       Recent Labs  Lab 11/24/17  1054   ALBUMIN 3.6       Recent Labs  Lab 11/29/17  0550 11/26/17  0512 11/25/17  0545 11/24/17  1054   PHOS 4.6*  --   --   --    HGB  --  11.6* 10.5* 11.7*       Recent Labs  Lab 11/29/17  0550   PTHI 153*       Recent Labs  Lab 11/28/17 1910   UNAR 9       Recent Labs  Lab 11/28/17 1910   UMALCR 18.79*         G Blanco Palomo    [facility] - Nephrology  [phone]

## 2017-11-29 NOTE — PLAN OF CARE
Problem: Patient Care Overview  Goal: Plan of Care/Patient Progress Review  Discharge Planner SLP   Patient plan for discharge: Did not state  Current status: Pt presents with mild dysphagia on video swallow study. Oral phase deficits with reduced bolus control with liquids and solids and reduced pharyngeal contraction. These deficits resulted in: premature spillage to pyriforms with liquids and valleculea with solids and moderate vallecular/mild pyriform residue. No penetration or aspiration noted during the evaluation. Coughing and belching noted during evaluation, however, did not appear to be related to swallow. Esophagus screened. Please refer to radiology note. Recommended: continue regular diet with selection of softer solids, alternate solids and liquids, slow rate, double swallow, upright for all intake.   Barriers to return to prior living situation: None from ST standpoint  Recommendations for discharge: Return to [facility] with short term ST services  Rationale for recommendations: Continued ST to train strategies and ensure diet tolerance       Entered by: Shira Gabriel 11/29/2017 1:15 PM

## 2017-11-29 NOTE — PLAN OF CARE
Problem: Patient Care Overview  Goal: Plan of Care/Patient Progress Review  PT: Evaluation attempted. Pt speaking with another care provider for an extended period of time. Will continue to follow.

## 2017-11-29 NOTE — PROGRESS NOTES
"   11/29/17 1318   General Information   Onset Date 11/24/17   Start of Care Date 11/28/17   Referring Physician Dr. Son   Patient Profile Review/OT: Additional Occupational Profile Info See Profile for full history and prior level of function   Patient/Family Goals Statement Did not state   Swallowing Evaluation Videofluoroscopic evaluation   Behaviorial Observations WFL (within functional limits)   Mode of current nutrition Oral diet   Type of oral diet Regular;Thin liquid   Comments Per MD note: Dnenys Ward is an 89 year-old male with past medical history significant for diabetes mellitus type 1 with neuropathy, hypertension, atrial fibrillation, chronic lower extremity edema, CVA, CKD, history of DVT who was brought to the ED for ht to the ER today for chest heaviness, dyspnea on exertion, subjective fevers/chills. Found to have elevated lactic acid, elevated troponin, new cardiomyopathy. Admitted 11/24/17. Dx: MI. Hx includes recent weight loss, globus sensation, \"gagging\", belching, mucus in throat, pneumonia a year ago, decreased appetite/po, regurgitation, baseline cough.   VFSS Eval: Radiology   Radiologist Dr. Luna   Views Taken left lateral   Physical Location of Procedure Room 5   VFSS Eval: Thin Liquid Texture Trial   Mode of Presentation, Thin Liquid cup;straw;self-fed   Order of Presentation 1, 2, 6   Preparatory Phase Poor bolus control;Holding   Oral Phase, Thin Liquid Poor AP movement;Premature pharyngeal entry   Pharyngeal Phase, Thin Liquid Residue in valleculae;Residue in pyriform sinus;Delayed swallow reflex   Rosenbek's Penetration Aspiration Scale: Thin Liquid Trial Results 1 - no aspiration, contrast does not enter airway   Diagnostic Statement No penetration or aspiration   VFSS Eval: Puree Solid Texture Trial   Mode of Presentation, Puree spoon   Order of Presentation 3   Preparatory Phase Holding;Insufficient mastication;Poor bolus control   Oral Phase, Puree Poor AP " movement;Residue in oral cavity;Premature pharyngeal entry   Pharyngeal Phase, Puree Delayed swallow reflex;Residue in valleculae;Residue in pyriform sinus   Rosenbek's Penetration Aspiration Scale: Puree Food Trial Results 1 - no aspiration, contrast does not enter airway   Diagnostic Statement No penetration or aspiration   VFSS Eval: Semisolid Texture Trial   Mode of Presentation, Semisolid self-fed;spoon   Order of Presentation 4   Preparatory Phase Holding;Insufficient mastication;Poor bolus control   Oral Phase, Semisolid Poor AP movement;Residue in oral cavity;Premature pharyngeal entry   Pharyngeal Phase, Semisolid Residue in valleculae   Rosenbek's Penetration Aspiration Scale: Semisolid Food Trial Results 1 - no aspiration, contrast does not enter airway   Diagnostic Statement No penetration or aspiration   VFSS Eval: Solid Food Texture Trial   Mode of Presentation, Solid self-fed   Order of Presentation 5   Preparatory Phase Holding;Insufficient mastication;Poor bolus control   Oral Phase, Solid Poor AP movement;Residue in oral cavity;Premature pharyngeal entry   Pharyngeal Phase, Solid Delayed swallow reflex;Residue in valleculae   Rosenbek's Penetration Aspiration Scale: Solid Food Trial Results 1 - no aspiration, contrast does not enter airway   Diagnostic Statement No penetration or aspiration   Swallow Compensations   Swallow Compensations Alternate viscosity of consistencies;Multiple swallow;Reduce amounts;Pacing   Esophageal Phase of Swallow   Patient reports or presents with symptoms of esophageal dysphagia Yes   Esophageal sweep performed during today s vidofluoroscopic exam  Please refer to radiologist's report for details   General Therapy Interventions   Planned Therapy Interventions Dysphagia Treatment   Dysphagia treatment Modified diet education;Instruction of safe swallow strategies   Swallow Eval: Clinical Impressions   Skilled Criteria for Therapy Intervention Skilled criteria met.   Treatment indicated.   Functional Assessment Scale (FAS) 5   Dysphagia Outcome Severity Scale (DHEERAJ) Level 5 - DHEERAJ   Treatment Diagnosis Mild dysphagia   Diet texture recommendations Regular diet;Thin liquids   Recommended Feeding/Eating Techniques alternate between small bites and sips of food/liquid;check mouth frequently for oral residue/pocketing;hard swallow w/ each bite or sip;maintain upright posture during/after eating for 30 mins;small sips/bites   Therapy Frequency 5 times/wk   Predicted Duration of Therapy Intervention (days/wks) 1 week   Anticipated Discharge Disposition home w/ home health   Risks and Benefits of Treatment have been explained. Yes   Patient, family and/or staff in agreement with Plan of Care Yes   Clinical Impression Comments Pt presents with mild dysphagia on video swallow study. Oral phase deficits with reduced bolus control with liquids and solids and reduced pharyngeal contraction. These deficits resulted in: premature spillage to pyriforms with liquids and valleculea with solids and moderate vallecular/mild pyriform residue. No penetration or aspiration noted during the evaluation. Coughing and belching noted during evaluation, however, did not appear to be related to swallow. Esophagus screened. Please refer to radiology note. Recommended: continue regular diet with selection of softer solids, alternate solids and liquids, slow rate, double swallow, upright for all intake.    Total Evaluation Time   Total Evaluation Time (Minutes) 21

## 2017-11-29 NOTE — PLAN OF CARE
Problem: Patient Care Overview  Goal: Plan of Care/Patient Progress Review  OT: Attempted however patient eating lunch and talking on the phone.

## 2017-11-29 NOTE — PLAN OF CARE
Problem: Cardiac: Heart Failure (Adult)  Goal: Signs and Symptoms of Listed Potential Problems Will be Absent, Minimized or Managed (Cardiac: Heart Failure)  Signs and symptoms of listed potential problems will be absent, minimized or managed by discharge/transition of care (reference Cardiac: Heart Failure (Adult) CPG).   Outcome: No Change  Heart Failure Care Pathway  GOALS TO BE MET BEFORE DISCHARGE:    1. Decrease congestion and/or edema with diuretic therapy to achieve near      optimal volume status.            Dyspnea improved:  Yes            Edema improved:     Yes        Net I/O and Weights since admission:          11/23 1500 - 11/28 1459  In: 3506 [P.O.:3506]  Out: 3250 [Urine:3250]  Net: 256            Vitals:    11/24/17 1042 11/25/17 0600 11/26/17 0507 11/27/17 0147   Weight: 78 kg (172 lb) 76.3 kg (168 lb 4.8 oz) 76.5 kg (168 lb 11.2 oz) 76.1 kg (167 lb 12.8 oz)    11/28/17 0530   Weight: 75.9 kg (167 lb 6 oz)       2.  O2 sats > 92% on RA or at prior home O2 therapy level.          Current oxygenation status:       SpO2: 97 %         O2 Device: None (Room air),            Able to wean O2 this shift to keep sats > 92%:  NA       Does patient use Home O2? No    3.  Tolerates ambulation and mobility near baseline: No, please explain: Gets SOB with activity        How many times did the patient ambulate with nursing staff this shift? 1    Please review the Heart Failure Care Pathway for additional HF goal outcomes.    Genoveva Rodriguez RN  11/28/2017     Pt has been pain free with V/signs stable and maintaining his O2 sats in the mid 90's on Rm air. Am Cr 2.55 so Renal consult was ordered by Dr Son. Dr Davis ordered 09.% Normal saline 50 cc/hr for 500 cc and DC IV Lasix. Pt had not voided ( after trying) so he was bladder scanned at 15:00 for a total of 180cc. Dr Son & Dr Davis were notified and no new orders were received.  Pt did void 120 cc at 19:00 and was bladder scan for 96 cc. U/A  was sent. Remains in A-fib in the 90's .[place] [id number]

## 2017-11-29 NOTE — PROGRESS NOTES
"[facility]    Cardiology Progress Note    Assessment & Plan   Dennys Ward is a 89 year old male who was admitted on 11/24/2017.     1.  Acute systolic heart failure : admission LVEF estimated 20% with diffuse hypokinesis. Coronary angiography being considered at some point. Currently on ISDN/ hydralazine with SBPs at 90  2.  Acute on top of chronic kidney disease: Creatinine slightly improved today. Putting out some urine  3.  Chronic atrial fibrillation: warfarin being held in anticipation of possible angiogram, will need to resume IVUFH until decision made  4.  Diabetes Mellitus  5. Old history of strokes : no residuae  6. Dysphagia : awaiting swallow test  7. Diarrhea ? Antibiotic related, has resumed, though less frequent    All in all he is very frail and I do not believe he is a candidate for C   he will not be ready for coronary angiography for at least 48 hours and possibly longer, but in meantime we will need to resume heparin. We greatly appreciate 's assistance with his acute on top of chronic kidney injury.  and I had a good discussion on management this AM and both agree that we will avoid invasive evaluation at this time.     Recommendation  1) continue ISDN/Hydralazine. Unable to use beta blocker, ACE, ARB or spironolactone in this setting  2) Follow renal function  3) resume IV UFH  Given current renal function, I would avoid LMWH.  4) Await GI tests  5) I would suggest a discussion with patient regarding resuscitation and palliative care. I doubt he will ever be a surgical candidate. PCI does not have established benefit in this setting, but may be reasonable if his renal function recovers. As we are uncertain as to what caused his cardiac injury ( diffuse hypokinesis without chest pain or q waves suggests \"nonischemic cause\" although he very likely has CAD given diabetes/age) I am uncertain percutaneous revascularization would be of benefit. The question " is moot at this time anyway in view of his current renal function, and ultimately it may be best to resume warfarin and wait on any decisions regarding angiography until renal function permits and LV systolic performance has been re evaluated.     Sebastien Booth MD    Interval History   Diarrhea had been better but he states it seems to be back today. He awaits barium swallow. No chest pain.    Physical Exam   Temp: 97.8  F (36.6  C) Temp src: Oral BP: 90/65   Heart Rate: 78 Resp: 18 SpO2: 97 % O2 Device: None (Room air)    Vitals:    11/27/17 0147 11/28/17 0530 11/29/17 0641   Weight: 76.1 kg (167 lb 12.8 oz) 75.9 kg (167 lb 6 oz) 76.5 kg (168 lb 9.6 oz)     Vital Signs with Ranges  Temp:  [97.4  F (36.3  C)-98.6  F (37  C)] 97.8  F (36.6  C)  Heart Rate:  [] 78  Resp:  [16-20] 18  BP: ()/(65-85) 90/65  SpO2:  [95 %-99 %] 97 %  I/O last 3 completed shifts:  In: 700 [P.O.:700]  Out: 520 [Urine:520]    Constitutional: Awake, alert, cooperative, no apparent distress  Lungs: Clear to auscultation bilaterally, no crackles or wheezing  Cardiovascular: Regular rate and rhythm, normal S1 and S2, and no murmur noted  Abdomen: Normal bowel sounds, soft, non-distended, non-tender  Skin: No rashes, no cyanosis, no edema  Other:       Medications     IV infusion builder WITH additives 50 mL/hr (11/28/17 1037)     - MEDICATION INSTRUCTIONS -         ranitidine  150 mg Oral Q24H     fluticasone  1 spray Both Nostrils Daily     isosorbide dinitrate  10 mg Oral TID     insulin glargine  20 Units Subcutaneous Q24H     insulin aspart   Subcutaneous TID AC     hydrALAZINE  20 mg Oral Q8H RUBEN     clopidogrel  75 mg Oral Daily     insulin aspart  1-7 Units Subcutaneous TID AC     insulin aspart  1-5 Units Subcutaneous At Bedtime       Data   I personally reviewed his labe, rhythm strip.    Recent Labs  Lab 11/29/17  0550 11/28/17  0615 11/27/17  0600 11/26/17  0512 11/25/17  2346 11/25/17  1822  11/25/17  0545   11/24/17  1054   WBC  --   --   --  10.5  --   --   --  9.0  --  9.3   HGB  --   --   --  11.6*  --   --   --  10.5*  --  11.7*   MCV  --   --   --  91  --   --   --  91  --  92   PLT  --   --   --  245  --   --   --  239  --  278   INR 1.83* 2.22* 2.49* 3.69*  --   --   --  6.67*  --  6.51*    133 135 136  --   --   --  136  --  134   POTASSIUM 4.6 5.2 4.1 4.5  --   --   --  5.1  --  5.5*   CHLORIDE 99 98 100 101  --   --   --  101  --  100   CO2 24 21 26 26  --   --   --  26  --  24   BUN 75* 68* 54* 63*  --   --   --  72*  --  70*   CR 2.44* 2.55* 2.09* 2.33*  --   --   --  2.20*  --  2.24*   ANIONGAP 10 14 9 9  --   --   --  9  --  10   TROPI  --   --   --  4.419* 4.134* 4.665*  < > 7.231*  < > 3.593*   < > = values in this interval not displayed.  No results found for this or any previous visit (from the past 24 hour(s)).

## 2017-11-30 NOTE — PLAN OF CARE
Problem: Cardiac: ACS (Acute Coronary Syndrome) (Adult)  Goal: Signs and Symptoms of Listed Potential Problems Will be Absent, Minimized or Managed (Cardiac: ACS)  Signs and symptoms of listed potential problems will be absent, minimized or managed by discharge/transition of care (reference Cardiac: ACS (Acute Coronary Syndrome) (Adult) CPG).   Outcome: No Change  No complaints of pain this shift. Pt has become dizzy and short of breath when getting up to the bathroom.  Pt has noted hypotension after getting up but has been able to acclimate with rest in chair.  Continuing to monitor and hold blood pressure meds per parameters.  Pt remains in Afib with CVR. Pt remains on room air and SPO2 95-97%.   EKG completed after pt c/o of chest heaviness, shortness of breath when up to bathroom.  EKG no changes. BP increased from 78/56 (when symptomatic) to 109/72.  Will continue to monitor.

## 2017-11-30 NOTE — PROGRESS NOTES
Walked pt to bathroom with assist x2 + walker for stability. Pt complains of dizziness with sitting, better after time passes. Dizziness worse when back to chair after BM, BP was 81/53. Symptoms resolved after 3-4 minutes. Blood sugar checked and it was 88. Chair alarm active.

## 2017-11-30 NOTE — PROGRESS NOTES
[facility]    Hospitalist Progress Note    Date of Service (when I saw the patient): 11/30/2017    Assessment & Plan   Dennys Wadr is a 89 year old male with hx of DM1, chronic a-fib on chronic anticoagulation with warfarin, CKD, hx of DVT, and prior CVA who presented on 11/24/2017 with chest pain and dyspnea on exertion, and was admitted for NSTEMI and cardiogenic shock with new diagnosis of CHF with EF <20%.    Advanced care planning  Previous hospitalist discussed acute diagnoses with patient and limitations of management. Discussed code status and prognosis of meaningful recovery in the situation. Following goals of care discussion with the patient and his son on 11/29, goals remain restorative with plan to optimize medications and diet, improve symptoms, and have therapy evaluations.  - Plan for continued goals of care discussions with his PCP  ADDENDUM: Revisited above with patient's son and he does not anticipate that patient will do well at rehab. I agree. Patient is currently able to get up to go to the bathroom, but becomes orthostatic and quite short of breath, ultimately recovers with rest. I will revisit plan of care with the patient again tomorrow with plan to recommend hospice    NSTEMI  Presented with dyspnea on exertion and chest heaviness. EKG on arrival showed Q waves in the inferior leads with no previous EKG to compare. Initial troponin 3.593 and peaked at 7.231. TTE on arrival showed LVEF <20% with severe global hypokinesis and minor regional variation. In the ED, he was not initiated on a heparin gtt because he was anticoagulated with warfarin. Cardiology was consulted on admission and felt that risks of coronary angiogram outweighed benefit, and recommended medical management with plan to reassess candidacy an an outpatient if renal function were to improve and patient felt strongly about intervention.   - Continues on PTA Plavix (aspirin allergy)  - Cardiac rehab  -  Cardiology following, appreciate input    Cardiogenic shock, Improved  Acute systolic CHF exacerbation, new diagnosis  Mild to moderate mitral regurgitation  TTE on arrival showed LVEF <20% with severe global hypokinesis and minor regional variation, no severe valvular pathology. He has no previous diagnosis of CHF. Prior TTE in Nov 2016 showed LVEF 55%. Associated hypotension, elevated lactic acid, and MORIAH also present on arrival. Ddx for cardiomyopathy include ischemia, stress cardiomyopathy, and viral myocarditis given recent URI sx.  - He has been started on Imdur 10 mg TID and hydralazine 20 mg TID  - Holding ACE/ARB, spironolactone, and Lasix due to MORIAH  - Cardiac rehab  - Cardiology following, appreciate assistance    Chronic atrial fibrillation  Auto rate-controlled. Maintained on warfarin for CHADS-VASc score 8. Warfarin was initially held on admission for angiogram, but has since been resumed.  - Continues on warfarin per PharmD dosing, bridging with heparin gtt    Essential hypertension  [PTA: Lasix 20 mg po daily, losartan 100 mg daily]  - Holding Lasix and losartan due to borderline blood pressures and MORIAH    Oliguric MORIAH on CKD stage III, Improved  Creatinine up to 2.55 from baseline 1.2-1.6. Attributed to hypoperfusion from CHF/NSTEMI, losartan, and Lasix vs cardiorenal syndrome also possible. Renal US (11/25) normal.  - Cr 2.28 today, will follow  - Nephrology following, appreciate assistance    Hypersensitivity gag reflex  Dyspepsia   Ongoing issues during this hospitalization with heightened gag reflex causing episodes of dry heaves and vomiting. Reports this has been present mildly for 2 months, worsened over previous 1-2 weeks and seemingly associated with post-nasal driop. Oropharyngeal exam normal. Video swallow study (11/29) normal.  - He has been started on fluticasone nasal spray and ranitidine with improvement    Diarrhea, Improving  Suspect antibiotic-associated from pip-tazo administered  on arrival for possible sepsis. 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 Blood sugars were initially labile on his PTA regimen, but stabilized with initiation of Lantus  - Blood sugars  in last 24 hours  - He has been started on Lantus 20 units daily, aspart 1u/carb unit TID AC  - Medium SSI available.    History of DVT  Maintained on warfarin which has been continued    FEN: mod CHO, 2 g sodium diet  DVT Prophylaxis: Heparin gtt and Warfarin  Code Status: Full Code    Disposition: Expected discharge once INR therapeutic and renal function continues to improve, possibly tomorrow. Anticipate TCU    Jocelyne Acosta    Interval History   No events overnight. Still having occasional loose stools, but not watery. Reports mild dyspnea on exertion, but otherwise denies chest pain or dizziness/lightheadedness    -Data reviewed today: I reviewed all new labs and imaging results over the last 24 hours. I personally reviewed no images or EKG's today.    Physical Exam   Temp: 97.5  F (36.4  C) Temp src: Oral BP: 94/64   Heart Rate: 92 Resp: 16 SpO2: 92 % O2 Device: None (Room air)    Vitals:    11/28/17 0530 11/29/17 0641 11/30/17 0125   Weight: 75.9 kg (167 lb 6 oz) 76.5 kg (168 lb 9.6 oz) 77.8 kg (171 lb 8 oz)     Vital Signs with Ranges  Temp:  [97.4  F (36.3  C)-97.9  F (36.6  C)] 97.5  F (36.4  C)  Heart Rate:  [83-92] 92  Resp:  [16-18] 16  BP: ()/(53-93) 94/64  SpO2:  [92 %-98 %] 92 %  I/O last 3 completed shifts:  In: 784 [P.O.:120; I.V.:664]  Out: 550 [Urine:550]    Constitutional: Appears comfortable, NAD  Respiratory: Breathing non-labored. Decreased breath sounds over bases bilaterally  Cardiovascular: Irregular rhythm, normal rate. Trace pedal edema  GI: +BS, abd soft/NT  Skin/Integumen: No rash  Neuro: Alert and appropriate, ALTMAN  Psych: Calm and  cooperative    Medications     Warfarin Therapy Reminder       HEParin 600 Units/hr (11/30/17 0115)     IV infusion builder WITH additives 50 mL/hr at 11/29/17 1449     - MEDICATION INSTRUCTIONS -         warfarin  7.5 mg Oral ONCE at 18:00     ranitidine  150 mg Oral Q24H     fluticasone  1 spray Both Nostrils Daily     isosorbide dinitrate  10 mg Oral TID     insulin glargine  20 Units Subcutaneous Q24H     insulin aspart   Subcutaneous TID AC     hydrALAZINE  20 mg Oral Q8H [facility]     clopidogrel  75 mg Oral Daily     insulin aspart  1-7 Units Subcutaneous TID AC     insulin aspart  1-5 Units Subcutaneous At Bedtime     Data     Recent Labs  Lab 11/30/17  0615 11/29/17  0550 11/28/17  0615  11/26/17  0512 11/25/17  2346 11/25/17  1822  11/25/17  0545  11/24/17  1054   WBC  --   --   --   --  10.5  --   --   --  9.0  --  9.3   HGB  --   --   --   --  11.6*  --   --   --  10.5*  --  11.7*   MCV  --   --   --   --  91  --   --   --  91  --  92   PLT  --   --   --   --  245  --   --   --  239  --  278   INR 1.86* 1.83* 2.22*  < > 3.69*  --   --   --  6.67*  --  6.51*   * 133 133  < > 136  --   --   --  136  --  134   POTASSIUM 4.2 4.6 5.2  < > 4.5  --   --   --  5.1  --  5.5*   CHLORIDE 98 99 98  < > 101  --   --   --  101  --  100   CO2 21 24 21  < > 26  --   --   --  26  --  24   BUN 77* 75* 68*  < > 63*  --   --   --  72*  --  70*   CR 2.28* 2.44* 2.55*  < > 2.33*  --   --   --  2.20*  --  2.24*   ANIONGAP 13 10 14  < > 9  --   --   --  9  --  10   BERE 8.8 8.7 9.1  < > 8.6  --   --   --  8.4*  --  9.1   GLC 61* 102* 176*  < > 63*  --   --   --  79  --  272*   ALBUMIN  --   --   --   --   --   --   --   --   --   --  3.6   PROTTOTAL  --   --   --   --   --   --   --   --   --   --  7.8   BILITOTAL  --   --   --   --   --   --   --   --   --   --  0.9   ALKPHOS  --   --   --   --   --   --   --   --   --   --  115   ALT  --   --   --   --   --   --   --   --   --   --  33   AST  --   --   --   --   --   --   --    --   --   --  41   TROPI  --   --   --   --  4.419* 4.134* 4.665*  < > 7.231*  < > 3.593*   < > = values in this interval not displayed.    No results found for this or any previous visit (from the past 24 hour(s)).

## 2017-11-30 NOTE — PLAN OF CARE
Problem: Patient Care Overview  Goal: Plan of Care/Patient Progress Review  Outcome: No Change  A&O, forgetful. [place]. Tele: A fib CVR w/ BBB, HR 80-90's. BP's soft, VSS on room air. Up w/ 1 and walker to bathroom. Heparin gtt running @ 850 units/hr, paused for one hour and reduced to 600 units/hr d/t hep10a level of 0.67. Hep10a scheduled for 2:40 AM, however d/t loss of IV access heparin gtt was paused. Consulted w/ pharmacy about accurate time of hep10 draw after heparin restarted. Advised to draw hep10a w/ morning labs @ 6:00 AM. Blood sugars stable before bed and at 2 AM check, no coverage needed. This AM blood sugar 61. 15 G of apple juice given, recheck 92. Pt asymptomatic. IVF of 500 ml's completed overnight.

## 2017-11-30 NOTE — PROGRESS NOTES
11/30/17 1530   Quick Adds   Type of Visit Initial Occupational Therapy Evaluation   Living Environment   Lives With spouse   Living Arrangements assisted living  ([facility])   Home Accessibility bed and bath on same level  (Walk in )   Number of Stairs Within Home (Has elevator access )   Transportation Available car   Living Environment Comment Per pt, spouse unable to provide assist    Self-Care   Usual Activity Tolerance moderate   Current Activity Tolerance fair   Regular Exercise no   Equipment Currently Used at Home (Has shower chair )   Functional Level Prior   Ambulation 0-->independent   Transferring 0-->independent   Toileting 0-->independent   Bathing 0-->independent   Dressing 0-->independent   Fall history within last six months no   Which of the above functional risks had a recent onset or change? transferring;toileting;bathing;dressing   General Information   Onset of Illness/Injury or Date of Surgery - Date 11/24/17   Referring Physician Adelaida   Patient/Family Goals Statement opened to rehab    Additional Occupational Profile Info/Pertinent History of Current Problem Per chart: Dennys Ward is a 89 year old male with hx of DM1, chronic a-fib on chronic anticoagulation with warfarin, CKD, hx of DVT, and prior CVA who presented on 11/24/2017 with chest pain and dyspnea on exertion, and was admitted for NSTEMI and cardiogenic shock with new diagnosis of CHF with EF <20%.   Precautions/Limitations fall precautions   Cognitive Status Examination   Orientation orientation to person, place and time   Level of Consciousness alert   Visual Perception   Visual Perception Wears glasses   Sensory Examination   Sensory Quick Adds (Numbness/tingling in RUE)   Pain Assessment   Patient Currently in Pain Yes, see Vital Sign flowsheet   Transfer Skills   Transfer Transfer Safety Analysis Bed/Chair;Transfer Skill: Stand to Sit;Transfer Safety Analysis Sit/Stand   Transfer Skill: Bed to Chair/Chair  "to Bed   Level of [place]: Bed to Chair contact guard   Transfer Skill: Sit to Stand   Level of [place]: Sit/Stand minimum assist (75% patients effort)   Toilet Transfer   Toilet Transfer Toilet Transfer Skill;Toilet Transfer Safety Analysis   Transfer Skill: Toilet Transfer   Level of [place]: Toilet moderate assist (50% patients effort)   Lower Body Dressing   Level of [place]: Dress Lower Body moderate assist (50% patients effort)   Instrumental Activities of Daily Living (IADL)   Previous Responsibilities meal prep;laundry;shopping;medication management;finances;driving  (2 meals provided,hired help for cleaning)   General Therapy Interventions   Planned Therapy Interventions ADL retraining;IADL retraining;transfer training   Clinical Impression   Criteria for Skilled Therapeutic Interventions Met yes, treatment indicated   OT Diagnosis Decreased ADLs and IADLs, functional transfers   Influenced by the following impairments decreased endurance for I/ADLs   Assessment of Occupational Performance 1-3 Performance Deficits   Identified Performance Deficits Decreased ADLs and IADLs (dressing, bathing, toileting), functional transfers   Clinical Decision Making (Complexity) Low complexity   Therapy Frequency daily   Predicted Duration of Therapy Intervention (days/wks) 5 days   Anticipated Discharge Disposition Transitional Care Facility   Risks and Benefits of Treatment have been explained. Yes   Patient, Family & other staff in agreement with plan of care Yes   [facility] [employer] TM \"6 Clicks\"   2016, Trustees of [facility], under license to [employer].  All rights reserved.   6 Clicks Short Forms Daily Activity Inpatient Short Form   [facility]  \"6 Clicks\" Daily Activity Inpatient Short Form   1. Putting on and taking off regular lower body clothing? 3 - A Little   2. Bathing (including washing, rinsing, drying)? 2 - A Lot   3. Toileting, which includes using toilet, " bedpan or urinal? 3 - A Little   4. Putting on and taking off regular upper body clothing? 3 - A Little   5. Taking care of personal grooming such as brushing teeth? 3 - A Little   6. Eating meals? 4 - None   Daily Activity Raw Score (Score out of 24.Lower scores equate to lower levels of function) 18   Total Evaluation Time   Total Evaluation Time (Minutes) 8

## 2017-11-30 NOTE — PROGRESS NOTES
"[facility]    Cardiology Progress Note    Assessment & Plan   Dennys Ward is a 89 year old male who was admitted on 11/24/2017.     1.  Acute systolic heart failure:   -admission LVEF estimated 20% with diffuse hypokinesis.   -coronary angiography being considered at some point.   -currently on ISDN/ hydralazine with SBPs   2.  Acute on top of chronic kidney disease:   -Creatinine improvivg today, 2.28. Putting out some urine, wt up 3 pounds, 171  3.  Chronic atrial fibrillation:   -warfarin held in anticipation of possible angiogram, but angiogram deferred d/t MORIAH.  -restart warfarin, cont heparin  4.  Diabetes Mellitus  5. Old history of strokes : no residuae  6. Dysphagia : swallow test was normal  7. Diarrhea ? Antibiotic related, has resumed and patient finds bothersome.     All in all he is very frail and I do not believe he will be ready for coronary angiography this admission. He gets hypotensive making further up titration of heart failure meds prohibitive. He appears willing to go to transitional care.  We greatly appreciate 's assistance with his acute on top of chronic kidney injury.     Recommendation  1) continue ISDN/Hydralazine. Unable to use beta blocker, ACE, ARB or spironolactone in this setting. May need to back off if continues to be hypotensive.  2) Follow renal function  3) resume warfarin, given current renal function, I would avoid LMWH.  4) I would suggest a discussion with patient regarding resuscitation and palliative care. I doubt he will ever be a surgical candidate. PCI does not have established benefit in this setting, but may be reasonable if his renal function recovers. As we are uncertain as to what caused his cardiac injury ( diffuse hypokinesis without chest pain or q waves suggests \"nonischemic cause\" although he very likely has CAD given diabetes/age) I am uncertain percutaneous revascularization would be of benefit. The question is moot at " this time anyway in view of his current renal function, and ultimately it may be best to resume warfarin and wait on any decisions regarding angiography until renal function permits and LV systolic performance has been re evaluated.       Discharge when able, cardiology follow up arranged with Dr. Sam, CHERYL prior Dec 6, new Core 12/22.       RUDY Good, CNP    Cardiology staff    He is quite frail, but stable over last few days and renal function has improved slowly. Clearly will not be able to undergo angiography this admission and seems very reasonable to go to transitional care to recover, undergo PT. I would advise therapeutic INR on warfarin, ISDN, Hydralazine. Once /if bp improves would consider beta blocker. If creatinine returns to baseline could consider CAG /changing to ARB, but he will need much improvement before then.   His lungs are clear. His heart tones irregular but VR controlled.  We will sign off at this time and have arranged outpatient follow up.     Interval History   Diarrhea had been better but still bothersome. No CP. Some FORRESTER and orthostatic lightheadedness.     Physical Exam   Temp: 97.5  F (36.4  C) Temp src: Oral BP: 94/64   Heart Rate: 92 Resp: 16 SpO2: 92 % O2 Device: None (Room air)    Vitals:    11/28/17 0530 11/29/17 0641 11/30/17 0125   Weight: 75.9 kg (167 lb 6 oz) 76.5 kg (168 lb 9.6 oz) 77.8 kg (171 lb 8 oz)     Vital Signs with Ranges  Temp:  [97.4  F (36.3  C)-97.9  F (36.6  C)] 97.5  F (36.4  C)  Heart Rate:  [] 92  Resp:  [16-18] 16  BP: ()/(53-93) 94/64  SpO2:  [92 %-98 %] 92 %  I/O last 3 completed shifts:  In: 784 [P.O.:120; I.V.:664]  Out: 550 [Urine:550]    Constitutional: Awake, alert, cooperative, no apparent distress, on RA  Lungs: Clear to auscultation bilaterally, no crackles or wheezing  Cardiovascular: Regular rate and rhythm, normal S1 and S2, and no murmur noted  Abdomen: Normal bowel sounds, soft, non-distended,  non-tender  Skin: No rashes, no cyanosis, no edema  Other:       Medications     Warfarin Therapy Reminder       HEParin 600 Units/hr (11/30/17 0115)     IV infusion builder WITH additives 50 mL/hr at 11/29/17 1449     - MEDICATION INSTRUCTIONS -         warfarin  7.5 mg Oral ONCE at 18:00     ranitidine  150 mg Oral Q24H     fluticasone  1 spray Both Nostrils Daily     isosorbide dinitrate  10 mg Oral TID     insulin glargine  20 Units Subcutaneous Q24H     insulin aspart   Subcutaneous TID AC     hydrALAZINE  20 mg Oral Q8H RUBEN     clopidogrel  75 mg Oral Daily     insulin aspart  1-7 Units Subcutaneous TID AC     insulin aspart  1-5 Units Subcutaneous At Bedtime       Data   I personally reviewed his labe, rhythm strip.    Recent Labs  Lab 11/30/17  0615 11/29/17  0550 11/28/17  0615  11/26/17  0512 11/25/17  2346 11/25/17  1822  11/25/17  0545  11/24/17  1054   WBC  --   --   --   --  10.5  --   --   --  9.0  --  9.3   HGB  --   --   --   --  11.6*  --   --   --  10.5*  --  11.7*   MCV  --   --   --   --  91  --   --   --  91  --  92   PLT  --   --   --   --  245  --   --   --  239  --  278   INR 1.86* 1.83* 2.22*  < > 3.69*  --   --   --  6.67*  --  6.51*   * 133 133  < > 136  --   --   --  136  --  134   POTASSIUM 4.2 4.6 5.2  < > 4.5  --   --   --  5.1  --  5.5*   CHLORIDE 98 99 98  < > 101  --   --   --  101  --  100   CO2 21 24 21  < > 26  --   --   --  26  --  24   BUN 77* 75* 68*  < > 63*  --   --   --  72*  --  70*   CR 2.28* 2.44* 2.55*  < > 2.33*  --   --   --  2.20*  --  2.24*   ANIONGAP 13 10 14  < > 9  --   --   --  9  --  10   TROPI  --   --   --   --  4.419* 4.134* 4.665*  < > 7.231*  < > 3.593*   < > = values in this interval not displayed.  No results found for this or any previous visit (from the past 24 hour(s)).

## 2017-11-30 NOTE — PLAN OF CARE
Problem: Patient Care Overview  Goal: Plan of Care/Patient Progress Review  Discharge Planner SLP   Patient plan for discharge: Pt would like to return to [facility], but is concerned with balance  Current status: Pt tolerating current diet and thin liquids with no overt s/sx of aspiration. Pt reported continued concerns with belching, however, noted improvements. Pt educated on strategies, diet modifications and video swallow study results. Recommended: continue regular diet with selection of softer solids, alternate solids and liquids, slow rate, double swallow, upright for all intake.   Barriers to return to prior living situation: None from ST standpoint; PT/OT evaluations pending  Recommendations for discharge: Return to [facility]  Rationale for recommendations: Short term ST to target strategies and diet tolerance       Entered by: Shira Gabriel 11/30/2017 11:00 AM

## 2017-11-30 NOTE — PROVIDER NOTIFICATION
Lactic acid level drawn per hospital protocol. Critical result of 4.9 received from lab. Info text paged to hospitalist, await further orders. Continue close monitoring of patient.     Spoke with Dr Acosta. No new orders at this time. Pt is feeling well and has stable vital signs. Continue to monitor.

## 2017-11-30 NOTE — PLAN OF CARE
Problem: Patient Care Overview  Goal: Plan of Care/Patient Progress Review  OT: Evaluation and treatment initiated. Pt lives in an assisted living facility with his spouse. Prior pt reported independence in all ADLs and some IADLs including driving, medication management. Pt receives 2 meals/day from the dinning schafer and has hired help for home management tasks.    Discharge Planner OT   Patient plan for discharge: opened to rehab   Current status:  While seated pt completed lower body dressing with minimum assist and extra time. Pt demonstrated fatigue during dressing task. Pt ambulated to the bathroom with CGA and transferred to and from the toilet with minimum-moderate assist. Pt completed toileting with CGA, extra time, and 3 rest breaks.  Pt demonstrated unsteadiness when on feet and SOB during functional tasks, as well as fatigue. RN made aware of vitals and progress.   Barriers to return to prior living situation: Current level of assist, decreased endurance for I/ADLs; orthostatic BP  Recommendations for discharge: TCU  Rationale for recommendations: Current level of assist        Entered by: Bryon Lincoln 11/30/2017 4:14 PM

## 2017-11-30 NOTE — PROGRESS NOTES
Renal Medicine Progress Note                                Dennys Ward MRN# [id number]   Age: 89 year old YOB: 1928   Date of Admission: 11/24/2017 Hospital LOS: 6                  Assessment/Plan:     89-year-old male admitted through the emergency room on 11/24/2017 in the setting of   increasing SOB, FORRESTER, CP and generalized malaise.     Evaluatedt with respect to apparent acute on chronic renal insufficiency      1.  CKD stage III   -creatinine  1.5 mg/dl range   -GFR 40 ml/min range  2.  No microalbuminuria by current albumin to creatinine ratio  3.  ARF; concern for pre renal v cardio renal    -oliguric   -Omar 9   -creatinine stabilized  4.  Secondary hyperparathyroidism   -vitamin D level pending  5.  Hyperkalemia   -normalized  6.  Cardiomyopathy      No NS infusion today  Continue hold diuretic    Follow labs  Assess daily        Interval History:     Comfortable.  UO seems to be increasing.  Not SOB.  No CP.  Creatinine improved      ROS:     GENERAL: NAD, No fever,chills  R: NEGATIVE for significant cough or SOB  CV: NEGATIVE for chest pain, palpitations  EXT: no change edema  ROS otherwise negative    Medications and Allergies:     Reviewed    Physical Exam:     Vitals were reviewed  Patient Vitals for the past 8 hrs:   BP Temp Temp src Heart Rate Resp SpO2   11/30/17 0931 94/64 - - - - -   11/30/17 0900 (!) 81/53 - - 92 - -   11/30/17 0755 104/69 97.5  F (36.4  C) Oral 85 16 92 %   11/30/17 0628 112/85 97.4  F (36.3  C) - 87 16 97 %     I/O last 3 completed shifts:  In: 784 [P.O.:120; I.V.:664]  Out: 550 [Urine:550]    Vitals:    11/26/17 0507 11/27/17 0147 11/28/17 0530 11/29/17 0641   Weight: 76.5 kg (168 lb 11.2 oz) 76.1 kg (167 lb 12.8 oz) 75.9 kg (167 lb 6 oz) 76.5 kg (168 lb 9.6 oz)    11/30/17 0125   Weight: 77.8 kg (171 lb 8 oz)       GENERAL: awake, alert, follows  HEENT: NC/AT, PERRLA, EOMI, non icteric, pharynx moist without lesion  RESP:  clear anteriorly  CV: RRR, normal  S1 S2  ABDOMEN: soft, nontender, no HSM or masses and bowel sounds normal  MS: no clubbing, cyanosis   SKIN: clear without significant rashes or lesions  NEURO: speech normal and cranial nerves 2-12 intact  PSYCH: affect normal/bright  EXT: trace edema    Data:       Recent Labs  Lab 11/30/17  0615 11/29/17  0550 11/28/17  0615 11/27/17  0600   * 133 133 135   POTASSIUM 4.2 4.6 5.2 4.1   CHLORIDE 98 99 98 100   CO2 21 24 21 26   ANIONGAP 13 10 14 9   GLC 61* 102* 176* 97   BUN 77* 75* 68* 54*   CR 2.28* 2.44* 2.55* 2.09*   GFRESTIMATED 27* 25* 24* 30*   GFRESTBLACK 33* 30* 29* 36*   BERE 8.8 8.7 9.1 8.6         Recent Labs   Lab Test  11/30/17   0615  11/29/17   0550  11/28/17   0615  11/27/17   0600  11/26/17   0512  11/25/17   0545  11/24/17   1054   CR  2.28*  2.44*  2.55*  2.09*  2.33*  2.20*  2.24*       Recent Labs  Lab 11/24/17  1054   ALBUMIN 3.6       Recent Labs  Lab 11/29/17  0550 11/26/17  0512 11/25/17  0545 11/24/17  1054   PHOS 4.6*  --   --   --    HGB  --  11.6* 10.5* 11.7*       Recent Labs  Lab 11/29/17  0550   PTHI 153*       Recent Labs  Lab 11/28/17  1910   UNAR 9       Recent Labs  Lab 11/28/17 1910   UMALCR 18.79*         G Blanco Palomo    [facility] - Nephrology  [phone]

## 2017-11-30 NOTE — PLAN OF CARE
Problem: Patient Care Overview  Goal: Plan of Care/Patient Progress Review  Outcome: No Change  A/O, soft BPs, rest of VSS. Tele afib w/CVR, BBB. A1 w/walker. Denies pain. BGM, insulin per MAR. Hep gtt started, gentle IVF for kidney function. Considering angio if kidney's improve. Cont to monitor.

## 2017-11-30 NOTE — PROGRESS NOTES
[facility] Progress Note  [facility]    F/U visit.  Pt was just getting off the phone with his spouse when SH arrived, and pt subsequently spoke about their marriage and her loving support.  Pt speaks calmly about this hospitalization and hopefully about his eventual DSC back to his AL setting.  Pt welcomed prayer--which SH provided; and cites no current concerns.  SH will visit again after the weekend (per pt's LOS).   Addendum:  spoke w pt's  in the hallway prior to her visit with pt this afternoon.  SH hears that pt's Yarsani/pastors will continue to follow for support.                                                                                                                                           Daniel Pichardo M.Div., [facility]  Staff   Pager [phone]

## 2017-12-01 NOTE — PROGRESS NOTES
Renal Medicine Progress Note                                Dennys Ward MRN# [id number]   Age: 89 year old YOB: 1928   Date of Admission: 11/24/2017 Hospital LOS: 7                  Assessment/Plan:     89-year-old male admitted through the emergency room on 11/24/2017 in the setting of   increasing SOB, FORRESTER, CP and generalized malaise.     Evaluatedt with respect to apparent acute on chronic renal insufficiency      1.  CKD stage III   -creatinine  1.5 mg/dl range   -GFR 40 ml/min range  2.  No microalbuminuria by current albumin to creatinine ratio  3.  ARF; concern for pre renal v cardio renal    -oliguric   -Omar 9   -creatinine stabilized  4.  Secondary hyperparathyroidism   -vitamin D level pending  5.  Hyperkalemia   -normalized  6.  Cardiomyopathy      Continue to hold ARB and diuretic        Interval History:     Comfortable.  Labs stable.  Denies CP or worsening edema      ROS:     GENERAL: NAD, No fever,chills  R: NEGATIVE for significant cough or SOB  CV: NEGATIVE for chest pain, palpitations  EXT: no change edema  ROS otherwise negative    Medications and Allergies:     Reviewed    Physical Exam:     Vitals were reviewed  Patient Vitals for the past 8 hrs:   BP Temp Temp src Heart Rate Resp   12/01/17 1129 111/75 97.3  F (36.3  C) Oral 85 18   12/01/17 0748 94/62 97.4  F (36.3  C) Oral 87 18     I/O last 3 completed shifts:  In: 928 [P.O.:928]  Out: 725 [Urine:725]    Vitals:    11/27/17 0147 11/28/17 0530 11/29/17 0641 11/30/17 0125   Weight: 76.1 kg (167 lb 12.8 oz) 75.9 kg (167 lb 6 oz) 76.5 kg (168 lb 9.6 oz) 77.8 kg (171 lb 8 oz)    12/01/17 0344   Weight: 78.5 kg (173 lb)       GENERAL: awake, alert, follows  HEENT: NC/AT, PERRLA, EOMI, non icteric, pharynx moist without lesion  RESP:  clear anteriorly  CV: RRR, normal S1 S2  ABDOMEN: soft, nontender, no HSM or masses and bowel sounds normal  MS: no clubbing, cyanosis   SKIN: clear without significant rashes or lesions  NEURO:  speech normal and cranial nerves 2-12 intact  PSYCH: affect normal/bright  EXT: trace edema    Data:       Recent Labs  Lab 12/01/17  0544 11/30/17  0615 11/29/17  0550 11/28/17  0615   * 132* 133 133   POTASSIUM 4.3 4.2 4.6 5.2   CHLORIDE 97 98 99 98   CO2 23 21 24 21   ANIONGAP 9 13 10 14   GLC 85 61* 102* 176*   BUN 78* 77* 75* 68*   CR 2.27* 2.28* 2.44* 2.55*   GFRESTIMATED 27* 27* 25* 24*   GFRESTBLACK 33* 33* 30* 29*   BERE 8.8 8.8 8.7 9.1         Recent Labs   Lab Test  12/01/17   0544  11/30/17   0615  11/29/17   0550  11/28/17   0615  11/27/17   0600  11/26/17   0512  11/25/17   0545  11/24/17   1054   CR  2.27*  2.28*  2.44*  2.55*  2.09*  2.33*  2.20*  2.24*     No lab results found in last 7 days.    Recent Labs  Lab 11/29/17  0550 11/26/17  0512 11/25/17  0545   PHOS 4.6*  --   --    HGB  --  11.6* 10.5*       Recent Labs  Lab 11/29/17  0550   PTHI 153*       Recent Labs  Lab 11/28/17 1910   UNAR 9       Recent Labs  Lab 11/28/17 1910   UMALCR 18.79*         G Blanco Palomo    [facility] - [facility]phrology  [phone]

## 2017-12-01 NOTE — PLAN OF CARE
Problem: Cardiac: Heart Failure (Adult)  Goal: Signs and Symptoms of Listed Potential Problems Will be Absent, Minimized or Managed (Cardiac: Heart Failure)  Signs and symptoms of listed potential problems will be absent, minimized or managed by discharge/transition of care (reference Cardiac: Heart Failure (Adult) CPG).   Outcome: No Change  Heart Failure Care Pathway  GOALS TO BE MET BEFORE DISCHARGE:    1. Decrease congestion and/or edema with diuretic therapy to achieve near      optimal volume status.            Dyspnea improved:  No, please explain: still FORRESTER            Edema improved:     Yes        Net I/O and Weights since admission:          11/26 0700 - 12/01 0659  In: 3982 [P.O.:3318; I.V.:664]  Out: 3295 [Urine:3295]  Net: 687            Vitals:    11/24/17 1042 11/25/17 0600 11/26/17 0507 11/27/17 0147   Weight: 78 kg (172 lb) 76.3 kg (168 lb 4.8 oz) 76.5 kg (168 lb 11.2 oz) 76.1 kg (167 lb 12.8 oz)    11/28/17 0530 11/29/17 0641 11/30/17 0125 12/01/17 0344   Weight: 75.9 kg (167 lb 6 oz) 76.5 kg (168 lb 9.6 oz) 77.8 kg (171 lb 8 oz) 78.5 kg (173 lb)       2.  O2 sats > 92% on RA or at prior home O2 therapy level.          Current oxygenation status:       SpO2: 96 %         O2 Device: Nasal cannula,  Oxygen Delivery: 2 LPM         Able to wean O2 this shift to keep sats > 92%:  No, please explain: Pt de-sats on RA       Does patient use Home O2? No    3.  Tolerates ambulation and mobility near baseline: Yes, still weak        How many times did the patient ambulate with nursing staff this shift? 1    Please review the Heart Failure Care Pathway for additional HF goal outcomes.    Raya Givens RN  12/1/2017

## 2017-12-01 NOTE — PROGRESS NOTES
[facility]    Hospitalist Progress Note    Date of Service (when I saw the patient): 12/01/2017    Assessment & Plan   Dennys Ward is a 89 year old male with hx of DM1, chronic a-fib on chronic anticoagulation with warfarin, CKD, hx of DVT, and prior CVA who presented on 11/24/2017 with chest pain and dyspnea on exertion, and was admitted for NSTEMI and cardiogenic shock with new diagnosis of CHF with EF <20%.    Advanced care planning  Discussed goals of care at length with patient, his son Cj, and his daughter Brenda on 12/1. Discussed new diagnoses of NSTEMI and CHF, and limitation of management in light of his age and co-morbidities. Discussed code status and very low likelihood  meaningful recovery. Following this discussion, the patient's code status was changed to DNR/DNI. He is open to hospice, and would to explore this with the hospice SW with his son present and daughter phoned in.   - Plan for now is restorative short of intubation/CPR, pending hospice meeting.   - Hospice SW consult placed    NSTEMI  Presented with dyspnea on exertion and chest heaviness. EKG on arrival showed Q waves in the inferior leads with no previous EKG to compare. Initial troponin 3.593 and peaked at 7.231. TTE on arrival showed LVEF <20% with severe global hypokinesis and minor regional variation. In the ED, he was not initiated on a heparin gtt because he was anticoagulated with warfarin. Cardiology was consulted on admission and felt that risks of coronary angiogram outweighed benefit, and recommended medical management with plan to reassess candidacy an an outpatient if renal function were to improve and patient felt strongly about intervention.   - Continues on PTA Plavix (aspirin allergy)    Cardiogenic shock  Acute systolic CHF exacerbation, new diagnosis  Mild to moderate mitral regurgitation  TTE on arrival showed LVEF <20% with severe global hypokinesis and minor regional variation, no  severe valvular pathology. He has no previous diagnosis of CHF. Prior TTE in Nov 2016 showed LVEF 55%. Associated hypotension, elevated lactic acid, and MORIAH also present on arrival. Ddx for cardiomyopathy include ischemia, stress cardiomyopathy, and viral myocarditis given recent URI sx.  - He has been started on Imdur 10 mg TID and hydralazine 20 mg TID  - Holding ACE/ARB, spironolactone, and Lasix due to MORIAH    Chronic atrial fibrillation  Auto rate-controlled. Maintained on warfarin for CHADS-VASc score 8. Warfarin was initially held on admission for angiogram, but has since been resumed.  - Continues on warfarin per PharmD dosing, bridging with heparin gtt    Essential hypertension  [PTA: Lasix 20 mg po daily, losartan 100 mg daily]  - Holding Lasix and losartan due to borderline blood pressures and MORIAH    Oliguric MORIAH on CKD stage III, Improved  Creatinine up to 2.55 from baseline 1.2-1.6. Attributed to hypoperfusion from CHF/NSTEMI, losartan, and Lasix vs cardiorenal syndrome also possible. Renal US (11/25) normal.  - Cr has appeared to plateau in the 2.2-2.3 range    Hypersensitivity gag reflex  Dyspepsia   Ongoing issues during this hospitalization with heightened gag reflex causing episodes of dry heaves and vomiting. Reports this has been present mildly for 2 months, worsened over previous 1-2 weeks and seemingly associated with post-nasal driop. Oropharyngeal exam normal. Video swallow study (11/29) normal.  - He has been started on fluticasone nasal spray and ranitidine with improvement    Diarrhea, Improving  Suspect antibiotic-associated from pip-tazo administered on arrival for possible sepsis. 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 Blood sugars were initially labile on his PTA regimen, but stabilized with initiation of Lantus  - Blood sugars  in last 24  "hours  - He has been started on Lantus 20 units daily, aspart 1u/carb unit TID AC  - Medium SSI available.    History of DVT  Maintained on warfarin which has been continued    FEN: mod CHO, 2 g sodium diet  DVT Prophylaxis: Heparin gtt and Warfarin  Code Status: DNR/DNI    Disposition: Expected discharge pending hospice meeting, TCU vs hospice facility possibly tomorrow    Jocelyne Acosta    Interval History   No events overnight. Feels somewhat \"flushed,\" but offers no other concerns. Continues to have dyspnea on exertion with +orthostatics. Discussed goals of care at length with patient, his son, and his daughter. Details noted above  - Hospice SW consult  - Code status changed to DNR/DNI  - Continue restorative therapies for now, pending hospice meeting    -Data reviewed today: I reviewed all new labs and imaging results over the last 24 hours. I personally reviewed no images or EKG's today.    Physical Exam   Temp: 97.4  F (36.3  C) Temp src: Oral BP: 94/62 Pulse: 78 Heart Rate: 87 Resp: 18 SpO2: 96 % O2 Device: Nasal cannula Oxygen Delivery: 2 LPM  Vitals:    11/29/17 0641 11/30/17 0125 12/01/17 0344   Weight: 76.5 kg (168 lb 9.6 oz) 77.8 kg (171 lb 8 oz) 78.5 kg (173 lb)     Vital Signs with Ranges  Temp:  [96  F (35.6  C)-97.7  F (36.5  C)] 97.4  F (36.3  C)  Pulse:  [78] 78  Heart Rate:  [] 87  Resp:  [16-18] 18  BP: ()/(50-87) 94/62  SpO2:  [93 %-97 %] 96 %  I/O last 3 completed shifts:  In: 928 [P.O.:928]  Out: 725 [Urine:725]    Constitutional: Appears comfortable, NAD  Respiratory: Breathing non-labored. Decreased breath sounds over bases bilaterally  Cardiovascular: Irregular rhythm, normal rate. Trace pedal edema  GI: +BS, abd soft/NT  Skin/Integumen: No rash  Neuro: Alert and appropriate, ALTMAN  Psych: Calm and cooperative    Medications     Warfarin Therapy Reminder       HEParin 600 Units/hr (12/01/17 0720)     IV infusion builder WITH additives 50 mL/hr at 11/29/17 1449     - " MEDICATION INSTRUCTIONS -         ranitidine  150 mg Oral Q24H     fluticasone  1 spray Both Nostrils Daily     isosorbide dinitrate  10 mg Oral TID     insulin glargine  20 Units Subcutaneous Q24H     insulin aspart   Subcutaneous TID AC     hydrALAZINE  20 mg Oral Q8H RUBEN     clopidogrel  75 mg Oral Daily     insulin aspart  1-7 Units Subcutaneous TID AC     insulin aspart  1-5 Units Subcutaneous At Bedtime     Data     Recent Labs  Lab 12/01/17  0544 11/30/17  0615 11/29/17  0550  11/26/17  0512 11/25/17  2346 11/25/17  1822  11/25/17  0545   WBC  --   --   --   --  10.5  --   --   --  9.0   HGB  --   --   --   --  11.6*  --   --   --  10.5*   MCV  --   --   --   --  91  --   --   --  91   PLT  --   --   --   --  245  --   --   --  239   INR 2.02* 1.86* 1.83*  < > 3.69*  --   --   --  6.67*   * 132* 133  < > 136  --   --   --  136   POTASSIUM 4.3 4.2 4.6  < > 4.5  --   --   --  5.1   CHLORIDE 97 98 99  < > 101  --   --   --  101   CO2 23 21 24  < > 26  --   --   --  26   BUN 78* 77* 75*  < > 63*  --   --   --  72*   CR 2.27* 2.28* 2.44*  < > 2.33*  --   --   --  2.20*   ANIONGAP 9 13 10  < > 9  --   --   --  9   BERE 8.8 8.8 8.7  < > 8.6  --   --   --  8.4*   GLC 85 61* 102*  < > 63*  --   --   --  79   TROPI  --   --   --   --  4.419* 4.134* 4.665*  < > 7.231*   < > = values in this interval not displayed.    No results found for this or any previous visit (from the past 24 hour(s)).

## 2017-12-01 NOTE — PLAN OF CARE
"Problem: Patient Care Overview  Goal: Plan of Care/Patient Progress Review  OT: Two attempts to see pt this morning of OT intervention. Pt initially with MD for visit. Upon return, pt declined OT stating \"I just found out I'm dying and I think that's all I can take today.\" Pt appreciative of attempt, but requesting to wait until tomorrow with further OT.       "

## 2017-12-01 NOTE — PROGRESS NOTES
"BRIEF NUTRITION ASSESSMENT      REASON FOR ASSESSMENT:  LOS    NUTRITION HISTORY:  Pt follows a regular diet at home with no allergies, intolerances or restrictions. States that his appetite and intake have been \"okay.\" He largely consumes softer foods due to trouble chewing and swallowing. Typically consumes 1/2 of his meals at baseline. Does not believe he has lost any weight and denies any major changes in intake prior to admission.     CURRENT DIET AND INTAKE:  Diet:  Mod CHO, 2g NA              50-75% intake of most meals; pt states this is his baseline. Upon interview he is eating soup, yogurt and fruit.     ANTHROPOMETRICS:  Height: 5' 8\"  Weight: 173 lbs 0 oz(78.5 kg)  BMI: 26.30 kg/m2  IBW:  70 kg  Weight Status: Overweight BMI 25-29.9  %IBW: 112%  Weight History: Pt denies any changes in weight recently. No history in [facility] system. Per review of Care Everywhere pt's weight was 78.9 kg ~ 6 weeks ago. Do not suspect any recent weight loss    LABS:  Labs noted    MALNUTRITION:  Patient does not meet two of the following criteria necessary for diagnosing malnutrition: significant weight loss, reduced intake, subcutaneous fat loss, muscle loss or fluid retention    NUTRITION INTERVENTION:  Nutrition Diagnosis:  No nutrition diagnosis at this time.    Implementation:  Nutrition Education:Per Provider order if indicated    FOLLOW UP/MONITORING:   Will re-evaluate in 7 - 10 days, or sooner, if re-consulted.    Salima Mckeon RD, LD  Clinical Dietitian        "

## 2017-12-01 NOTE — PLAN OF CARE
Problem: Cardiac: Heart Failure (Adult)  Goal: Signs and Symptoms of Listed Potential Problems Will be Absent, Minimized or Managed (Cardiac: Heart Failure)  Signs and symptoms of listed potential problems will be absent, minimized or managed by discharge/transition of care (reference Cardiac: Heart Failure (Adult) CPG).   Outcome: No Change  Heart Failure Care Pathway  GOALS TO BE MET BEFORE DISCHARGE:    1. Decrease congestion and/or edema with diuretic therapy to achieve near      optimal volume status.            Dyspnea improved:  {Yes            Edema improved:     {Yes        Net I/O and Weights since admission:          11/25 1500 - 11/30 1459  In: 4008 [P.O.:3344; I.V.:664]  Out: 3295 [Urine:3295]  Net: 713            Vitals:    11/24/17 1042 11/25/17 0600 11/26/17 0507 11/27/17 0147   Weight: 78 kg (172 lb) 76.3 kg (168 lb 4.8 oz) 76.5 kg (168 lb 11.2 oz) 76.1 kg (167 lb 12.8 oz)    11/28/17 0530 11/29/17 0641 11/30/17 0125   Weight: 75.9 kg (167 lb 6 oz) 76.5 kg (168 lb 9.6 oz) 77.8 kg (171 lb 8 oz)       2.  O2 sats > 92% on RA or at prior home O2 therapy level.          Current oxygenation status:       SpO2: 97 %         O2 Device: Nasal cannula,  Oxygen Delivery: 2 LPM         Able to wean O2 this shift to keep sats > 92%:  No       Does patient use Home O2? No    3.  Tolerates ambulation and mobility near baseline: No        How many times did the patient ambulate with nursing staff this shift? 1     Please review the Heart Failure Care Pathway for additional HF goal outcomes.    Vikki Swan RN  11/30/2017   A/o 2LPM NC. VSS BP improved. TELE Afib CVR. Heparin IVF. Angina with activity.  Cons-carb diet. Minimal intake. Dinner time insulin given per protocol. Pt declined HS insulin. BLE edematous, minesh. Lactic acid elevated MD aware, no interventions. D/c pending progress.

## 2017-12-01 NOTE — PLAN OF CARE
Problem: Patient Care Overview  Goal: Plan of Care/Patient Progress Review  PT: Order received; Patient had meeting with MD and patient and family to meet with Hospice; Will plan to check status when goals of care are further determined.

## 2017-12-01 NOTE — PROGRESS NOTES
[facility]: Met with patient and son Cj to explain the hospice program. Pt has a diagnosis of acute systolic CHF, NSTEMI, afib, moderate mitral regurgitation, CKD 4 and hx of DVT. Pt is living at the [facility] at [facility] in [place] with his spouse. With all the information the pt and son have been given today they are trying to make a decision. They want to discuss all with the rest of the family. Pts daughter will be coming from [place] tomorrow so they will have a chance to all convene and discuss the options.   I explained the hospice philosophy of care. The pt doesn't think therapy will be helpful at this point and is looking at a comfort approach at discharge at least as of today in this discussion.  I explained the medicare benefit, the services available for support, medication and equipment coverage. I explained how the hospice team will be an added extra layer of support and care for the patient at the nursing home. Since the patient is already a resident of [facility] there is a bed available for the patient in the TCU area for hospice care. He is leaning towards this.   We did not sign any hospice consent forms at this visit.   If pt decides to be discharged on hospice services these are the medication recommendations:  1.Hydromorphone 10mg/ml, 1 mg or 0.1 ML PO/SL every 2 hours PRN pain/dyspnea  2.Atropine 1% 2 drops PO/SL every 2 hours PRN terminal congestion  3.Bisacodyl supp 10mg OH daily PRN constipation  4.Acetaminophen supp 650mg OH every 4 hours PRN fever/mild pain  5.Senna 8.6mg one tab po 2x day as needed for constipation.  No ranges and bubble packaging required for the facility.   A POLST will need to be completed if possible.   Left the pt and son with the hospice brochure and the hospice information sheet along with the 24 hr number for questions. Margo Boswell  is aware of this plan of care. Thank you for the referral. Myrna Shepherd RN, BSN    Hospice Admission nurse  [phone]

## 2017-12-01 NOTE — PROGRESS NOTES
[facility]    Sepsis Evaluation Progress Note    Date of Service: 11/30/2017    I was called to see Dennys Ward due to abnormal vital signs triggering the Sepsis SIRS screening alert. He is not known to have an infection.     Physical Exam    Vital Signs:  Temp: 97.6  F (36.4  C) Temp src: Oral BP: 116/60   Heart Rate: 120 Resp: 16 SpO2: 93 % O2 Device: None (Room air)      Lab:  Lactic Acid   Date Value Ref Range Status   11/30/2017 4.9 (HH) 0.7 - 2.0 mmol/L Final     Comment:     Critical Value called to and read back by  ALEC ETIENNE IN [facility] AT 1647 MK         The patient is at baseline mental status.    Assessment and Plan    The SIRS and exam findings are likely due to cardiogenic shock, there is no sign of sepsis at this time.    Disposition: The patient will remain on the current unit. We will continue to monitor this patient closely.    Jocelyne Acosta MD

## 2017-12-01 NOTE — PLAN OF CARE
Problem: Patient Care Overview  Goal: Plan of Care/Patient Progress Review  Outcome: No Change  AOx4, VSS, Tele Afib CVR with a HR of 88, up with A1-2 and walker and gait belt. No c/o CP or pain. MD discussing hospice with patient and family. Family is on board but patient is reluctant. CTM.

## 2017-12-01 NOTE — PLAN OF CARE
Problem: Cardiac: Heart Failure (Adult)  Goal: Signs and Symptoms of Listed Potential Problems Will be Absent, Minimized or Managed (Cardiac: Heart Failure)  Signs and symptoms of listed potential problems will be absent, minimized or managed by discharge/transition of care (reference Cardiac: Heart Failure (Adult) CPG).   Outcome: No Change  Heart Failure Care Pathway  GOALS TO BE MET BEFORE DISCHARGE:    1. Decrease congestion and/or edema with diuretic therapy to achieve near      optimal volume status.            Dyspnea improved:  No real change from yesterday per pt; continues to get somewhat dyspneic with activity            Edema improved:     Yes        Net I/O and Weights since admission:          11/25 2300 - 11/30 2259  In: 4168 [P.O.:3504; I.V.:664]  Out: 3170 [Urine:3170]  Net: 998            Vitals:    11/24/17 1042 11/25/17 0600 11/26/17 0507 11/27/17 0147   Weight: 78 kg (172 lb) 76.3 kg (168 lb 4.8 oz) 76.5 kg (168 lb 11.2 oz) 76.1 kg (167 lb 12.8 oz)    11/28/17 0530 11/29/17 0641 11/30/17 0125 12/01/17 0344   Weight: 75.9 kg (167 lb 6 oz) 76.5 kg (168 lb 9.6 oz) 77.8 kg (171 lb 8 oz) 78.5 kg (173 lb)       2.  O2 sats > 92% on RA or at prior home O2 therapy level.          Current oxygenation status:       SpO2: 96 %         O2 Device: Nasal cannula,  Oxygen Delivery: 2 LPM         Able to wean O2 this shift to keep sats > 92%:  No; pt continues to be dyspneic on exertion.       Does patient use Home O2? No    3.  Tolerates ambulation and mobility near baseline: Difficult to assess; pt with minimal activity overnight        How many times did the patient ambulate with nursing staff this shift? 0    Please review the Heart Failure Care Pathway for additional HF goal outcomes.    AVSS; tele A-fib with CVR; voiding in small amounts; up with 2 standing at bedside to void; pt denying pain; appeared to sleep most of the night; continue to monitor.     Rachelle Eugene RN  12/1/2017

## 2017-12-01 NOTE — PROGRESS NOTES
Care Transition Initial Assessment -   Reason For Consult: discharge planning  Met with: Patient and Family  (son Cj)  Active Problems:    NSTEMI (non-ST elevated myocardial infarction) (H)         DATA  Lives With: spouse [facility]  Living Arrangements: assisted living ([facility])  Description of Support System: Supportive, Involved  Who is your support system?: Children, Wife  Support Assessment: Adequate family and caregiver support.   Identified issues/concerns regarding health management:       Quality Of Family Relationships: supportive, involved  Transportation Available: car    Per social service protocol for discharge planning.  Patient was admitted on 11-24-17 with a n stemi.  The tentative date of discharge is yet to be determined.  Reviewed chart and spoke with patient and son Cj regarding discharge plans.  Per patient and son report, patient lives with his wife in an apartment at [facility] on the [facility].  Patient has a bath bench in the bathroom.  Patient states that his wife is unable to assist.  Spoke with patient's MD who has entered a hospice consult.  Patient and son have no preference as to hospice agency.  Referral to [facility].  Myrna can meet with patient and son around 14:00.  Answered questions about the private pay costs at [facility] for hospice care.  They require $3,000 up front in addition to the $36 per day amenity fee.  Patient was asking questions about how that would affect his wife as she still would have to pay rent.  Explained how medical assistance works and strongly encouraged them to contact an elder law  to discuss further.  Patient states he would like to discuss hospice further with his wife and his daughter.  Patient's son states that his sister is coming from [place].  Patient's son states Sunday would be the earliest before he makes any decisions.  Explained that [facility] can accept him.   Also discussed transport options.  Will follow up with patient and son over the weekend.  Tentatively set up a [facility] intake with patient on Monday at 15:30 so their schedule does not fill up.  Will cancel this is need be.  Updated [facility] as to this information.      ASSESSMENT  Cognitive Status:  awake and alert  Concerns to be addressed: discharge planning/hospice.     PLAN  Financial costs for the patient includes private pay hospice costs and transportation costs.  Patient given options and choices for discharge hospice versus tcu and where.  Patient/family is agreeable to the plan?  Yes  Patient Goals and Preferences: TBD.  Patient anticipates discharging to:  [facility].    Will continue to follow and assist with a safe discharge plan.    CHECO [place], [facility]  [phone]

## 2017-12-02 NOTE — PROGRESS NOTES
Renal Medicine Chart Check      Labs stable  Disposition pending    Holding diuretic and ARB    Will see again 12/04/17        Recent Labs  Lab 12/02/17  0605   *   POTASSIUM 4.6   CHLORIDE 95   CO2 24   ANIONGAP 11   GLC 75   BUN 77*   CR 2.30*   GFRESTIMATED 27*   GFRESTBLACK 33*   BERE 8.6     Recent Labs   Lab Test  12/02/17   0605  12/01/17   0544  11/30/17   0615  11/29/17   0550  11/28/17   0615  11/27/17   0600  11/26/17   0512  11/25/17   0545  11/24/17   1054   CR  2.30*  2.27*  2.28*  2.44*  2.55*  2.09*  2.33*  2.20*  2.24*           BRIGDER Palomo    [facility]  [phone]

## 2017-12-02 NOTE — PLAN OF CARE
Problem: Patient Care Overview  Goal: Plan of Care/Patient Progress Review  Discharge Planner SLP   Patient plan for discharge: none stated  Current status: Pt tolerated thin liquids via cup with no overt s/sx of aspiration. Pureed textures resulted in delayed coughing/belching. Findings consistent with VFSS completed 11/29 which demonstrated no penetration or aspiration across any textures despite pts coughing/belching. Recommend continue regular textures (soft choices) and thin liquids. Pt would benefit from GI consult to assess etiology of coughing/belching with PO given normal oropharyngeal swallow.   Barriers to return to prior living situation: none from ST standpoint  Recommendations for discharge: GI consul; no further ST needs  Rationale for recommendations: pts oropharyngeal swallow WFL as demonstrated on VFSS; further GI workup recommended to determine etiology of coughing/belching with PO.       Entered by: Ivonne Avila 12/02/2017 1:47 PM     Speech Language Therapy Discharge Summary    Reason for therapy discharge:    All goals and outcomes met, no further needs identified.    Progress towards therapy goal(s). See goals on Care Plan in [facility] electronic health record for goal details.  Goals met    Therapy recommendation(s):    No further therapy is recommended.

## 2017-12-02 NOTE — PLAN OF CARE
"Problem: Patient Care Overview  Goal: Plan of Care/Patient Progress Review  PT: Per Hospice note: \"The pt doesn't think therapy will be helpful at this point and is looking at a comfort approach at discharge at least as of today in this discussion.\"   Pt is currently being seen by OT, should PT needs arise please re-consult.  PT order completed at this time.         "

## 2017-12-02 NOTE — PLAN OF CARE
Problem: Cardiac: Heart Failure (Adult)  Goal: Signs and Symptoms of Listed Potential Problems Will be Absent, Minimized or Managed (Cardiac: Heart Failure)  Signs and symptoms of listed potential problems will be absent, minimized or managed by discharge/transition of care (reference Cardiac: Heart Failure (Adult) CPG).   Outcome: No Change  Pt A&O, forgetful. Reported of SOB w/ activity, placed on 1.5lit O2 thru NC during the night, states breathing better. Up to edge of the bed w/ A-1 and walker , pt is incontinence of urine at times. Afib w/ CVR on tele. VSS. Possible d/c to Hospice this weekend. Will continue to monitor pt.

## 2017-12-02 NOTE — PLAN OF CARE
Problem: Patient Care Overview  Goal: Plan of Care/Patient Progress Review  OT: Attempted intervention; however, pt currently receiving nursing cares.

## 2017-12-02 NOTE — PROGRESS NOTES
[facility]    Hospitalist Progress Note    Date of Service (when I saw the patient): 12/02/2017    Assessment & Plan   Dennys Ward is a 89 year old male with hx of DM1, chronic a-fib on chronic anticoagulation with warfarin, CKD, hx of DVT, and prior CVA who presented on 11/24/2017 with chest pain and dyspnea on exertion, and was admitted for NSTEMI and cardiogenic shock with new diagnosis of CHF with EF <20%.    Advanced care planning  Discussed goals of care at length with patient, his son Cj, and his daughter Brenda on 12/1. Discussed new diagnoses of NSTEMI and CHF, and limitation of management in light of his age and co-morbidities. Discussed code status and very low likelihood  meaningful recovery. Following this discussion, the patient's code status was changed to DNR/DNI. He is open to hospice, and would to explore this with the hospice SW with his son present and daughter phoned in.   - Patient still thinking about hospice. Plan for now is restorative short of intubation/CPR    NSTEMI  Presented with dyspnea on exertion and chest heaviness. EKG on arrival showed Q waves in the inferior leads with no previous EKG to compare. Initial troponin 3.593 and peaked at 7.231. TTE on arrival showed LVEF <20% with severe global hypokinesis and minor regional variation. In the ED, he was not initiated on a heparin gtt because he was anticoagulated with warfarin. Cardiology was consulted on admission and felt that risks of coronary angiogram outweighed benefit, and recommended medical management with plan to reassess candidacy an an outpatient if renal function were to improve and patient felt strongly about intervention.   - Continues on PTA Plavix (aspirin allergy)    Cardiogenic shock  Acute systolic CHF exacerbation, new diagnosis  Mild to moderate mitral regurgitation  TTE on arrival showed LVEF <20% with severe global hypokinesis and minor regional variation, no severe valvular  pathology. He has no previous diagnosis of CHF. Prior TTE in Nov 2016 showed LVEF 55%. Associated hypotension, elevated lactic acid, and MORIAH also present on arrival. Ddx for cardiomyopathy include ischemia, stress cardiomyopathy, and viral myocarditis given recent URI sx.  - He has been started on Imdur 10 mg TID and hydralazine 20 mg TID  - Holding ACE/ARB, spironolactone, and Lasix due to MORIAH    Chronic atrial fibrillation  Auto rate-controlled. Maintained on warfarin for CHADS-VASc score 8. Warfarin was initially held on admission for angiogram, but has since been resumed.  - Continues on warfarin per PharmD dosing, bridging with heparin gtt    Essential hypertension  [PTA: Lasix 20 mg po daily, losartan 100 mg daily]  - Holding Lasix and losartan due to borderline blood pressures and MORIAH    Oliguric MORIAH on CKD stage III, Improved  Creatinine up to 2.55 from baseline 1.2-1.6. Attributed to hypoperfusion from CHF/NSTEMI, losartan, and Lasix vs cardiorenal syndrome also possible. Renal US (11/25) normal.  - Cr has appeared to plateau in the 2.2-2.3 range    Hypersensitivity gag reflex  Dyspepsia   Ongoing issues during this hospitalization with heightened gag reflex causing episodes of dry heaves and vomiting. Reports this has been present mildly for 2 months, worsened over previous 1-2 weeks and seemingly associated with post-nasal driop. Oropharyngeal exam normal. Video swallow study (11/29) normal.  - He has been started on fluticasone nasal spray and ranitidine with improvement    Diarrhea, Improving  Suspect antibiotic-associated from pip-tazo administered on arrival for possible sepsis. 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 Blood sugars were initially labile on his PTA regimen, but stabilized with initiation of Lantus  - Blood sugars  in last 24 hours  - He has  "been started on Lantus 20 units daily, aspart 1u/carb unit TID AC  - Medium SSI available.    History of DVT  Maintained on warfarin which has been continued    FEN: mod CHO, 2 g sodium diet  DVT Prophylaxis: Heparin gtt and Warfarin  Code Status: DNR/DNI    Disposition: Expected discharge pending decision on hospice, Sun vs Mon    [facility]    Interval History   No events overnight. Feels \"thick in the head,\" but denies dizziness/lightheadedness. Denies chest pain. Continues to orthostatic and dyspneic on exertion. Awaiting arrival of his daughter from [place] before he decides on hospice.  - No change in care plan by me    -Data reviewed today: I reviewed all new labs and imaging results over the last 24 hours. I personally reviewed no images or EKG's today.    Physical Exam   Temp: 98  F (36.7  C) Temp src: Oral BP: (!) 85/54   Heart Rate: 73 Resp: 18 SpO2: 96 % O2 Device: None (Room air) Oxygen Delivery: 1 LPM  Vitals:    11/30/17 0125 12/01/17 0344 12/02/17 0212   Weight: 77.8 kg (171 lb 8 oz) 78.5 kg (173 lb) 79.3 kg (174 lb 12.8 oz)     Vital Signs with Ranges  Temp:  [97.3  F (36.3  C)-98  F (36.7  C)] 98  F (36.7  C)  Heart Rate:  [73-97] 73  Resp:  [16-18] 18  BP: ()/(42-75) 85/54  SpO2:  [94 %-96 %] 96 %  I/O last 3 completed shifts:  In: 850 [P.O.:850]  Out: 225 [Urine:225]    Constitutional: Appears comfortable, NAD  Respiratory: Breathing non-labored. Diminished breath sounds bilaterally  Cardiovascular: Irregular rhythm, normal rate. 1+ BLE edema  GI: +BS, abd soft/NT  Skin/Integumen: No rash  Neuro: Alert and appropriate, ALTMAN  Psych: Calm and cooperative    Medications     Warfarin Therapy Reminder       IV infusion builder WITH additives 50 mL/hr at 11/29/17 6592     - MEDICATION INSTRUCTIONS -         ranitidine  150 mg Oral Q24H     fluticasone  1 spray Both Nostrils Daily     isosorbide dinitrate  10 mg Oral TID     insulin glargine  20 Units Subcutaneous Q24H     insulin aspart  "  Subcutaneous TID AC     hydrALAZINE  20 mg Oral Q8H [facility]     clopidogrel  75 mg Oral Daily     insulin aspart  1-7 Units Subcutaneous TID AC     insulin aspart  1-5 Units Subcutaneous At Bedtime     Data     Recent Labs  Lab 12/02/17  0605 12/01/17  0544 11/30/17  0615  11/26/17  0512 11/25/17  2346 11/25/17  1822   WBC  --   --   --   --  10.5  --   --    HGB  --   --   --   --  11.6*  --   --    MCV  --   --   --   --  91  --   --    PLT  --   --   --   --  245  --   --    INR 2.69* 2.02* 1.86*  < > 3.69*  --   --    * 129* 132*  < > 136  --   --    POTASSIUM 4.6 4.3 4.2  < > 4.5  --   --    CHLORIDE 95 97 98  < > 101  --   --    CO2 24 23 21  < > 26  --   --    BUN 77* 78* 77*  < > 63*  --   --    CR 2.30* 2.27* 2.28*  < > 2.33*  --   --    ANIONGAP 11 9 13  < > 9  --   --    BERE 8.6 8.8 8.8  < > 8.6  --   --    GLC 75 85 61*  < > 63*  --   --    TROPI  --   --   --   --  4.419* 4.134* 4.665*   < > = values in this interval not displayed.    No results found for this or any previous visit (from the past 24 hour(s)).

## 2017-12-02 NOTE — PLAN OF CARE
Problem: Patient Care Overview  Goal: Plan of Care/Patient Progress Review  Outcome: no change    Patient A&O x4,  Denies chest pain/tightness/pressure or SOB,  on tele A-fib CVR and VSS,  Lungs are wheezy and diminished at bases and on RA, up with assist of 1 and RW , on cardiac. CHO diet, skin is bruised , voiding well and last BM 11/30. IV is in L arm #22 and SL. Plan for Hospice to meet with family.

## 2017-12-02 NOTE — PLAN OF CARE
Problem: Patient Care Overview  Goal: Plan of Care/Patient Progress Review  Heart Failure Care Pathway  GOALS TO BE MET BEFORE DISCHARGE:    1. Decrease congestion and/or edema with diuretic therapy to achieve near      optimal volume status.            Dyspnea improved:  No, please explain: FORRESTER, extended time to recover            Edema improved:     No, please explain: no change        Net I/O and Weights since admission:          11/27 0700 - 12/02 0659  In: 3902 [P.O.:3238; I.V.:664]  Out: 2245 [Urine:2245]  Net: 1657            Vitals:    11/24/17 1042 11/25/17 0600 11/26/17 0507 11/27/17 0147   Weight: 78 kg (172 lb) 76.3 kg (168 lb 4.8 oz) 76.5 kg (168 lb 11.2 oz) 76.1 kg (167 lb 12.8 oz)    11/28/17 0530 11/29/17 0641 11/30/17 0125 12/01/17 0344   Weight: 75.9 kg (167 lb 6 oz) 76.5 kg (168 lb 9.6 oz) 77.8 kg (171 lb 8 oz) 78.5 kg (173 lb)    12/02/17 0212   Weight: 79.3 kg (174 lb 12.8 oz)       2.  O2 sats > 92% on RA or at prior home O2 therapy level.          Current oxygenation status:       SpO2: 96 %         O2 Device: None (Room air),  Oxygen Delivery: 1 LPM         Able to wean O2 this shift to keep sats > 92%:  Yes       Does patient use Home O2? No    3.  Tolerates ambulation and mobility near baseline: No, please explain: too weak to move further than chair        How many times did the patient ambulate with nursing staff this shift? 0    Please review the Heart Failure Care Pathway for additional HF goal outcomes.    Pt a/o x 4, lethargic. Soft BP otherwise VSS. Tele was a fib controlled. Denies pain. Pt has BS checks, no correction d/t poor appetite. Speech signed off this afternoon. Waiting on pt to make final say on hospice care. Not eating a cardiac diet no caffeine. Up with A1 to the chair.   Rodrigo Monsalve RN  12/2/2017

## 2017-12-03 NOTE — PLAN OF CARE
Problem: Patient Care Overview  Goal: Plan of Care/Patient Progress Review  Outcome: No Change  A&O, forgetful. BP soft, all other VSS on room air. Up with assist x1 with walker. Patient denied pain. Bilateral lower extremity edema +2. Lung sounds diminished, dyspnea on exertion. Blood glucose 71 and 169 - held sliding scale insulin due to poor appetite, lantus dose decreased. Tele monitored afib with CVR. Plan to discharge tomorrow, likely with residential hospice (to be decided by patient and family).

## 2017-12-03 NOTE — PROVIDER NOTIFICATION
Spoke with Dr. Acosta 12/03/17 3:49 PM regarding pt complaint of chest pressure with dizziness, non-radiating, VSS. EKG obtained but pain resolved quickly after pt repositioned and belched. Symptoms currently resolved. MD acknowledged.

## 2017-12-03 NOTE — PLAN OF CARE
Problem: Patient Care Overview  Goal: Plan of Care/Patient Progress Review  OT: Attempted intervention x2. Pt on the phone and requesting OT to wait at this time. Due to pt's limited tolerance and participation in OT, decreased frequency to 3x/week.

## 2017-12-03 NOTE — PLAN OF CARE
Problem: Cardiac: Heart Failure (Adult)  Goal: Signs and Symptoms of Listed Potential Problems Will be Absent, Minimized or Managed (Cardiac: Heart Failure)  Signs and symptoms of listed potential problems will be absent, minimized or managed by discharge/transition of care (reference Cardiac: Heart Failure (Adult) CPG).   Outcome: No Change  Heart Failure Care Pathway  GOALS TO BE MET BEFORE DISCHARGE:    1. Decrease congestion and/or edema with diuretic therapy to achieve near      optimal volume status.            Dyspnea improved:  No, please explain: unchanged            Edema improved:     No, please explain: unchanged        Net I/O and Weights since admission:          11/27 1500 - 12/02 1459  In: 3812 [P.O.:3148; I.V.:664]  Out: 2445 [Urine:2445]  Net: 1367            Vitals:    11/24/17 1042 11/25/17 0600 11/26/17 0507 11/27/17 0147   Weight: 78 kg (172 lb) 76.3 kg (168 lb 4.8 oz) 76.5 kg (168 lb 11.2 oz) 76.1 kg (167 lb 12.8 oz)    11/28/17 0530 11/29/17 0641 11/30/17 0125 12/01/17 0344   Weight: 75.9 kg (167 lb 6 oz) 76.5 kg (168 lb 9.6 oz) 77.8 kg (171 lb 8 oz) 78.5 kg (173 lb)    12/02/17 0212   Weight: 79.3 kg (174 lb 12.8 oz)       2.  O2 sats > 92% on RA or at prior home O2 therapy level.          Current oxygenation status:       SpO2: 97 %         O2 Device: None (Room air),  Oxygen Delivery: 1 LPM         Able to wean O2 this shift to keep sats > 92%:  Yes       Does patient use Home O2? No    3.  Tolerates ambulation and mobility near baseline: No, please explain: pt frustrated regarding decreased activity tolerance        How many times did the patient ambulate with nursing staff this shift? Pt up at bedside to urinate, refused ambulation and chair    Please review the Heart Failure Care Pathway for additional HF goal outcomes.    A/O, VSS, O2sats 97% on RA. Tele afib CVR. Poor PO intake d/t no appetite although able to eat some soup.     Elana Esteban RN  12/2/2017

## 2017-12-03 NOTE — PLAN OF CARE
Problem: Patient Care Overview  Goal: Plan of Care/Patient Progress Review  Outcome: No Change  Heart Failure Care Pathway  GOALS TO BE MET BEFORE DISCHARGE:    1. Decrease congestion and/or edema with diuretic therapy to achieve near      optimal volume status.            Dyspnea improved:  Yes            Edema improved:     Yes        Net I/O and Weights since admission:          11/27 2300 - 12/02 2259  In: 3702 [P.O.:3038; I.V.:664]  Out: 2520 [Urine:2520]  Net: 1182            Vitals:    11/24/17 1042 11/25/17 0600 11/26/17 0507 11/27/17 0147   Weight: 78 kg (172 lb) 76.3 kg (168 lb 4.8 oz) 76.5 kg (168 lb 11.2 oz) 76.1 kg (167 lb 12.8 oz)    11/28/17 0530 11/29/17 0641 11/30/17 0125 12/01/17 0344   Weight: 75.9 kg (167 lb 6 oz) 76.5 kg (168 lb 9.6 oz) 77.8 kg (171 lb 8 oz) 78.5 kg (173 lb)    12/02/17 0212   Weight: 79.3 kg (174 lb 12.8 oz)       2.  O2 sats > 92% on RA or at prior home O2 therapy level.          Current oxygenation status:       SpO2: 95 %         O2 Device: None (Room air),         Able to wean O2 this shift to keep sats > 92%:  Yes       Does patient use Home O2? No    3.  Tolerates ambulation and mobility near baseline: Yes        How many times did the patient ambulate with nursing staff this shift? 0    Please review the Heart Failure Care Pathway for additional HF goal outcomes.    VSS on room air, tele afib cvr in 80s, denies pain and SOB, continue to monitor    Cathy Machado RN  12/3/2017

## 2017-12-03 NOTE — PROGRESS NOTES
[facility]    Hospitalist Progress Note    Date of Service (when I saw the patient): 12/03/2017    Assessment & Plan   Dennys Ward is a 89 year old male with hx of DM1, chronic a-fib on chronic anticoagulation with warfarin, CKD, hx of DVT, and prior CVA who presented on 11/24/2017 with chest pain and dyspnea on exertion, and was admitted for NSTEMI and cardiogenic shock with new diagnosis of CHF with EF <20%.    Advanced care planning  Discussed goals of care at length with patient, his son Cj, and his daughter Brenda on 12/1. Discussed new diagnoses of NSTEMI and CHF, and limitation of management in light of his age and co-morbidities. Discussed code status and very low likelihood  meaningful recovery. Following this discussion, the patient's code status was changed to DNR/DNI. He is open to hospice, and would to explore this with the hospice SW with his son present and daughter phoned in.   - Plan for now is restorative short of intubation/CPR  - Will likely d/c with hospice tomorrow    NSTEMI  Presented with dyspnea on exertion and chest heaviness. EKG on arrival showed Q waves in the inferior leads with no previous EKG to compare. Initial troponin 3.593 and peaked at 7.231. TTE on arrival showed LVEF <20% with severe global hypokinesis and minor regional variation. In the ED, he was not initiated on a heparin gtt because he was anticoagulated with warfarin. Cardiology was consulted on admission and felt that risks of coronary angiogram outweighed benefit, and recommended medical management with plan to reassess candidacy an an outpatient if renal function were to improve and patient felt strongly about intervention.   - Continues on PTA Plavix (aspirin allergy)    Cardiogenic shock  Acute systolic CHF exacerbation, new diagnosis  Mild to moderate mitral regurgitation  TTE on arrival showed LVEF <20% with severe global hypokinesis and minor regional variation, no severe valvular  pathology. He has no previous diagnosis of CHF. Prior TTE in Nov 2016 showed LVEF 55%. Associated hypotension, elevated lactic acid, and MORIAH also present on arrival. Ddx for cardiomyopathy include ischemia, stress cardiomyopathy, and viral myocarditis given recent URI sx.  - He has been started on Imdur 10 mg TID and hydralazine 20 mg TID  - Holding ACE/ARB, spironolactone, and Lasix due to MORIAH    Chronic atrial fibrillation  Auto rate-controlled. Maintained on warfarin for CHADS-VASc score 8. Warfarin was initially held on admission for angiogram, but has since been resumed.  - Continues on warfarin per PharmD dosing, bridging with heparin gtt    Essential hypertension  [PTA: Lasix 20 mg po daily, losartan 100 mg daily]  - Holding Lasix and losartan due to borderline blood pressures and MORIAH    Oliguric MORIAH on CKD stage III, Improved  Creatinine up to 2.55 from baseline 1.2-1.6. Attributed to hypoperfusion from CHF/NSTEMI, losartan, and Lasix vs cardiorenal syndrome also possible. Renal US (11/25) normal.  - Cr has appeared to plateau in the 2.2-2.3 range    Hypersensitivity gag reflex  Dyspepsia   Ongoing issues during this hospitalization with heightened gag reflex causing episodes of dry heaves and vomiting. Reports this has been present mildly for 2 months, worsened over previous 1-2 weeks and seemingly associated with post-nasal driop. Oropharyngeal exam normal. Video swallow study (11/29) normal.  - He has been started on fluticasone nasal spray and ranitidine with improvement    Diarrhea, Improving  Suspect antibiotic-associated from pip-tazo administered on arrival for possible sepsis. 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 Blood sugars were initially labile on his PTA regimen, but stabilized with initiation of Lantus  - Blood sugars  in last 24 hours  -  Decrease Lantus from 20 to 10 units daily  - Medium SSI available.    History of DVT  Maintained on warfarin which has been continued    FEN: mod CHO, 2 g sodium diet  DVT Prophylaxis: Heparin gtt and Warfarin  Code Status: DNR/DNI    Disposition: Expected discharge likely tomorrow with residential hospice    [facility]    Interval History   No events overnight. Denies dizziness/lightheadedness. Denies chest pain. Persistent dyspneic on exertion. Very poor appetite. Patient and family leaning toward hospice, but need his wife to be in board. She is coming in later this afternoon. Met with son and daughter at bedside and informed that decision on hospice should be made by tomorrow  - Decrease Lantus from 20 to 10 units daily  - d/c carb ratio    -Data reviewed today: I reviewed all new labs and imaging results over the last 24 hours. I personally reviewed no images or EKG's today.    Physical Exam   Temp: 97.4  F (36.3  C) Temp src: Oral BP: 102/65 Pulse: 87 Heart Rate: 75 Resp: 18 SpO2: 95 % O2 Device: None (Room air)    Vitals:    12/01/17 0344 12/02/17 0212 12/03/17 0300   Weight: 78.5 kg (173 lb) 79.3 kg (174 lb 12.8 oz) 79.2 kg (174 lb 9.6 oz)     Vital Signs with Ranges  Temp:  [96.6  F (35.9  C)-98  F (36.7  C)] 97.4  F (36.3  C)  Pulse:  [74-87] 87  Heart Rate:  [73-85] 75  Resp:  [16-18] 18  BP: (102-114)/(65-79) 102/65  SpO2:  [95 %-97 %] 95 %  I/O last 3 completed shifts:  In: 240 [P.O.:240]  Out: 700 [Urine:700]    Constitutional: Appears comfortable, NAD  Respiratory: Breathing non-labored. Diminished breath sounds bilaterally  Cardiovascular: Irregular rhythm, normal rate. 1+ BLE edema  GI: +BS, abd soft/NT  Skin/Integumen: No rash  Neuro: Alert and appropriate, ALTMAN  Psych: Calm and cooperative    Medications     Warfarin Therapy Reminder       IV infusion builder WITH additives 50 mL/hr at 11/29/17 8055     - MEDICATION INSTRUCTIONS -         warfarin  1 mg Oral ONCE at 18:00     [START ON  12/4/2017] insulin glargine  10 Units Subcutaneous Q24H     ranitidine  150 mg Oral Q24H     fluticasone  1 spray Both Nostrils Daily     isosorbide dinitrate  10 mg Oral TID     hydrALAZINE  20 mg Oral Q8H [facility]     clopidogrel  75 mg Oral Daily     insulin aspart  1-7 Units Subcutaneous TID AC     insulin aspart  1-5 Units Subcutaneous At Bedtime     Data     Recent Labs  Lab 12/03/17  0534 12/02/17  0605 12/01/17  0544   INR 3.10* 2.69* 2.02*   * 130* 129*   POTASSIUM 4.8 4.6 4.3   CHLORIDE 95 95 97   CO2 24 24 23   BUN 74* 77* 78*   CR 2.18* 2.30* 2.27*   ANIONGAP 9 11 9   BERE 8.9 8.6 8.8   GLC 70 75 85       No results found for this or any previous visit (from the past 24 hour(s)).

## 2017-12-04 NOTE — PLAN OF CARE
Problem: Patient Care Overview  Goal: Plan of Care/Patient Progress Review  Outcome: No Change  Heart Failure Care Pathway  GOALS TO BE MET BEFORE DISCHARGE:    1. Decrease congestion and/or edema with diuretic therapy to achieve near      optimal volume status.            Dyspnea improved:  No, please explain: FORRESTER            Edema improved:     No, please explain: BLE +3        Net I/O and Weights since admission:          11/28 2300 - 12/03 2259  In: 3302 [P.O.:2638; I.V.:664]  Out: 2950 [Urine:2950]  Net: 352            Vitals:    11/24/17 1042 11/25/17 0600 11/26/17 0507 11/27/17 0147   Weight: 78 kg (172 lb) 76.3 kg (168 lb 4.8 oz) 76.5 kg (168 lb 11.2 oz) 76.1 kg (167 lb 12.8 oz)    11/28/17 0530 11/29/17 0641 11/30/17 0125 12/01/17 0344   Weight: 75.9 kg (167 lb 6 oz) 76.5 kg (168 lb 9.6 oz) 77.8 kg (171 lb 8 oz) 78.5 kg (173 lb)    12/02/17 0212 12/03/17 0300 12/04/17 0510   Weight: 79.3 kg (174 lb 12.8 oz) 79.2 kg (174 lb 9.6 oz) 79 kg (174 lb 3.2 oz)       2.  O2 sats > 92% on RA or at prior home O2 therapy level.          Current oxygenation status:       SpO2: 94 %         O2 Device: None (Room air),  Oxygen Delivery: 1.5 LPM         Able to wean O2 this shift to keep sats > 92%:  Yes       Does patient use Home O2? No    3.  Tolerates ambulation and mobility near baseline: Yes        How many times did the patient ambulate with nursing staff this shift? 0    Please review the Heart Failure Care Pathway for additional HF goal outcomes.    VSS, tele afib CVR in the 80s, Denies pain and c/o FORRESTER, Plan to d/c today to TCU    Cathy Machado RN  12/4/2017

## 2017-12-04 NOTE — PLAN OF CARE
Problem: Patient Care Overview  Goal: Individualization & Mutuality  Outcome: No Change  VSS. Tele: Afib c/ CVR and BBB. IV out and monitor off. TCU paperwork sent with patient. Son here to ride along with [employer]. Pt has all of his belongings.

## 2017-12-04 NOTE — PROVIDER NOTIFICATION
Brief update:    Paged as patient lost IV access.    Plan is for discharge to TCU tomorrow. Currently not requiring IV medications.    The patient requires nothing by IV currently, is high risk for acute decompensation in the setting of ejection fraction less than 20%, presentation earlier this admission with cardiogenic shock. While patient is hospitalized, he should have IV access as he is at high risk for potentially fatal dysrhythmia    Obie York MD  3:22 AM

## 2017-12-04 NOTE — PROVIDER NOTIFICATION
MD Notification    Notified Person:  MD    Notified Persons Name:York    Notification Date/Time:12/4/17 @0310    Notification Interaction:  Spoke with Physician    Purpose of Notification: Loss of IV access. Order to d/c IV access ?     Orders Received: restart iv,  New IV started at 0330 and tolerated well with one attempt    Comments:  plan to d/c tomorrow to TCU. No IV medications. BS at 0200 was 75, 240cc OJ given and 1/2 slice pb toast to keep BS up through out the rest of the night.

## 2017-12-04 NOTE — DISCHARGE SUMMARY
[facility]    Discharge Summary  Hospitalist    Date of Admission:  11/24/2017  Date of Discharge:  12/4/2017  Discharging Provider: Jocelyne Acosta  Date of Service (when I saw the patient): 12/04/17    Discharge Diagnoses     1. NSTEMI  2. Cardiogenic shock   3. Acute systolic CHF exacerbation, new diagnosis  4. Mild to moderate mitral regurgitation  5. Chronic atrial fibrillation  6. Essential hypertension  7. Oliguric MORIAH on CKD stage III  8. Hypersensitivity gag reflex  9. Dyspepsia  10. DM1 with diabetic polyneuropathy  11. History of DVT    History of Present Illness   Please see H&P by Dr. Gasca on 11/24/2017    Hospital Course   Dennys Ward is a 89 year old male with hx of DM1, chronic a-fib on chronic anticoagulation with warfarin, CKD, hx of DVT, and prior CVA who presented on 11/24/2017 with chest pain and dyspnea on exertion, and was admitted for NSTEMI and cardiogenic shock with new diagnosis of CHF with EF <20%. We spent a significant portion of her hospitalization reviewing goals of care. The patient is very close to transitioning to hospice, but has ultimately decided to try a trial of rehab. He will discharge to TCU. *Note, he remains somewhat orthostatic with severe dyspnea on exertion, but he is as medically-optimized as he is going to be and wants to try trial of rehab. Cardiology signed off his care prior to discharge and plan to follow up with him in clinic. The following problems were addressed during his hospitalization:    Advanced care planning  Discussed goals of care at length with patient, his son Cj, and his daughter Brenda on 12/1. Discussed new diagnoses of NSTEMI and CHF, and limitation of management in light of his age and co-morbidities. Discussed code status and very low likelihood of meaningful recovery. Following this discussion, the patient's code status was changed to DNR/DNI. He and his family met with hospice during this hospital stay,  and have ultimately decided to attempt a trial of rehab and transition to hospice down the road.   - Plan for now is restorative short of intubation/CPR  - I discussed high risk for re-hospitalization at the time of discharge; for now, patient would like to return to the hospital for acute medical needs.      NSTEMI  Presented with dyspnea on exertion and chest heaviness. EKG on arrival showed Q waves in the inferior leads with no previous EKG to compare. Initial troponin 3.593 and peaked at 7.231. TTE on arrival showed LVEF <20% with severe global hypokinesis and minor regional variation. In the ED, he was not initiated on a heparin gtt because he was anticoagulated with warfarin. Cardiology was consulted on admission and felt that risks of coronary angiogram outweighed benefit, and recommended medical management with plan to reassess candidacy an an outpatient if renal function were to improve and patient felt strongly about intervention.   - Continues on PTA Plavix (aspirin allergy)  - He has a follow up appointment with Dr. Sam on 12/6      Cardiogenic shock  Acute systolic CHF exacerbation, new diagnosis  Mild to moderate mitral regurgitation  TTE on arrival showed LVEF <20% with severe global hypokinesis and minor regional variation, no severe valvular pathology. He has no previous diagnosis of CHF. Prior TTE in Nov 2016 showed LVEF 55%. Associated hypotension, elevated lactic acid, and MORIAH also present on arrival. Ddx for cardiomyopathy include ischemia, stress cardiomyopathy, and viral myocarditis given recent URI sx.  - He has been started on Imdur 10 mg TID and hydralazine 20 mg TID  - He continues on home Lasix 20 mg po daily at the time of discharge  - Holding BB, ACE/ARB, and spironolactone due to renal failure and borderline blood pressures  - He has a follow up appointment with Dr. Sam on 12/6 and [facility] clinic on 12/22     Chronic atrial fibrillation  Auto rate-controlled. Maintained on warfarin  for CHADS-VASc score 8. Warfarin was initially held on admission for angiogram, but has since been resumed.  - INR 3.71 at the time of discharge  - Recommend repeat INR on 12/5   - Continues on warfarin at discharge: I have tentatively ordered 2 mg daily to begin on 12/6     Essential hypertension  [PTA: Lasix 20 mg po daily, losartan 100 mg daily]  - Holding losartan due to borderline blood pressures and MORIAH  - Continues on home Lasix at discharge     Hypervolemic hyponatremia  Oliguric MORIAH on CKD stage III, Improved  Creatinine up to 2.55 from baseline 1.2-1.6. Attributed to hypoperfusion from CHF/NSTEMI, component of cardiorenal syndrome also likely. Renal US (11/25) normal.  - Na 127 at the time of discharge (has been hovering in the 128-130 range)  - Cr has appeared to plateau in the 2.2-2.3 range  - Recommend repeat BMP within 1 week     Hypersensitivity gag reflex  Dyspepsia   Ongoing issues during this hospitalization with heightened gag reflex causing episodes of dry heaves and vomiting. Reports this has been present mildly for 2 months, worsened over previous 1-2 weeks and seemingly associated with post-nasal driop. Oropharyngeal exam normal. Video swallow study (11/29) normal. He was started on fluticasone nasal spray and ranitidine with improvement   - He will be discharged with fluticasone daily and ranitidine 150 mg qhs     Diarrhea, Resolved  Suspect antibiotic-associated from pip-tazo administered on arrival for possible sepsis. No preceding antibiotic use.  - He is having soft stools at the time of discharge     DM1 with diabetic polyneuropathy  [PTA: NPH 15 units in AM, 13 units w/dinner, SSI] A1c 8.4. Blood sugars were initially labile on his PTA regimen, but stabilized with initiation of Lantus.   - He will be discharged with Lantus 5 units daily and aspart 1 unit per 20 g carb     History of DVT  Maintained on warfarin. See dosing as noted above    Jocelyne Acosta    Significant Results and  Procedures   As noted above    Pending Results   None    Code Status   DNR / DNI       Primary Care Physician   Jona Ortiz    Physical Exam   Temp: 98.1  F (36.7  C) Temp src: Axillary BP: 112/69   Heart Rate: 74 Resp: 16 SpO2: 92 % O2 Device: None (Room air) Oxygen Delivery: 1.5 LPM  Vitals:    12/02/17 0212 12/03/17 0300 12/04/17 0510   Weight: 79.3 kg (174 lb 12.8 oz) 79.2 kg (174 lb 9.6 oz) 79 kg (174 lb 3.2 oz)     Vital Signs with Ranges  Temp:  [96.3  F (35.7  C)-98.1  F (36.7  C)] 98.1  F (36.7  C)  Heart Rate:  [72-85] 74  Resp:  [16-18] 16  BP: ()/(48-79) 112/69  SpO2:  [92 %-98 %] 92 %  I/O last 3 completed shifts:  In: 740 [P.O.:740]  Out: 600 [Urine:600]    Constitutional: Chronically-ill appearing, NAD  Respiratory: Breathing non-labored. Lungs CTAB - no wheezes/crackles/rhonchi  Cardiovascular: Heart sounds distant, irregular rhythm, normal rate. 1+ BLE edema.   GI: +BS. Abd soft/NT  Skin: Warm and dry.   Musculoskeletal: Normal muscle bulk and tone  Neurologic: Alert and appropriate. ALTMAN  Psychiatric: Calm and cooperative    Discharge Disposition   Discharged to short-term care facility  Condition at discharge: Stable    Consultations This Hospital Stay   1. CARDIOLOGY IP CONSULT  2. NEPHROLOGY IP CONSULT  3. SWALLOW EVAL SPEECH PATH AT BEDSIDE IP CONSULT  4. PHYSICAL THERAPY ADULT IP CONSULT  5. OCCUPATIONAL THERAPY ADULT IP CONSULT  6. SOCIAL WORK CONSULT    Time Spent on this Encounter   I, Jocelyne Acosta, personally saw the patient today and spent 50 minutes discharging this patient.    Discharge Orders     Basic metabolic panel     General info for SNF   Length of Stay Estimate: Short Term Care: Estimated # of Days <30  Condition at Discharge: Stable  Level of care:skilled   Rehabilitation Potential: Fair  Admission H&P remains valid and up-to-date: Yes  Recent Chemotherapy: N/A  Use Nursing Home Standing Orders: Yes     Mantoux instructions   Give two-step Mantoux (PPD) Per  Facility Policy Yes     Reason for your hospital stay   New diagnosis of recent heart attack and end-stage congestive heart failure. We had numerous conversations regarding your goals of care, and you have chosen to continue medical treatments, including trial of rehab     Glucose monitor nursing POCT   Before meals and at bedtime     Intake and output   Every shift     Daily weights   Call Provider for weight gain of more than 2 pounds per day or 5 pounds per week.     Follow Up and recommended labs and tests   Follow up with long-term physician.  The following labs/tests are recommended: BMP and INR within 1 weeks.    Follow up with Cardiology as scheduled     Activity - Up with nursing assistance     DNR/DNI     Physical Therapy Adult Consult   Evaluate and treat as clinically indicated.    Reason:  Generalized weakness, advanced CHF     Occupational Therapy Adult Consult   Evaluate and treat as clinically indicated.    Reason:  Generalized weakness, advanced CHF     Fall precautions     Advance Diet as Tolerated   Follow this diet upon discharge: 2 gram sodium diet       Discharge Medications   Current Discharge Medication List      START taking these medications    Details   isosorbide dinitrate (ISORDIL) 10 MG tablet Take 1 tablet (10 mg) by mouth 3 times daily    Associated Diagnoses: Acute systolic congestive heart failure (H)      nitroGLYcerin (NITROSTAT) 0.4 MG sublingual tablet For chest pain place 1 tablet under the tongue every 5 minutes for 3 doses. If symptoms persist 5 minutes after 1st dose call 911.  Qty: 25 tablet    Associated Diagnoses: NSTEMI (non-ST elevated myocardial infarction) (H)      insulin glargine (LANTUS) 100 UNIT/ML injection Inject 5 Units Subcutaneous daily    Associated Diagnoses: Type 1 diabetes mellitus with diabetic polyneuropathy (H)      insulin aspart (NOVOLOG PEN) 100 UNIT/ML injection Inject 1-7 Units Subcutaneous 3 times daily (before meals) 1 unit for every 20 grams  "of carbohydrate    Associated Diagnoses: Type 1 diabetes mellitus with diabetic polyneuropathy (H)      hydrALAZINE (APRESOLINE) 10 MG tablet Take 2 tablets (20 mg) by mouth 3 times daily  Qty: 60 tablet    Associated Diagnoses: Acute systolic congestive heart failure (H)         CONTINUE these medications which have NOT CHANGED    Details   acetaminophen (PAIN & FEVER) 325 MG tablet Take 325 mg by mouth every 4 hours as needed      insulin syringe-needle U-100 (BD INSULIN SYRINGE ULTRAFINE) 30G X 1/2\" 0.5 ML FOR ADMINISTERING INSULIN AT HOME (1 TIME FOR NPH & 3 TIMES FOR R INSULIN)      Cholecalciferol (VITAMIN D3) 2000 UNITS CAPS Take 4,000 Units by mouth daily      clopidogrel (PLAVIX) 75 MG tablet Take 75 mg by mouth daily      furosemide (LASIX) 20 MG tablet Take 20 mg by mouth daily      Insulin Syringe/Needle 28G X 1/2\" 1 ML MISC As directed. For administering insulin at home.      MAGNESIUM-ZINC PO Magnesium Zinc pill once daily at night      warfarin (COUMADIN) 5 MG tablet Take by mouth every evening -  For history of DVT  INR Goal 2-3     7.5 mg on Monday, Wednesday and Friday  5 mg on all other days      blood glucose monitoring (ONETOUCH ULTRA) test strip TEST 4 TIMES DAILY         STOP taking these medications       insulin regular (HUMULIN R/NOVOLIN R) 100 UNIT/ML injection Comments:   Reason for Stopping:         losartan (COZAAR) 100 MG tablet Comments:   Reason for Stopping:         Insulin NPH Human, Isophane, (HUMULIN N SC) Comments:   Reason for Stopping:         insulin NPH (HUMULIN N/NOVOLIN N) 100 UNIT/ML injection Comments:   Reason for Stopping:             Allergies   Allergies   Allergen Reactions     Lisinopril Diarrhea     No Clinical Screening - See Comments Swelling     Aspirin Rash     Data   Most Recent 3 CBC's:  Recent Labs   Lab Test  11/26/17   0512  11/25/17   0545  11/24/17   1054   WBC  10.5  9.0  9.3   HGB  11.6*  10.5*  11.7*   MCV  91  91  92   PLT  245  239  278      Most " Recent 3 BMP's:  Recent Labs   Lab Test  12/04/17   0608  12/03/17   0534  12/02/17   0605   NA  127*  128*  130*   POTASSIUM  5.0  4.8  4.6   CHLORIDE  94  95  95   CO2  22  24  24   BUN  74*  74*  77*   CR  2.20*  2.18*  2.30*   ANIONGAP  11  9  11   BERE  8.8  8.9  8.6   GLC  101*  70  75     Most Recent 2 LFT's:  Recent Labs   Lab Test  11/24/17   1054   AST  41   ALT  33   ALKPHOS  115   BILITOTAL  0.9     Most Recent INR's and Anticoagulation Dosing History:  Anticoagulation Dose History     Recent Dosing and Labs Latest Ref Rng & Units 11/28/2017 11/29/2017 11/30/2017 12/1/2017 12/2/2017 12/3/2017 12/4/2017    Warfarin 1 mg - - - - - - 1 mg -    Warfarin 5 mg - - - - - 5 mg - -    Warfarin 7.5 mg - - 7.5 mg 7.5 mg 7.5 mg - - -    INR 0.86 - 1.14 2.22(H) 1.83(H) 1.86(H) 2.02(H) 2.69(H) 3.10(H) 3.71(H)        Most Recent 3 Troponin's:  Recent Labs   Lab Test  11/26/17   0512  11/25/17   2346  11/25/17   1822   TROPI  4.419*  4.134*  4.665*     Most Recent Cholesterol Panel:No lab results found.  Most Recent 6 Bacteria Isolates From Any Culture (See EPIC Reports for Culture Details):  Recent Labs   Lab Test  11/24/17   2103  11/24/17   1138  11/24/17   1054   CULT  No growth  No growth  No growth     Most Recent TSH, T4 and A1c Labs:  Recent Labs   Lab Test  11/24/17   1550   A1C  8.4*     Results for orders placed or performed during the hospital encounter of 11/24/17   XR Chest 2 Views    Narrative    XR CHEST 2 VW 11/24/2017 12:08 PM    HISTORY: Fever;       Impression    IMPRESSION: Small bibasilar pleural effusions with some minimal  atelectasis or infiltrate left lung base.     DEEDEE JOHN MD   US Renal Complete    Narrative    RENAL ULTRASOUND   11/25/2017 8:51 AM     HISTORY: Acute renal failure.     COMPARISON: None.    FINDINGS:    Right Kidney: The right kidney measures 10.1 x 4.7 x 4.4 cm. Cortical  thickness is 1.1 cm. No renal masses are seen. There is no  hydronephrosis or renal calculus.      Left Kidney: The left kidney measures 9.8 x 4.4 x 4.8 cm. Cortical  thickness is 1.1 cm. No renal masses are seen. There is no  hydronephrosis or renal calculus.     The visualized portions of the urinary bladder appear normal.      Impression    IMPRESSION: Normal renal ultrasound.     BRENDA VALLE MD   XR Video Swallow w Esophagram    Narrative    VIDEO SWALLOWING EVALUATION November 29, 2017 10:10 AM     HISTORY: Oral-pharyngeal and esophageal symptoms;     COMPARISON: None.    FLUOROSCOPY TIME: 2.3 minutes.    SPOT IMAGES OR CINE RUNS: Eight.    FINDINGS:    Thin: No penetration or aspiration.    Nectar: Not administered.    Honey: Not administered.    Pudding: No penetration or aspiration.    Semisolid: No penetration or aspiration.    Solid: No penetration or aspiration.    Frontal view of the esophagus with the patient in a wheelchair  demonstrates easy passage of contrast through the esophagus into the  stomach. No evidence of esophageal obstruction. No obvious esophageal  filling defect or mass appreciated.    TIERRA PAYTON MD

## 2017-12-04 NOTE — PROGRESS NOTES
SW:  D:  Received discharge orders for patient.  Bed available at [facility] for today.  [employer] wheelchair ride set up for 13:00 today.  Patient and family informed of the plan and in agreement to the plan.  Updated [facility] and faxed the orders and the PAS.    PAS-RR    D: Per DHS regulation, SW completed and submitted PAS-RR to [place] [employer] Direct Connect via the Senior LinkAge Line.  PAS-RR confirmation # is : [id number].    I: SW spoke with patient and family and they are aware a PAS-RR has been submitted.  JOANN reviewed with patient and family that they may be contacted for a follow up appointment within 10 days of hospital discharge if their SNF stay is < 30 days.  Contact information for Senior LinkAge Line was also provided.    A: Patient and family verbalized understanding.    P: Further questions may be directed to [facility] LinkAge Line at #[phone], option #4 for PAS-RR staff.

## 2017-12-04 NOTE — PLAN OF CARE
Problem: Cardiac: Heart Failure (Adult)  Goal: Signs and Symptoms of Listed Potential Problems Will be Absent, Minimized or Managed (Cardiac: Heart Failure)  Signs and symptoms of listed potential problems will be absent, minimized or managed by discharge/transition of care (reference Cardiac: Heart Failure (Adult) CPG).   Outcome: No Change  Heart Failure Care Pathway  GOALS TO BE MET BEFORE DISCHARGE:    1. Decrease congestion and/or edema with diuretic therapy to achieve near      optimal volume status.            Dyspnea improved:  No, please explain: remains mildly FORRESTER            Edema improved:     No, please explain: +3-+2        Net I/O and Weights since admission:          11/28 2300 - 12/03 2259  In: 3302 [P.O.:2638; I.V.:664]  Out: 2950 [Urine:2950]  Net: 352            Vitals:    11/24/17 1042 11/25/17 0600 11/26/17 0507 11/27/17 0147   Weight: 78 kg (172 lb) 76.3 kg (168 lb 4.8 oz) 76.5 kg (168 lb 11.2 oz) 76.1 kg (167 lb 12.8 oz)    11/28/17 0530 11/29/17 0641 11/30/17 0125 12/01/17 0344   Weight: 75.9 kg (167 lb 6 oz) 76.5 kg (168 lb 9.6 oz) 77.8 kg (171 lb 8 oz) 78.5 kg (173 lb)    12/02/17 0212 12/03/17 0300   Weight: 79.3 kg (174 lb 12.8 oz) 79.2 kg (174 lb 9.6 oz)       2.  O2 sats > 92% on RA or at prior home O2 therapy level.          Current oxygenation status:       SpO2: 95 %         O2 Device: None (Room air),  Oxygen Delivery: 1.5 LPM         Able to wean O2 this shift to keep sats > 92%:  Yes       Does patient use Home O2? No    3.  Tolerates ambulation and mobility near baseline: No, please explain: FORRESTER        How many times did the patient ambulate with nursing staff this shift? 2 at bedside (pt declined chair or ambulation)    Please review the Heart Failure Care Pathway for additional HF goal outcomes.    A/O, VSS, O2sats 95% on RA. Tele afib CVR. Pt discussed hospice this evening with SW; opting for TCU instead. Plan for d/c tomorrow to TCU.     Elana Esteban RN  12/3/2017

## 2017-12-04 NOTE — PLAN OF CARE
Problem: Patient Care Overview  Goal: Plan of Care/Patient Progress Review  Discharge Planner OT   Patient plan for discharge: TCU  Current status: Educated on energy conservation/work simplification and pt receptive to information. Pt stands at EOB with FWW and CGA for safety for LB clothing mgmt and toileting. Pt completes oral hygiene ADL sitting EOB with set-up provided with Min A. Fatigues very easily and requires frequent rest breaks.  Barriers to return to prior living situation: Decreased activity tolerance, current level of A with ADLs/IADLs  Recommendations for discharge: TCU  Rationale for recommendations: Daily skilled therapy to ensure safety and return to PLOF with ADLs/IADLs       Entered by: Lindy Johnson 12/04/2017 11:55 AM       Occupational Therapy Discharge Summary    Reason for therapy discharge:    Discharged to transitional care facility.    Progress towards therapy goal(s). See goals on Care Plan in [facility] electronic health record for goal details.  Goals partially met.  Barriers to achieving goals:   limited tolerance for therapy and discharge from facility.    Therapy recommendation(s):    Continued therapy is recommended.  Rationale/Recommendations:  Daily skilled therapy to ensure safety and return to PLOF with ADLs/IADLs.

## 2017-12-04 NOTE — PROGRESS NOTES
SW:  D:  Had a couple lengthy conversations with patient and family regarding discharge plans.  Patient has decided he would like to try tcu on discharge.  Patient states he is not sure how long he will be able to participate in therapy, but he feels as if he wants to try.  Patient states if he can no longer participate in therapy he will make the decision at that time if he will transition to hospice.  Patient is asking for transport to be arranged.  Explained that this will be private pay and patient and family are in agreement.  Call placed to [facility] to arrange for wheelchair transport at 13:00 today.  Will update [facility].  P:  Will continue to follow.

## 2017-12-04 NOTE — DISCHARGE INSTRUCTIONS
Patient will discharge to [facility] today via [facility] wheelchair at 13:00.  [facility]'s phone number is [phone].

## 2017-12-05 NOTE — PROGRESS NOTES
Patient was evaluated by cardiology while inpatient for NSTEMI, Cardiogenic shock, Heart failure, Atrial fib. .RN called [facility] to confirm the follow up plan for patient with Care Coordinator. RN confirmed with Care Coordinator that patient has a scheduled F/U appt on for labs 12/6/17 and with Dr. Sam. Patient also has a Core apt with Marleny Blanton, JAZMÍN on 12/22/17. RN confirmed with Care Coordinator that patient is weighed daily, to call clinic with a weight gain of 2 lbs overnight or 5 lbs in a week and to bring list of daily weights and last progress note to appt.

## 2017-12-06 PROBLEM — I50.9 CHF (CONGESTIVE HEART FAILURE) (H): Status: ACTIVE | Noted: 2017-01-01

## 2017-12-06 NOTE — PROGRESS NOTES
"HPI and Plan:   ADMITTED TO [facility]OSPITAL FOR END STAGE SYSTOLIC HEART FAILURE    No orders of the defined types were placed in this encounter.      No orders of the defined types were placed in this encounter.      Medications Discontinued During This Encounter   Medication Reason     MAGNESIUM-ZINC PO Stopped by Patient         No diagnosis found.    CURRENT MEDICATIONS:  Current Outpatient Prescriptions   Medication Sig Dispense Refill     isosorbide dinitrate (ISORDIL) 10 MG tablet Take 1 tablet (10 mg) by mouth 3 times daily       nitroGLYcerin (NITROSTAT) 0.4 MG sublingual tablet For chest pain place 1 tablet under the tongue every 5 minutes for 3 doses. If symptoms persist 5 minutes after 1st dose call 911. 25 tablet      insulin glargine (LANTUS) 100 UNIT/ML injection Inject 5 Units Subcutaneous daily       insulin aspart (NOVOLOG PEN) 100 UNIT/ML injection Inject 1-7 Units Subcutaneous 3 times daily (before meals) 1 unit for every 20 grams of carbohydrate       hydrALAZINE (APRESOLINE) 10 MG tablet Take 2 tablets (20 mg) by mouth 3 times daily 60 tablet      fluticasone (FLONASE) 50 MCG/ACT spray Spray 1 spray into both nostrils daily 1 Bottle      ranitidine (ZANTAC) 150 MG tablet Take 1 tablet (150 mg) by mouth At Bedtime 60 tablet      warfarin (COUMADIN) 2 MG tablet Take 1 tablet (2 mg) by mouth daily -  For history of DVT  INR Goal 2-3     7.5 mg on Monday, Wednesday and Friday  5 mg on all other days 30 tablet      acetaminophen (PAIN & FEVER) 325 MG tablet Take 325 mg by mouth every 4 hours as needed       insulin syringe-needle U-100 (BD INSULIN SYRINGE ULTRAFINE) 30G X 1/2\" 0.5 ML FOR ADMINISTERING INSULIN AT HOME (1 TIME FOR NPH & 3 TIMES FOR R INSULIN)       Cholecalciferol (VITAMIN D3) 2000 UNITS CAPS Take 4,000 Units by mouth daily       clopidogrel (PLAVIX) 75 MG tablet Take 75 mg by mouth daily       furosemide (LASIX) 20 MG tablet Take 20 mg by mouth daily       Insulin Syringe/Needle 28G X 1/2\" " "1 ML MISC As directed. For administering insulin at home.       blood glucose monitoring (ONETOUCH ULTRA) test strip TEST 4 TIMES DAILY         ALLERGIES     Allergies   Allergen Reactions     Lisinopril Diarrhea     No Clinical Screening - See Comments Swelling     Aspirin Rash       PAST MEDICAL HISTORY:  History reviewed. No pertinent past medical history.    PAST SURGICAL HISTORY:  History reviewed. No pertinent surgical history.    FAMILY HISTORY:  Family History   Problem Relation Age of Onset     Emphysema Mother      Colon Cancer Father        SOCIAL HISTORY:  Social History     Social History     Marital status:      Spouse name: N/A     Number of children: N/A     Years of education: N/A     Social History Main Topics     Smoking status: Former Smoker     Years: 13.00     Types: Cigarettes     Quit date: 1960     Smokeless tobacco: Never Used      Comment: smoked 1-2 cig day/13 years     Alcohol use No     Drug use: None     Sexual activity: Not Asked     Other Topics Concern     None     Social History Narrative       Review of Systems:  Skin:  Positive for bruising     Eyes:  Positive for glasses    ENT:  Positive for hearing loss;epistaxis difficulty swallowing  Respiratory:  Positive for dyspnea on exertion;cough SOB when laying down on back   Cardiovascular:  Negative;chest pain;cyanosis;syncope or near-syncope Positive for;palpitations;lightheadedness;dizziness;exercise intolerance;fatigue;edema    Gastroenterology: Positive for poor appetite    Genitourinary:  Positive for urinary frequency;nocturia    Musculoskeletal:         Neurologic:  Positive for stroke    Psychiatric:  Positive for sleep disturbances    Heme/Lymph/Imm:  Positive for easy bruising    Endocrine:  Positive for diabetes      Physical Exam:  Vitals: /72 (BP Location: Left arm, Cuff Size: Adult Large)  Pulse 81  Ht 1.727 m (5' 8\")  Wt 78.6 kg (173 lb 3.2 oz)  SpO2 97%  BMI 26.33 kg/m2    Constitutional:    " chronically ill;frail tachyneic with conversation, slightly confused    Skin:    dusky        Head:  normocephalic        Eyes:  pupils equal and round        Lymph:      ENT:    pallor      Neck:    JVP elevated      Respiratory:       Rales bibasilar R>L    Cardiac:   irregular rhythm     systolic murmur;grade 1;LLSB                                                 GI:  abdomen soft        Extremities and Muscular Skeletal:      2+;pitting;bilateral LE edema          Neurological:  no gross motor deficits        Psych:  Alert and Oriented x 3 Depression        CC  No referring provider defined for this encounter.

## 2017-12-06 NOTE — ED PROVIDER NOTES
History     Chief Complaint:  Abnormal Labs    HPI   Dennys Ward is a 89 year old male on Coumadin who presents to the emergency department after abnormal lab results. The patient was at a follow up cardiac appointment and the physician wanted to admit him to CCU but could not directly admit him. He was told to come to the ED. The family is unsure as to why, but the patient thought it might be sodium levels, which were at 125 four hours prior to presentation. Here in the emergency department, the patient is not complaining of anything, such as shortness of breath, chest pain and nausea. However, he is belching.    Allergies:  Lisinopril  Aspirin    Medications:    isosorbide dinitrate (ISORDIL) 10 MG tablet  nitroGLYcerin (NITROSTAT) 0.4 MG sublingual tablet  insulin glargine (LANTUS) 100 UNIT/ML injection  insulin aspart (NOVOLOG PEN) 100 UNIT/ML injection  hydrALAZINE (APRESOLINE) 10 MG tablet  fluticasone (FLONASE) 50 MCG/ACT spray  ranitidine (ZANTAC) 150 MG tablet  warfarin (COUMADIN) 2 MG tablet  Cholecalciferol (VITAMIN D3) 2000 UNITS CAPS  clopidogrel (PLAVIX) 75 MG tablet  furosemide (LASIX) 20 MG tablet    Past Medical History:    NSTEMI  Atrial fibrillation  Type 1 diabetes  History of CVA  DVT  Peripheral autonomic neuropathy due to diabetes     Past Surgical History:    History reviewed. No pertinent past surgical history.     Family History:    Emphysema  Colon Cancer    Social History:  Smoking Status: Former Smoker  Smokeless Tobacco: Never Used  Alcohol Use: No   Marital Status:        Review of Systems   Respiratory: Negative for shortness of breath.    Cardiovascular: Negative for chest pain.   Gastrointestinal: Negative for nausea and vomiting.   All other systems reviewed and are negative.    Physical Exam   First Vitals:  BP: 103/67  Heart Rate: 78  Resp: 22  SpO2: 98 %    Physical Exam  General: Resting on the gurney, appears comfortable  Head:  The scalp, face, and head appear  normal  Mouth/Throat: Mucus membranes are moist  CV:  Regular rate    Normal S1 and S2  No pathological murmur   Resp:  Breath sounds clear and equal bilaterally    Non-labored, no retractions or accessory muscle use    No coarseness    No wheezing   GI:  Abdomen is soft, no rigidity    No tenderness to palpation  MS:  Normal motor assessment of all extremities.    Good capillary refill noted.    Bilateral lower extremity edema.  Skin:  No rash or lesions noted.  Neuro:   Speech is normal and fluent. No apparent deficit.  Psych: Awake. Alert.  Normal affect.      Appropriate interactions.     Emergency Department Course     Laboratory:  Laboratory findings were communicated with the patient who voiced understanding of the findings.  CBC: HGB 11.5 (L) o/w WNL. (WBC 8.1, )   BMP: Creatinine 2.36 (H), GFR 26 (L),  (L), Chloride 93 (L), BUN 75 (H), Glucose 108 (H) o/w WNL   Troponin (Collected 1429): 0.836 (HH)  INR: 3.23 (H)     Interventions:  1441: NS Bolus 1,000mL IV      Emergency Department Course:  Nursing notes and vitals reviewed.  IV was inserted and blood was drawn for laboratory testing, results above.   I performed an exam of the patient as documented above.   Findings and plan explained to the Patient who consents to admission. Discussed the patient with Dr. Alvarado, who will admit the patient to a CCU bed for further monitoring, evaluation, and treatment.   I personally reviewed the laboratory results with the Patient and answered all related questions prior to admit.     Impression & Plan      Medical Decision Making:  Dennys Ward is a 89 year old male who presents to the ED from heart clinic. He was initially supposed to be a direct admission. There was some delay in waiting for a bed and the clinic opted to instead down to the ED. The patient had already been accepted by Dr. Alvarado, however, the cardiology clinic wanted him on cardiac monitoring. I was asked to see the patient and he  appeared comfortable and in no distress. Laboratory evaluation was undertaken. The patient's troponin was slightly elevated but this is considerably down compared to recent and I do not believe this represents acute ischemia. The patient already has orders in by Dr. Alvarado and will be admitted to CCU for further planned inpatient care.    Diagnosis:    ICD-10-CM    1. Heart failure (H) I50.9        Disposition:  Admitted to CCU    Blanco REYES, bertha serving as a scribe on 12/6/2017 at 2:11 PM to personally document services performed by Lenore Carlos MD based on my observations and the provider's statements to me.     12/6/2017    EMERGENCY DEPARTMENT       Lenore Carlos MD  12/08/17 9381

## 2017-12-06 NOTE — MR AVS SNAPSHOT
"              After Visit Summary   12/6/2017    Dennys Ward    MRN: [id number]           Patient Information     Date Of Birth          2/9/1928        Visit Information        Provider Department      12/6/2017 11:15 AM Gerda Sam DO [facility]        Today's Diagnoses     Acute systolic congestive heart failure (H)    -  1    Acute renal failure, unspecified acute renal failure type (H)        Chronic atrial fibrillation (H)           Follow-ups after your visit        Your next 10 appointments already scheduled     Dec 22, 2017  1:10 PM CST   CORE Enrollment with Marleny Blanton PA-C   [facility] ([facility])    [address] [place] [id number] [phone]              Who to contact     If you have questions or need follow up information about today's clinic visit or your schedule please contact [facility] directly at [phone].  Normal or non-critical lab and imaging results will be communicated to you by Minut[employer]art, letter or phone within 4 business days after the clinic has received the results. If you do not hear from us within 7 days, please contact the clinic through [employer] or phone. If you have a critical or abnormal lab result, we will notify you by phone as soon as possible.  Submit refill requests through ConnectEdu or call your pharmacy and they will forward the refill request to us. Please allow 3 business days for your refill to be completed.          Additional Information About Your Visit        [employer] in Shorts Information     ConnectEdu lets you send messages to your doctor, view your test results, renew your prescriptions, schedule appointments and more. To sign up, go to [email or web address] . Click on \"Log in\" on the left side of the screen, which will take you to the Welcome page. Then click on \"Sign up Now\" on the right " "side of the page.     You will be asked to enter the access code listed below, as well as some personal information. Please follow the directions to create your username and password.     Your access code is: [email or web address]  Expires: 2018 10:57 AM     Your access code will  in 90 days. If you need help or a new code, please call your [facility] clinic or [phone].        Care EveryWhere ID     This is your Care EveryWhere ID. This could be used by other organizations to access your [facility] medical records  [id number]        Your Vitals Were     Pulse Height Pulse Oximetry BMI (Body Mass Index)          81 1.727 m (5' 8\") 97% 26.33 kg/m2         Blood Pressure from Last 3 Encounters:   17 109/72   17 112/69    Weight from Last 3 Encounters:   17 78.6 kg (173 lb 3.2 oz)   17 79 kg (174 lb 3.2 oz)              Today, you had the following     No orders found for display       Primary Care Provider Office Phone # Fax #    Jona Ortiz [phone]       [facility] [address] [address]        Equal Access to Services     LEIDY VALLECILLO : Hadii aad ku hadasho Soomaali, waaxda luqadaha, qaybta kaalmada adeegyada, waxay idiin hayjamien ward isaac . So [facility] [phone].    ATENCIÓN: Si habla español, tiene a washburn disposición servicios gratuitos de asistencia lingüística. Llame al [phone].    We comply with applicable federal civil rights laws and [place] laws. We do not discriminate on the basis of race, color, national origin, age, disability, sex, sexual orientation, or gender identity.            Thank you!     Thank you for choosing [facility] HEART [facility]  for your care. Our goal is always to provide you with excellent care. Hearing back from our patients is one way we can continue to improve our services. Please take a few minutes to complete the written survey that you may receive in the mail " "after your visit with us. Thank you!             Your Updated Medication List - Protect others around you: Learn how to safely use, store and throw away your medicines at www.disposemymeds.org.          This list is accurate as of: 12/6/17 12:22 PM.  Always use your most recent med list.                   Brand Name Dispense Instructions for use Diagnosis    * Insulin Syringe/Needle 28G X 1/2\" 1 ML Misc      As directed. For administering insulin at home.        * BD insulin syringe ULTRAFINE 30G X 1/2\" 0.5 ML   Generic drug:  insulin syringe-needle U-100      FOR ADMINISTERING INSULIN AT HOME (1 TIME FOR NPH & 3 TIMES FOR R INSULIN)        clopidogrel 75 MG tablet    PLAVIX     Take 75 mg by mouth daily        fluticasone 50 MCG/ACT spray    FLONASE    1 Bottle    Spray 1 spray into both nostrils daily    Dyspepsia       furosemide 20 MG tablet    LASIX     Take 20 mg by mouth daily        hydrALAZINE 10 MG tablet    APRESOLINE    60 tablet    Take 2 tablets (20 mg) by mouth 3 times daily    Acute systolic congestive heart failure (H)       insulin aspart 100 UNIT/ML injection    NovoLOG PEN     Inject 1-7 Units Subcutaneous 3 times daily (before meals) 1 unit for every 20 grams of carbohydrate    Type 1 diabetes mellitus with diabetic polyneuropathy (H)       insulin glargine 100 UNIT/ML injection    LANTUS     Inject 5 Units Subcutaneous daily    Type 1 diabetes mellitus with diabetic polyneuropathy (H)       isosorbide dinitrate 10 MG tablet    ISORDIL     Take 1 tablet (10 mg) by mouth 3 times daily    Acute systolic congestive heart failure (H)       nitroGLYcerin 0.4 MG sublingual tablet    NITROSTAT    25 tablet    For chest pain place 1 tablet under the tongue every 5 minutes for 3 doses. If symptoms persist 5 minutes after 1st dose call 911.    NSTEMI (non-ST elevated myocardial infarction) (H)       ONETOUCH ULTRA test strip   Generic drug:  blood glucose monitoring      TEST 4 TIMES DAILY        PAIN & " FEVER 325 MG tablet   Generic drug:  acetaminophen      Take 325 mg by mouth every 4 hours as needed        ranitidine 150 MG tablet    ZANTAC    60 tablet    Take 1 tablet (150 mg) by mouth At Bedtime    Dyspepsia       vitamin D3 2000 UNITS Caps      Take 4,000 Units by mouth daily        warfarin 2 MG tablet    COUMADIN    30 tablet    Take 1 tablet (2 mg) by mouth daily - For history of DVT INR Goal 2-3    7.5 mg on Monday, Wednesday and Friday 5 mg on all other days    Chronic atrial fibrillation (H)       * Notice:  This list has 2 medication(s) that are the same as other medications prescribed for you. Read the directions carefully, and ask your doctor or other care provider to review them with you.

## 2017-12-06 NOTE — ED NOTES
Bed: ED21  Expected date:   Expected time:   Means of arrival:   Comments:  Triage from cardiac clinic

## 2017-12-06 NOTE — PHARMACY-ADMISSION MEDICATION HISTORY
Admission medication history interview status for the 12/6/2017  admission is complete. See EPIC admission navigator for prior to admission medications     Medication history source reliability:Good    Actions taken by pharmacist (provider contacted, etc):  Contacted [facility] for med list ([phone])     Additional medication history information not noted on PTA med list :None    Medication reconciliation/reorder completed by provider prior to medication history? No    Time spent in this activity: 20 minutes    Prior to Admission medications    Medication Sig Last Dose Taking? Auth Provider   isosorbide dinitrate (ISORDIL) 10 MG tablet Take 1 tablet (10 mg) by mouth 3 times daily 12/6/2017 at am x1 Yes Jocelyne Acosta MD   nitroGLYcerin (NITROSTAT) 0.4 MG sublingual tablet For chest pain place 1 tablet under the tongue every 5 minutes for 3 doses. If symptoms persist 5 minutes after 1st dose call 911. prn Yes Jocelyne Acosta MD   insulin glargine (LANTUS) 100 UNIT/ML injection Inject 5 Units Subcutaneous daily 12/6/2017 at am Yes Jocelyne Acosta MD   insulin aspart (NOVOLOG PEN) 100 UNIT/ML injection Inject 1-7 Units Subcutaneous 3 times daily (before meals) 1 unit for every 20 grams of carbohydrate 12/6/2017 at x2 Yes Jocelyne Acosta MD   hydrALAZINE (APRESOLINE) 10 MG tablet Take 2 tablets (20 mg) by mouth 3 times daily 12/6/2017 at am x1 Yes Jocelyne Acosta MD   fluticasone (FLONASE) 50 MCG/ACT spray Spray 1 spray into both nostrils daily 12/6/2017 at am Yes Jocelyne Acosta MD   ranitidine (ZANTAC) 150 MG tablet Take 1 tablet (150 mg) by mouth At Bedtime 12/5/2017 at hs Yes Jocelyne Acosta MD   warfarin (COUMADIN) 2 MG tablet Take 1 tablet (2 mg) by mouth daily -  For history of DVT  INR Goal 2-3     7.5 mg on Monday, Wednesday and Friday  5 mg on all other days 12/5/2017 at pm - 5 mg Yes Jocelyne Acosta MD   acetaminophen (PAIN & FEVER)  "325 MG tablet Take 325 mg by mouth every 4 hours as needed prn Yes Reported, Patient   Cholecalciferol (VITAMIN D3) 2000 UNITS CAPS Take 4,000 Units by mouth daily 12/6/2017 at am Yes Reported, Patient   clopidogrel (PLAVIX) 75 MG tablet Take 75 mg by mouth daily 12/6/2017 at am Yes Reported, Patient   furosemide (LASIX) 20 MG tablet Take 20 mg by mouth daily 12/6/2017 at am Yes Reported, Patient   insulin syringe-needle U-100 (BD INSULIN SYRINGE ULTRAFINE) 30G X 1/2\" 0.5 ML FOR ADMINISTERING INSULIN AT HOME (1 TIME FOR NPH & 3 TIMES FOR R INSULIN)   Reported, Patient   Insulin Syringe/Needle 28G X 1/2\" 1 ML MISC As directed. For administering insulin at home.   Reported, Patient   blood glucose monitoring (ONETOUCH ULTRA) test strip TEST 4 TIMES DAILY   Reported, Patient         "

## 2017-12-06 NOTE — ED NOTES
[facility]  ED Nurse Handoff Report    ED Chief complaint: Abnormal Labs (Pt was at a follow up Cardiac apt, Md wanted to admit to CCU but cannot directly admit. not c/o anything at this time, peole that brought him were not sure what made MD want to bring patient to Er , family thought labs. )      ED Diagnosis:   Final diagnoses:   Heart failure (H)       Code Status: DNR / DNI in previous visits, to be addressed by admitting provider    Allergies:   Allergies   Allergen Reactions     Lisinopril Diarrhea     No Clinical Screening - See Comments Swelling     Aspirin Rash       Activity level - Baseline/Home:  Independent    Activity Level - Current:   Independent     Needed?: No    Isolation: No  Infection: Not Applicable    Bariatric?: No    Vital Signs:   Vitals:    12/06/17 1352 12/06/17 1354 12/06/17 1430   BP: 103/67     Resp:   22   SpO2:  98%        Cardiac Rhythm: ,   Cardiac  Cardiac Rhythm: Atrial fibrillation    Pain level:      Is this patient confused?: No, forgetful    Patient Report: Initial Complaint: abnormal labs  Focused Assessment: VSS on R/A. Pt reports from Cardiology clinic after receiving lab results showing further hyponatremia (Na 125 today). Respiratory WNL, although pt states he has SOB sometimes when laying flat. Cardiac WNL, Afib at baseline, tele reads Afib CVR today. Neuro exam exhibits some age-appropriate forgetfulness, otherwise WNL. Plan to admit to CCU (was originally supposed to be direct admit).  Tests Performed: labs, EKG  Abnormal Results: Na 125 in clinic today  Treatments provided: 1 L NS bolus initiated    Family Comments: daughter and son at bedside    OBS brochure/video discussed/provided to patient: N/A    ED Medications:   Medications   0.9% sodium chloride BOLUS (1,000 mLs Intravenous New Bag 12/6/17 7157)     Followed by   0.9% sodium chloride infusion (not administered)       Drips infusing?:  No      ED NURSE PHONE NUMBER: [phone]

## 2017-12-07 NOTE — H&P
PRIMARY CARE PHYSICIAN:  Jona Ortiz MD      PRIMARY CARDIOLOGIST:  Gerda Sam DO      CHIEF COMPLAINT:  Shortness of breath.      HISTORY OF PRESENT ILLNESS:  Dennys Ward is a very unfortunate and pleasant, 89-year-old  gentleman who has a complicated medical history which includes diabetes, hypertension, bilateral edema, permanent atrial fibrillation for which he is on chronic anticoagulation, stroke and chronic kidney disease stage III.  The patient was admitted on 11/24 with chest heaviness, shortness of breath and dyspnea on exertion and was diagnosed as having NSTEMI, cardiogenic shock and acute on chronic renal failure.  The patient was subsequently discharged on 12/04, which was a couple days ago.  During his hospitalization, a significant portion of that was reviewing goals of care.  The patient was close to transition to hospice, but ultimately decided to do a trial of rehab.  At the time of discharge, he remained somewhat orthostatic with severe dyspnea on exertion.  He was medically optimized as much as they could.  The family wanted to do a trial of rehab at that time.  The patient was also discharged to a TCU and came 2 days later to the Cardiology Clinic and was seen by Dr. Sam.      Dr. Sam called me and stated the patient had crackles and low sodium and was weak and the plan was to attempt a trial of dobutamine before transitioning the patient to likely hospice.  The patient is accompanied by his family.  Currently, he is respiring reasonably well.  Unfortunately, the patient received 1 liter of normal saline in the Emergency Department prior to him coming to the floor.  Currently, he is on 2-3 liters of oxygen.  The plan is that the patient is going to get a PICC line and would do a trial of dobutamine.  He has elected to be DNR/DNI.      PAST MEDICAL HISTORY:   1.  End-stage CHF.  The patient's most recent echo showed EF of less than 20% with severe global  hypokinesis and minor regional variations, no severe valvular pathology.   2.  Permanent atrial fibrillation for which he is on anticoagulation.  His CHADS-VASc score is 8.    3.  Hypertension.   4.  Stage III chronic kidney disease, baseline creatinine is about 1.6.   5.  Recent admission for cardiogenic shock due to acute systolic congestive heart failure and history of recent NSTEMI.   6.  Dyspepsia.   7.  Diarrhea.   8.  Type 1 diabetes with diabetic polyneuropathy.   9.  History of DVT.   10.  History of stroke with no residual deficits.      ALLERGIES:  Aspirin causes rash, lisinopril causes diarrhea.      SOCIAL HISTORY:  The patient quit smoking, no alcohol for about 40 years.      FAMILY HISTORY:  Reviewed and negative.      CURRENT MEDICATION LIST:   1.  Tylenol 325 mg every 4 hours.   2.  Blood glucose Accu-Cheks 4 times a day.   3.  Vitamin D3 at 2000 units take 2 tablets or 4000 units daily.   4.  Plavix 75 mg once day.   5.  Flonase 1 spray both nares daily.   6.  Lasix 20 mg once a day.   7.  Hydralazine 20 mg 3 times a day.   8.  NovoLog 1 unit per 20 grams of carbohydrate.   9.  Lantus 5 units daily.   10.  Isordil 10 mg 3 times a day.   11.  Sublingual nitroglycerin 0.4 mg under the tongue as needed.   12.  Zantac 150 mg at bedtime.   13.  Warfarin per nomogram.      REVIEW OF SYSTEMS:  Ten systems reviewed, positive for dyspnea on exertion, no chest pain, weak, no fevers, chills, sweats.  Did have 1 episode of diarrhea.  Rest of review of systems reviewed.      PHYSICAL EXAMINATION:   VITAL SIGNS:  Temperature 98.1, heart rate 81, respirations 11, blood pressure 108/75, sats 95% on 2 liters.   GENERAL:  The patient is a chronically ill-appearing, 89-year-old  male who is oriented x3.   HEENT:  Pupils equal.  Sclerae are anicteric.  Mucous membranes are moist.   NECK:  Veins are distended up to the angle of the jaw.   LUNGS:  He has bilateral crackles.   CARDIOVASCULAR:  S1, S2 irregularly  irregular with a 1/6 systolic murmur.   ABDOMEN:  Obese.   EXTREMITIES:  He has +2 pitting edema.   NEUROLOGIC:  He is diffusely weak, but grossly nonfocal.  Cranial nerves grossly intact.      LABORATORY STUDIES:  As dictated in history of present illness.  Other labs include sodium 128, potassium 4.7, BUN of 75, creatinine is 2.36, his calculated GFR is 26.  Troponin 0.836.  White count 8.1, hemoglobin 11.5, platelets of 248.  INR is 3.23.      ASSESSMENT:  Dennys Ward is an 89-year-old gentleman with severe systolic congestive heart failure with ejection fraction less than 20% with advanced heart failure with stage III/IV New York Heart classification symptoms who is DNR/DNI and who is being admitted for a trial of dobutamine.      PLAN:   1.  End-stage systolic congestive heart failure with acute on chronic decompensation.  The patient will be admitted to the cardiac intensive care unit.  The patient will have a PICC line placed.  The patient will be seen by Cardiology and the plan is that the patient will be on dobutamine drip.  For now, we will get the patient on IV Lasix, get a PICC line in place, monitor his electrolytes and await initiation of dobutamine as per Cardiology.   2.  Stage III chronic kidney disease with acute kidney injury.  The patient's creatinine is 2.36 with a calculated GFR of 26.  This is above his baseline.  Suspect this is due to ongoing hypoperfusion of his kidneys.  Expect that his creatinine should improve with improved cardiac output now with the dobutamine.  We will hold the patient's losartan.   3.  Mild troponin elevation.  The patient is on Plavix.  He denies any chest pain, but positive for dyspnea on exertion.   4.  Hypertension.  We will continue the patient on all his other blood pressure medications with the exception of losartan.   5.  Permanent atrial fibrillation with supratherapeutic INR.  We will have pharmacy dose, but his INR is currently supratherapeutic, so  warfarin will likely be held.   6.  Diabetes.  The patient will be on a moderate carbohydrate diet and will receive Lantus and sliding scale insulin.   7.  DVT prophylaxis.  The patient is already anticoagulated.   8.  Code status:  DNR/DNI.         SHERLYN FAY MD             D: 2017 17:22   T: 2017 18:00   MT: TS      Name:     LEON RAMIREZ   MRN:      [id number]        Account:      [id number]   :      1928           Admitted:     [id number]      Document: [id number]       cc: Gerda Herrera MD

## 2017-12-07 NOTE — PROGRESS NOTES
[facility]    Hospitalist Progress Note  Haris Bowser MD    Assessment & Plan      89-year-old gentleman with PmHx of severe systolic congestive heart failure with ejection fraction less than 20% with advanced heart failure with stage III/IV New York Heart,  CKD stage 3, permanent AFIB, hypertension, type 1 diabetes with diabetic polyneuropathy, DVT, stroke, was admitted on 12/6/17 due to complaints of worsening shortness of breath and weakness. He was admitted for a trial of dobutamine. Work up done on 12/6/17 revealed, BMP significant for Na= 125, K+ 5.4, BUN 73, creat 2.5. Chest x-rays on 12/6/17 revealed, bilat pleural effusion. Lexiscan done 12/7/17 revealed, a moderate to large defect involving the mid and distal anterior wall, mid and distal septum as well as the entire apex. The appearances are consistent with the presence of a moderate to large  transmural infarction affecting this area, without evidence of significant residual ischemia. Gated images demonstrated akinesis of this defect.  The LV systolic function is severely reduced with a calculated  ejection fraction of 23%.      1.  End-stage systolic congestive heart failure, with acute on chronic decompensation: Appreciate Cardiology input. Continue IV lasix 40mg bid, plavix and isordil tid. Initial troponin 0.836.     2.  Acute kidney injury on chronic Stage III chronic kidney disease: creat 2.5-->2.19 on 12/7/17. ?due to hypoperfusion. Losartan is on hold. Monitor BMP.     3.  Hypertension: losartan is on hold. Continue oral hydralazine tid.    5.  Permanent atrial fibrillation; pt has a supratherapeutic INR of 3.31 on 12/7/17. For dosing of coumadin per Pharmacy.    6. Type 2 Diabetes: continue lantus and medium dose insulin aspart sliding scale prn. Continue moderate carbohydrate diet. HbA1C was 8.4% on 11/24/17.     7. GERD prophylaxis: continue ranitidine po qhs.     8. Anemia of chronic disorder: Hb was 10.6g/dl on  12/7/17.     9. Insomnia: for trial of melatonin 3mg qhs and trazodone prn.      DVT Prophylaxis: Plavix.  Code Status: DNR/DNI    Disposition: Expected discharge, pending clinical course..      Interval History   The pt reported getting tired with minimal exertion. He was unable to sleep last night. No complaints of chest pain/palpitations.    -Data reviewed today: I reviewed all new labs and imaging results over the last 24 hours. I personally reviewed no images or EKG's today.    Physical Exam   Temp: 97.3  F (36.3  C) Temp src: Axillary BP: 102/64   Heart Rate: 85 Resp: 14 SpO2: 98 % O2 Device: None (Room air)    Vitals:    12/06/17 1951 12/07/17 0550   Weight: 79.2 kg (174 lb 11.2 oz) 77.3 kg (170 lb 6.7 oz)     Vital Signs with Ranges  Temp:  [97.3  F (36.3  C)-97.5  F (36.4  C)] 97.3  F (36.3  C)  Heart Rate:  [74-90] 85  Resp:  [8-14] 14  BP: ()/(59-78) 102/64  SpO2:  [94 %-99 %] 98 %  I/O last 3 completed shifts:  In: 730 [P.O.:730]  Out: 1250 [Urine:1250]    Constitutional: Elderly white male, awake, cooperative, no apparent distress, O2 Sats 98% on RA  Respiratory: BS vesicular bilaterally, but absent in left base, reduced in right base, with right basal insp crackles, no wheezing  Cardiovascular: S1 and S2, reg, no murmur noted  GI: Soft, non-distended, non-tender, no masses, BS present+  Skin/Integumen: No rashes  Extremities: Bilat pedal edema 2+    Medications     NaCl       - MEDICATION INSTRUCTIONS -       Continuing ACE inhibitor/ARB/ARNI from home medication list OR ACE inhibitor/ARB order already placed during this visit       - MEDICATION INSTRUCTIONS -         sodium chloride (PF)  10 mL Intravenous Once     cholecalciferol  4,000 Units Oral Daily     clopidogrel  75 mg Oral Daily     fluticasone  1 spray Both Nostrils Daily     hydrALAZINE  20 mg Oral TID     insulin aspart  1-7 Units Subcutaneous TID AC     insulin glargine  5 Units Subcutaneous Daily     isosorbide dinitrate  10 mg  Oral TID     ranitidine  150 mg Oral At Bedtime     furosemide  40 mg Intravenous BID     sodium chloride (PF)  10 mL Intracatheter Q8H     insulin aspart  1-7 Units Subcutaneous TID AC     insulin aspart  1-5 Units Subcutaneous At Bedtime       Data     Recent Labs  Lab 12/07/17  0550 12/06/17  1429 12/06/17  1025 12/04/17  0608   WBC 9.6 8.1  --   --    HGB 10.6* 11.5*  --   --    MCV 88 89  --   --     248  --   --    INR 3.31* 3.23*  --  3.71*   * 128* 125* 127*   POTASSIUM 4.8 4.7 5.4* 5.0   CHLORIDE 96 93* 93* 94   CO2 23 24 24 22   BUN 76* 75* 73* 74*   CR 2.19* 2.36* 2.50* 2.20*   ANIONGAP 10 11 13.4 11   BERE 8.8 9.2 9.6 8.8   GLC 98 108* 190* 101*   TROPI  --  0.836*  --   --        Recent Results (from the past 24 hour(s))   XR Chest Port 1 View    Narrative    CHEST ONE VIEW PORTABLE    12/6/2017 6:46 PM     INDICATION: RN placed PICC - verify tip placement.    COMPARISON: 11/24/2017.      Impression    IMPRESSION: Left PICC tip in SVC. Bilateral pleural effusions are  again seen. Shallow inspiration.    RADHA JONES MD   NM Lexiscan stress test (nuc card)    Narrative    GATED MYOCARDIAL PERFUSION SCINTIGRAPHY WITH INTRAVENOUS PHARMACOLOGIC  VASODILATATION LEXISCAN -ONE DAY STUDY     12/7/2017 2:52 PM  LEON RAMIREZ  89 years  Male  2/9/1928.    Indication/Clinical History: Myocardial infarction and cardiomyopathy    Impression  1.  Myocardial perfusion imaging using single isotope technique  demonstrated a moderate to large defect involving the mid and distal  anterior wall, mid and distal septum as well as the entire apex. The  appearances are consistent with the presence of a moderate to large  transmural infarction affecting this area without evidence of  significant residual ischemia.   2. Gated images demonstrated akinesis of this defect.  The left  ventricular systolic function is severely reduced with a calculated  ejection fraction of 23%.  There are no previous studies for  comparison .    Procedure  Pharmacologic stress testing was performed with Lexiscan at a rate of  0.08 mg/ml rapid bolus injection, for 15 seconds, 0.4 mg/5ml  intravenously. Low-level exercise was not performed along with the  vasodilator infusion.  The heart rate was 83 at baseline and adán to  89 beats per minute during the Lexiscan infusion. The rest blood  pressure was 100/73 mmHg and was 90/59 mm Hg during Lexiscan infusion.  The patient experienced shortness of breath  during the test.    Myocardial perfusion imaging was performed at rest, approximately 45  minutes after the injection intravenously of 10.2 mCi of Tc-99m  Myoview. At peak pharmacologic effect, 10-20 seconds after Lexiscan,   the patient was injected intravenously with 33.9 mCi of  Tc-99m  Myoview. The post-stress tomographic imaging was performed  approximately 60 minutes after stress.    EKG Findings  The resting EKG demonstrated atrial fibrillation with evidence of a  extensive anterior and perhaps inferior infarction. There is left  anterior hemiblock with very poor R wave progression. The stress EKG  demonstrated no significant ST segment changes.    Tomographic Findings  Overall, the study quality is adequate . On the stress images, there  is a moderate to large defect involving the mid and distal anterior  wall, mid and distal septum as well as the entire apex. There is a  very severe reduction in radiotracer uptake. On the rest images, no  appreciable changes were were seen to this defect . Gated images  demonstrated akinesis of the mid and distal anterior wall, the apex as  well as the septum. The left ventricular ejection fraction was  calculated to be 23%. TID was absent.    BRODY FARLEY MD

## 2017-12-07 NOTE — PROGRESS NOTES
Lexiscan Stress:    Patient admitted for Lexiscan stress test. Admission intake info taken. PICC line in place and is flushed and patent. Patient's lungs sound clear to auscultation. Patient was monitored throughout test with 12 lead EKG, BP and O2 sats. Injection given. Patient tolerated well. VSS. Patient denied chest pain or pressure. Had some shortness of breath that patient states was similar to his usual shortness of breath. SOB lasted less than 3 minutes. Lung sounds remained clear. Pt stable and pain free so transferred back to Rm 267 per wheelchair.

## 2017-12-07 NOTE — PLAN OF CARE
Problem: Cardiac: Heart Failure (Adult)  Goal: Signs and Symptoms of Listed Potential Problems Will be Absent, Minimized or Managed (Cardiac: Heart Failure)  Signs and symptoms of listed potential problems will be absent, minimized or managed by discharge/transition of care (reference Cardiac: Heart Failure (Adult) CPG).   Outcome: No Change  Heart Failure Care Pathway  GOALS TO BE MET BEFORE DISCHARGE:    1. Decrease congestion and/or edema with diuretic therapy to achieve near      optimal volume status.            Dyspnea improved:  No, please explain: still remains SOB/FORRESTER with minimal exertion             Edema improved:     No, please explain: 2-3+HENRIK         Net I/O and Weights since admission:                       Vitals:    12/06/17 1951   Weight: 79.2 kg (174 lb 11.2 oz)       2.  O2 sats > 92% on RA or at prior home O2 therapy level.          Current oxygenation status:       SpO2: 95 %         O2 Device: None (Room air),            Able to wean O2 this shift to keep sats > 92%:  Yes       Does patient use Home O2? No    3.  Tolerates ambulation and mobility near baseline: No, please explain: end stage CHF, readmit from TCU after clinic appointment today.        How many times did the patient ambulate with nursing staff this shift? 1    Please review the Heart Failure Care Pathway for additional HF goal outcomes.    Aysha Ortiz RN  12/6/2017   Tele: Afib CVR. Denies pain. PICC line placed in right upper arm, dressing changed because it was saturated with blood. Jones placed. Probable start of dobutamine drip after Cardiology to sees pt tomorrow. Will continue to monitor.

## 2017-12-07 NOTE — CONSULTS
REASON FOR ASSESSMENT:  CHF Consult for 2 gm NA Diet Education    NUTRITION HISTORY:  Visited with pt this morning  Pt states he is having a hard time swallowing and is eating mostly cream of wheat at every meal.     Living situation:   Pt lives with his wife in an assisted living facility, however has been in/out of hospital/TCU for last 3 weeks.     Grocery shopping:  All meals are proved by facilities.      Meal preparation:  Pt picks his own meals but the facility provides them.     Breakfast:  Cream of wheat, toast and hot cocolate    Lunch:  Cream of wheat, toast and hot cocolate    Dinner:   Cream of wheat, toast and hot cocolate    Previous diet instructions:  Pt states that he has never had any diet education. However, he knows that he is not getting any salt.       CURRENT DIET:  NPO for procedures    NUTRITION DIAGNOSIS:  Food- and nutrition-related knowledge deficit.     INTERVENTIONS:    Nutrition Prescription:  2gm Na    Implementation:    Assessed learning needs, learning preferences, and willingness to learn    Nutrition Education (Content):  a) Provided handouts:  1) Tips for Low Na Diet  2) Label Reading  3) Low Na Foods/Drinks  4) Seasoning Your Food Without Salt  5) Low Na Recipe Booklet  b) Discussed rational for limiting Na for CHF and stressed importance of following 2 gm Na guidelines     Nutrition Education (Application):  a) Discussed current eating habits and recommended alternative food choices.    Anticipated good compliance    Diet Education - refer to Education Flowsheet    Goals:    Patient verbalizes understanding of diet.    All of the above goals met during the education session    Follow Up:    Provided RD contact information for future questions.    Recommend Out-Patient Nutrition Referral, if further diet instructions are needed.

## 2017-12-07 NOTE — CONSULTS
CARDIOLOGY CONSULTATION      HISTORY OF PRESENT ILLNESS:  Dennys Ward is a pleasant 89-year-old gentleman who was admitted to the hospital from 11/24 to 12/04 with NSTEMI and cardiogenic shock with heart failure exacerbation in the setting of chronic renal disease and diabetes.  During that hospitalization, he actually presented with chest pain, and he did have some dynamic ECG changes and was in atrial fibrillation with a more rapid ventricular rate.  His troponin had a baseline on admission of 4.3, trended up to 7.2 and then trended back down.  They had discussed doing a coronary angiogram, but ultimately it was never done given that his creatinine never improved.  His BNP on admission was greater than 35,000, and his ejection fraction was less than 20%.  He had no known history of coronary artery disease and, through Care Everywhere, it was determined that he had had an echo the previous year and his ejection fraction was normal at that time at 55% without any regional wall motion abnormalities, so something had happened in the subsequent year.  During that hospitalization, there was discussion about hospice, but the left patient elected to go to rehab and has been in a TCU, not ready to start hospice yet.  He was started on Imdur and hydralazine and continued on home Lasix at 20 mg day at the time of discharge.  They held the beta blocker, ACE and spironolactone due to renal failure and borderline blood pressure.  His initial weight when he came in was 78 kg.  When he was discharged on 12/04, he was 79 kg, and he came in this admission at 78 kg.  He is down to 77 kg today.  Again, when they dismissed him, they dismissed him on 20 mg of Lasix daily orally, which is the same dose he had come in on.  He was also on Cozaar when he initially came in, but that was stopped.      He came back to see Dr. Sam in the clinic on 12/06/2017.  She noted his sodium was still low.  It had been 127 when he was dismissed  from the hospital.  It was 125 when she saw him.  His creatinine was 2.2 when he was dismissed and went to 2.5 when she saw him, and this was at 10:00 a.m.  Upon repeat once he got to the hospital, his lab values were about the same as he was on dismissal, so there was no acute change.  His INR is 3.2.  After looking at the labs, she decided to admit him to the hospital for heart failure.  She had discussed possibly doing some additional therapy such as dobutamine.  He was sent to the Emergency Department and was given a liter of fluid.  He remains DNR/DNI, but still would like appropriate management.  He denies any chest pain at this time.      PAST MEDICAL HISTORY:   1.  Heart failure with ejection fraction of 20% (was normal in 2016 elsewhere).   1.  Chronic atrial fibrillation, on anticoagulation.   2.  Hypertension.   3.  Chronic renal disease with a creatinine around 2-2.5 (prior baseline was 1.6).   4.  NSTEMI in 11/2017 with no coronary evaluation.   5.  Diabetes.   6.  History of stroke without residual deficits.      ALLERGIES:     1.  Aspirin causes a rash.   2.  Lisinopril causes diarrhea.      SOCIAL HISTORY:  He quit smoking remotely.  No alcohol.      FAMILY HISTORY:  Negative beyond that noted in the HPI.      MEDICATIONS ON ADMISSION:   1.  Tylenol 325 mg every 4 hours.   2.  Vitamin D.   3.  Plavix 75 mg a day.   4.  Lasix 20 mg a day.   5.  Hydralazine 20 mg 3 times a day.   6.  Insulin and Lantus.   7.  Isordil 10 mg 3 times a day.   8.  Coumadin.      REVIEW OF SYSTEMS:  Negative beyond that noted in the HPI.      PHYSICAL EXAMINATION:   VITAL SIGNS:  Blood pressure is 107/73, respiratory rate 10 and he is satting 94%.  He is 77.3 kg.   GENERAL:  He is a male appearing his stated age.  He is sitting up in bed in no acute distress.   CARDIOVASCULAR:  He is irregularly irregular with a soft systolic ejection murmur.  No diastolic murmur.   LUNGS:  He has mild crackles with decreased breath sounds  at the bases.   ABDOMEN:  Soft and nontender.   EXTREMITIES:  No clubbing or cyanosis.  He does have 2+ edema, right greater than left.   SKIN:  Diffuse bruising.  No rashes.   HEENT:  No icterus.      LABORATORY EVALUATION:  As noted in the HPI.      IMPRESSION/REPORT/PLAN:   1.  Heart failure with ejection fraction less than 20%.   2.  Stage III chronic renal disease with a creatinine around 2.3.   3.  Probable coronary artery disease, undergoing evaluation, currently on Plavix.   4.  Hypertension, on Isordil and hydralazine.  He is not on ACE because of renal disease.   5.  Chronic atrial fibrillation, on anticoagulation with Coumadin.   6.  Diabetes.   7.  DNR/DNI, but not hospice.      DISCUSSION:  It was a pleasure being involved in the care of Mr. Ward today.  I have reviewed his history and discussed his symptoms.  Presently, it sounds like he is rather stable in his point of view.  We discussed his goals of care.  He actually would like to have aggressive management and does not want to die at this point.  He thus does not want to be on hospice.  He would be willing to consider an angiogram, understanding that he may end up with renal failure.  Again, taking into account these goals of care, we cannot do that because of his INR being 3.  At this point, we could do a nuclear stress test to just kind of see where we are in terms of scar and that may help us too if he has further renal dysfunction.  He has been started on Lasix 40 mg twice a day, which we will continue.  He has diuresed since he has been here, and his creatinine has been stable.  The second his creatinine starts to bump, we will probably back down on that.  We will continue the other medications as prescribed.  The patient understands and is in agreement with the plan.  All questions were answered.        It was a pleasure to see him.  Please do not hesitate to contact me with any questions or concerns.         CHEYANNE TOLEDO MD              D: 2017 10:38   T: 2017 11:47   MT: TERESA#[id number]      Name:     LEON RAMIREZ   MRN:      [id number]        Account:       [id number]   :      1928           Consult Date:  2017      Document: [id number]

## 2017-12-07 NOTE — PROGRESS NOTES
"SPIRITUAL HEALTH SERVICES Progress Note  [facility]    Initial visit per previous hx.   visited first w pt alone and then was joined by pt's son and daughter.  Pt shared his lack of clarity re: the POC for today, and this was not resolved until the Cardiologist met w pt and family to explain the order for a nuclear stress test and invite their consent.  Conversation about this diagnostic testing suggests that pt and family are still moving between thoughts of Hospice and continued aggressive treatment.  Pt himself states that he's ready to die, yet also expresses that he wants to keep living.  Despite this current ambiguity about the \"best\" path forward for pt, pt's family demonstrates loving care/support of pt and pt expresses gratitude for their care.   provided emotional/spiritual support and prayer; and will plan to visit pt again after the weekend.                                                                                                                                           Daniel Pichardo M.Div., [facility]  Staff   Pager [phone]    "

## 2017-12-07 NOTE — PLAN OF CARE
Problem: Patient Care Overview  Goal: Plan of Care/Patient Progress Review  OT/CR: Patient going for nuc stress test today. Holding eval until after.

## 2017-12-07 NOTE — PHARMACY-ANTICOAGULATION SERVICE
Clinical Pharmacy - Warfarin Dosing Consult     Pharmacy has been consulted to manage this patient s warfarin therapy.  Indication: Atrial Fibrillation  Therapy Goal: INR 2-3  Warfarin Prior to Admission: Yes  Warfarin PTA Regimen: 7.5 mg on Monday, Wednesday and Friday  Significant drug interactions: Plavix    INR   Date Value Ref Range Status   12/06/2017 3.23 (H) 0.86 - 1.14 Final   12/04/2017 3.71 (H) 0.86 - 1.14 Final       Recommend warfarin 0 mg today.  Pharmacy will monitor Dennys Ward daily and order warfarin doses to achieve specified goal.      Please contact pharmacy as soon as possible if the warfarin needs to be held for a procedure or if the warfarin goals change.

## 2017-12-07 NOTE — PLAN OF CARE
Problem: Cardiac: Heart Failure (Adult)  Goal: Signs and Symptoms of Listed Potential Problems Will be Absent, Minimized or Managed (Cardiac: Heart Failure)  Signs and symptoms of listed potential problems will be absent, minimized or managed by discharge/transition of care (reference Cardiac: Heart Failure (Adult) CPG).   Outcome: No Change  Heart Failure Care Pathway  GOALS TO BE MET BEFORE DISCHARGE:    1. Decrease congestion and/or edema with diuretic therapy to achieve near      optimal volume status.            Dyspnea improved:  No, please explain: pt admitted on [facility]            Edema improved:     No, please explain: pt admitted on pms        Net I/O and Weights since admission:          12/01 2300 - 12/06 2259  In: 490 [P.O.:490]  Out: 350 [Urine:350]  Net: 140            Vitals:    12/06/17 1951 12/07/17 0550   Weight: 79.2 kg (174 lb 11.2 oz) 77.3 kg (170 lb 6.7 oz)       2.  O2 sats > 92% on RA or at prior home O2 therapy level.          Current oxygenation status:       SpO2: 94 %         O2 Device: None (Room air),            Able to wean O2 this shift to keep sats > 92%:  Yes       Does patient use Home O2? No    3.  Tolerates ambulation and mobility near baseline: No, please explain: pt admitted on PM shift        How many times did the patient ambulate with nursing staff this shift? 0    Please review the Heart Failure Care Pathway for additional HF goal outcomes.   Pt was admitted yesterday for abnormal labs at cardiologist office. Pt is end stage for HF and DNR/DNI. Pt is A&O but very forgetful, LS diminished with fine crackles at bases and has SOB with minimal activity.  tele is Afib/ CVR. Pt has lyon for urination and is being diuresed. Plan to discharge once diuresing is complete. Will pass on and continue to monitor.      Mallika Benedict RN  12/7/2017

## 2017-12-08 NOTE — PLAN OF CARE
Problem: Patient Care Overview  Goal: Plan of Care/Patient Progress Review  OT/CR: Attempted to see patient for Cardiac Rehab, pt requested to sleep at this time, reporting fatigue. Will reschedule evaluation for 12/9

## 2017-12-08 NOTE — PROGRESS NOTES
[facility]    Cardiology Progress Note    Date of Service (when I saw the patient): 12/08/2017     Assessment & Plan   Dennys Ward is a 89 year old male who was readmitted on 12/6/2017 from clinic in heart failure with deranged electrolytes. See detailed consult note for hx.    Recent NSTEMI  -stress test suggests a large transmural infarct in the anterior wall without evidence of ischemia. EF 23%  -Pavix for one year. No ASA to avoid triple therapy.    Systolic heart failure  Ischemic CM  -EF <20% on  echo  -Isordil 10 mg, hydralazine 20 TID, lasix 40 IV BID  -net neg 830 L (i/os likely not accurate), wt down 5 pounds from 174 to 169  -BMP pending  -creat 2.19 yesterday  Plan: Continue to diureses    Afib  Rate controlled  INR 2.96  coumadin    CKD  2.36->2.19    DM  -per IM     ATTESTATION:  Mr. Ward was seen and examined. Agree with note and plan of care. OK to resume Warfarin as stress test shows fixed defect without ischemia, so will not do cath.  He is doing well with diuresis without need for dobutamine.  He will diurese for another couple of days most likely and than switched to orals. He would like to be transferred to [facility] when there is a bed. Wrap his legs.  Rehab to see.   [facility] clinic upon dismissal would be helpful  PRIYANKA Harrington MD     Interval History   Starting to diureses and renal function improving.     Physical Exam   Temp: 98.1  F (36.7  C) Temp src: Oral BP: 108/65   Heart Rate: 80 Resp: 16 SpO2: 96 % O2 Device: None (Room air)    Vitals:    12/06/17 1951 12/07/17 0550 12/08/17 0500   Weight: 79.2 kg (174 lb 11.2 oz) 77.3 kg (170 lb 6.7 oz) 76.9 kg (169 lb 8.5 oz)     Vital Signs with Ranges  Temp:  [97.3  F (36.3  C)-98.1  F (36.7  C)] 98.1  F (36.7  C)  Heart Rate:  [71-85] 80  Resp:  [14-18] 16  BP: (100-115)/(57-78) 108/65  SpO2:  [96 %-98 %] 96 %  I/O last 3 completed shifts:  In: 240 [P.O.:240]  Out: 850 [Urine:850]    Constitutional     alert and oriented,  appears tired in chair     Skin     warm and dry to touch    ENT     no pallor or cyanosis    Neck    Supple    Chest     no tenderness to palpation    Lungs  clear to auscultation     Cardiac  irregular rhythm, S1 normal, S2 normal    Abdomen     abdomen soft    Extremities and Back     No edema observed.        Neurological     no gross motor deficits noted, affect appropriate, oriented to time, person and place.    Medications     - MEDICATION INSTRUCTIONS -       Continuing ACE inhibitor/ARB/ARNI from home medication list OR ACE inhibitor/ARB order already placed during this visit       - MEDICATION INSTRUCTIONS -         sodium chloride (PF)  10 mL Intravenous Once     melatonin  3 mg Oral At Bedtime     cholecalciferol  4,000 Units Oral Daily     clopidogrel  75 mg Oral Daily     fluticasone  1 spray Both Nostrils Daily     hydrALAZINE  20 mg Oral TID     insulin aspart  1-7 Units Subcutaneous TID AC     insulin glargine  5 Units Subcutaneous Daily     isosorbide dinitrate  10 mg Oral TID     ranitidine  150 mg Oral At Bedtime     furosemide  40 mg Intravenous BID     sodium chloride (PF)  10 mL Intracatheter Q8H     insulin aspart  1-7 Units Subcutaneous TID AC     insulin aspart  1-5 Units Subcutaneous At Bedtime       Data   Results for orders placed or performed during the hospital encounter of 12/06/17 (from the past 24 hour(s))   Glucose by meter   Result Value Ref Range    Glucose 150 (H) 70 - 99 mg/dL   Glucose by meter   Result Value Ref Range    Glucose 169 (H) 70 - 99 mg/dL   INR   Result Value Ref Range    INR 2.96 (H) 0.86 - 1.14   Glucose by meter   Result Value Ref Range    Glucose 103 (H) 70 - 99 mg/dL   Glucose by meter   Result Value Ref Range    Glucose 206 (H) 70 - 99 mg/dL       RUDY Good, CNP  Cardiology  Pager:  [phone]

## 2017-12-08 NOTE — PLAN OF CARE
Problem: Patient Care Overview  Goal: Plan of Care/Patient Progress Review  OT/CR: Reviewed chart, talked to nurse.  Pt had a positive stress test, unclear about what future course of treatment/testing to be done.  Nursing asked to check with MD before starting rehab.  MD please clarify if you want to continue with CR eval and treatment.  Thank you for your input.

## 2017-12-08 NOTE — PLAN OF CARE
Problem: Cardiac: Heart Failure (Adult)  Goal: Signs and Symptoms of Listed Potential Problems Will be Absent, Minimized or Managed (Cardiac: Heart Failure)  Signs and symptoms of listed potential problems will be absent, minimized or managed by discharge/transition of care (reference Cardiac: Heart Failure (Adult) CPG).   Outcome: Improving  Heart Failure Care Pathway  GOALS TO BE MET BEFORE DISCHARGE:    1. Decrease congestion and/or edema with diuretic therapy to achieve near      optimal volume status.            Dyspnea improved:  Yes            Edema improved:     Yes        Net I/O and Weights since admission:          12/03 0700 - 12/08 0659  In: 730 [P.O.:730]  Out: 1800 [Urine:1800]  Net: -1070            Vitals:    12/06/17 1951 12/07/17 0550 12/08/17 0500   Weight: 79.2 kg (174 lb 11.2 oz) 77.3 kg (170 lb 6.7 oz) 76.9 kg (169 lb 8.5 oz)       2.  O2 sats > 92% on RA or at prior home O2 therapy level.          Current oxygenation status:       SpO2: 98 %         O2 Device: None (Room air),            Able to wean O2 this shift to keep sats > 92%:  Yes       Does patient use Home O2? No    3.  Tolerates ambulation and mobility near baseline: No, please explain: improving however still not able to tolerate much activity        How many times did the patient ambulate with nursing staff this shift? 0    Please review the Heart Failure Care Pathway for additional HF goal outcomes    Pt admitted for dyspnea and SOB and CHF exacerbation. A&O x2 to 3, LS diminished and tele is A fib CVR. Pt being diaresesd and plan is for pt and family to decide on further plan of care. Will pass on and continue to monitor.    Mallika Benedict RN  12/8/2017

## 2017-12-09 NOTE — PROGRESS NOTES
12/09/17 0906   Quick Adds   Type of Visit Initial Occupational Therapy Evaluation   Living Environment   Lives With spouse   Living Arrangements assisted living   Home Accessibility no concerns   Transportation Available car   Living Environment Comment pt states wife is not able to help much, is using a walker.  He does share laundry with her, other cleaning done by facility.  Pt uses dining room for meals   Self-Care   Dominant Hand right   Usual Activity Tolerance fair   Current Activity Tolerance poor   Regular Exercise other (see comments)  (pt has been getting some therapy in a TCU prior to admission)   Equipment Currently Used at Home none   Activity/Exercise/Self-Care Comment pt appears frail and weak, slow to move   Functional Level Prior   Ambulation 0-->independent   Transferring 0-->independent   Toileting 0-->independent   Bathing 0-->independent   Dressing 0-->independent   Eating 0-->independent   Communication 0-->understands/communicates without difficulty   Swallowing 0-->swallows foods/liquids without difficulty   Cognition 1 - attention or memory deficits   Fall history within last six months no  (pt denies recent falls but has had some falls.)   Which of the above functional risks had a recent onset or change? ambulation;transferring;bathing;dressing   General Information   Onset of Illness/Injury or Date of Surgery - Date 12/06/17   Referring Physician Tima Alvarado   Patient/Family Goals Statement return home   Additional Occupational Profile Info/Pertinent History of Current Problem pt admitted with SOB, weakness.  Suffering from severe CHF, NSTEMI found as well.  Pt denies any cardiac symptoms prior to admission.  PMH includes CKD stage 3, permanent Afib, HTN, Type 1 DM, diabetic polyneuropathy, DVT, stroke.  Pt was vague about history but stated that he was in a TCU at the time of this admission.   Precautions/Limitations fall precautions   General Observations Pt in bed, had just woke up  and was groggy.  Pt slow to move, states he takes 10 minutes to wake up in the morning.   Cognitive Status Examination   Orientation person  (partial place and date)   Level of Consciousness alert;lethargic/somnolent   Able to Follow Commands success, 1-step commands   Personal Safety (Cognitive) at risk behaviors demonstrated  (pt very slow to move, physical assist for transfer)   Memory impaired   Attention No deficits were identified   Visual Perception   Visual Perception Wears glasses   Pain Assessment   Patient Currently in Pain No   Range of Motion (ROM)   ROM Quick Adds No deficits were identified   ROM Comment B UEs   Strength   Manual Muscle Testing Quick Adds Other   Strength Comments general deconditioning, not really able to partiticpate in MMT   Hand Strength   Hand Strength Comments gross grasp fair and equal   Coordination   Coordination Comments Pt demonstrates tremor with activity, is slower than usual but was able to  pills to take with nursing   Mobility   Bed Mobility Bed mobility skill: Supine to sit   Bed Mobility Skill: Supine to Sit   Level of [place]: Supine/Sit moderate assist (50% patients effort)   Physical Assist/Nonphysical Assist: Supine/Sit verbal cues;2 persons   Transfer Skill: Bed to Chair/Chair to Bed   Level of [place]: Bed to Chair stand-by assist   Physical Assist/Nonphysical Assist: Bed to Chair verbal cues;1 person assist   Weight-Bearing Restrictions full weight-bearing   Assistive Device - Transfer Skill Bed to Chair Chair to Bed Rehab Eval rolling walker   Transfer Skill: Sit to Stand   Level of [place]: Sit/Stand moderate assist (50% patients effort)   Physical Assist/Nonphysical Assist: Sit/Stand verbal cues;1 person assist   Transfer Skill: Sit to Stand full weight-bearing   Assistive Device for Transfer: Sit/Stand rolling walker   Lower Body Dressing   Level of [place]: Dress Lower Body moderate assist (50% patients effort)   Physical  "Assist/Nonphysical Assist: Dress Lower Body verbal cues;1 person assist   Eating/Self Feeding   Level of [place]: Eating stand-by assist   Physical Assist/Nonphysical Assist: Eating set-up required;1 person assist   Activities of Daily Living Analysis   Impairments Contributing to Impaired Activities of Daily Living cognition impaired;strength decreased   General Therapy Interventions   Planned Therapy Interventions ADL retraining;cognition;transfer training;home program guidelines;progressive activity/exercise   Clinical Impression   Criteria for Skilled Therapeutic Interventions Met yes, treatment indicated   OT Diagnosis decreaed independence in ADLS and IADLs   Influenced by the following impairments weakness, fatigue, mild confusion, SOB   Assessment of Occupational Performance 3-5 Performance Deficits   Identified Performance Deficits decreased independence and possible safety issues with dressing, bathing, grooming, functional mobility, and household chores   Clinical Decision Making (Complexity) Low complexity   Therapy Frequency daily   Predicted Duration of Therapy Intervention (days/wks) 5 days   Anticipated Discharge Disposition Transitional Care Facility   Risks and Benefits of Treatment have been explained. Yes   Patient, Family & other staff in agreement with plan of care Yes   [facility] \"6 Clicks\"   2016, Trustees of [facility], under license to [employer].  All rights reserved.   6 Clicks Short Forms Daily Activity Inpatient Short Form   [facility] AM-PAC  \"6 Clicks\" Daily Activity Inpatient Short Form   1. Putting on and taking off regular lower body clothing? 2 - A Lot   2. Bathing (including washing, rinsing, drying)? 2 - A Lot   3. Toileting, which includes using toilet, bedpan or urinal? 3 - A Little   4. Putting on and taking off regular upper body clothing? 3 - A Little   5. Taking care of personal grooming such as brushing teeth? 3 - A Little   6. " Eating meals? 3 - A Little   Daily Activity Raw Score (Score out of 24.Lower scores equate to lower levels of function) 16   Total Evaluation Time   Total Evaluation Time (Minutes) 15

## 2017-12-09 NOTE — PLAN OF CARE
Problem: Cardiac: Heart Failure (Adult)  Goal: Signs and Symptoms of Listed Potential Problems Will be Absent, Minimized or Managed (Cardiac: Heart Failure)  Signs and symptoms of listed potential problems will be absent, minimized or managed by discharge/transition of care (reference Cardiac: Heart Failure (Adult) CPG).   Outcome: Improving  Pt. A&O, forgetful. VSS. Tele Afib CVR. Dyspnea with activity. Room air. Lung diminished with crackles at bases. +2/3 LE edema. Ace wraps started. Poor appetite. Flat affect. Low energy. Out of bed for breakfast but refused at lunch and dinner. Pt. Only able to perform pivot transfers with staff. Jones in place. Picc in place.

## 2017-12-09 NOTE — PLAN OF CARE
Problem: Patient Care Overview  Goal: Plan of Care/Patient Progress Review  Outcome: Improving  Heart Failure Care Pathway  GOALS TO BE MET BEFORE DISCHARGE:    1. Decrease congestion and/or edema with diuretic therapy to achieve near      optimal volume status.            Dyspnea improved:  {Yes,            Edema improved:     {Yes,         Net I/O and Weights since admission:          12/03 2300 - 12/08 2259  In: 1670 [P.O.:1670]  Out: 2025 [Urine:2025]  Net: -355            Vitals:    12/06/17 1951 12/07/17 0550 12/08/17 0500   Weight: 79.2 kg (174 lb 11.2 oz) 77.3 kg (170 lb 6.7 oz) 76.9 kg (169 lb 8.5 oz)       2.  O2 sats > 92% on RA or at prior home O2 therapy level.          Current oxygenation status:       SpO2: 96 %         O2 Device: None (Room air),            Able to wean O2 this shift to keep sats > 92%na       Does patient use Home O2? no    3.  Tolerates ambulation and mobility near baseline: no        How many times did the patient ambulate with nursing staff this shift?  1 overnight    Please review the Heart Failure Care Pathway for additional HF goal outcomes.    Vss overnight, sbp soft on transfer from ccu 99 systolic. Was due hydralazine when he came over I held this as parameters were not met. Blood glucose levels WNL. picc line insitu, bloods drawn and sent this am. Bottom red and skin peeling, foam applied for protection. Tele is afib with cvr, will continue to monitor.  Eloisa Shipley RN  12/9/2017

## 2017-12-09 NOTE — PROGRESS NOTES
[facility]    Hospitalist Progress Note  Haris Bowser MD    Assessment & Plan      89-year-old gentleman with PmHx of severe systolic congestive heart failure with ejection fraction less than 20% with advanced heart failure with stage III/IV New York Heart,  CKD stage 3, permanent AFIB, hypertension, type 1 diabetes with diabetic polyneuropathy, DVT, stroke, was admitted on 12/6/17 due to complaints of worsening shortness of breath and weakness. He was admitted for a trial of dobutamine. Work up done on 12/6/17 revealed, BMP significant for Na= 125, K+ 5.4, BUN 73, creat 2.5.  Initial troponin 0.836. Chest x-rays on 12/6/17 revealed, bilat pleural effusion.      1.  End-stage systolic congestive heart failure, with acute on chronic decompensation: Appreciate Cardiology input. Continue IV lasix 40mg bid, plavix, hydralazine and isordil tid. For Cardiac Rehab. For [facility] at discharge.    2. Recent NSTEMI:  Lexiscan done 12/7/17 revealed, a moderate to large defect involving the mid and distal anterior wall, mid and distal septum as well as the entire apex. The appearances are consistent with the presence of a moderate to large transmural infarction affecting this area, without evidence of significant residual ischemia. Gated images demonstrated akinesis of this defect.  The LV systolic function is severely reduced with a calculated ejection fraction of 23%. Continue plavix and isordil.    3.  Acute kidney injury on chronic Stage III chronic kidney disease: creat 2.5-->2.19-->2.3 on 12/8/17. Losartan is on hold. Monitor BMP.     4.  Hypertension: SBP is ranging 100-110. Losartan is on hold. Continue oral hydralazine tid.    5.  Permanent atrial fibrillation:  INR of 3.31 on 12/7/17. For dosing of coumadin per Pharmacy.    6. Type 2 Diabetes: BG was 103 this am. Continue lantus and medium dose insulin aspart sliding scale prn.   Continue moderate carbohydrate diet. HbA1C was 8.4% on  11/24/17.     7. GERD prophylaxis: continue ranitidine po qhs.     8. Anemia of chronic disorder: Hb was 10.6g/dl on 12/7/17.     9. Insomnia: for melatonin 3mg qhs and trazodone prn.      DVT Prophylaxis: Plavix.  Code Status: DNR/DNI    Disposition: Expected discharge, pending clinical course..      Interval History   The pt is lethargic today. He reported not sleeping last night, inspite of taking melatonin. No complaints of chest pain/palpitations.    -Data reviewed today: I reviewed all new labs and imaging results over the last 24 hours. I personally reviewed no images or EKG's today.    Physical Exam   Temp: 97.4  F (36.3  C) Temp src: Oral BP: 113/69   Heart Rate: 87 Resp: 16 SpO2: 97 % O2 Device: None (Room air)    Vitals:    12/06/17 1951 12/07/17 0550 12/08/17 0500   Weight: 79.2 kg (174 lb 11.2 oz) 77.3 kg (170 lb 6.7 oz) 76.9 kg (169 lb 8.5 oz)     Vital Signs with Ranges  Temp:  [97.4  F (36.3  C)-98.1  F (36.7  C)] 97.4  F (36.3  C)  Heart Rate:  [71-87] 87  Resp:  [16-18] 16  BP: (100-115)/(57-78) 113/69  SpO2:  [96 %-97 %] 97 %  I/O last 3 completed shifts:  In: 540 [P.O.:540]  Out: 550 [Urine:550]    Constitutional: Elderly white male, awake, lethargic, O2 Sats 97% on RA  Respiratory: BS vesicular bilaterally, but absent in left base, reduced in right base, with right basal insp crackles,   no wheezing  Cardiovascular: S1 and S2, reg, no murmur noted  GI: Soft, non-distended, non-tender, no masses, BS present+  Skin/Integumen: No rashes  Extremities: Bilat pedal edema 2+    Medications     - MEDICATION INSTRUCTIONS -       Continuing ACE inhibitor/ARB/ARNI from home medication list OR ACE inhibitor/ARB order already placed during this visit       - MEDICATION INSTRUCTIONS -         melatonin  3 mg Oral At Bedtime     cholecalciferol  4,000 Units Oral Daily     clopidogrel  75 mg Oral Daily     fluticasone  1 spray Both Nostrils Daily     hydrALAZINE  20 mg Oral TID     insulin aspart  1-7 Units  Subcutaneous TID AC     insulin glargine  5 Units Subcutaneous Daily     isosorbide dinitrate  10 mg Oral TID     ranitidine  150 mg Oral At Bedtime     furosemide  40 mg Intravenous BID     sodium chloride (PF)  10 mL Intracatheter Q8H     insulin aspart  1-7 Units Subcutaneous TID AC     insulin aspart  1-5 Units Subcutaneous At Bedtime       Data     Recent Labs  Lab 12/08/17  1700 12/08/17  0550 12/07/17  0550 12/06/17  1429   WBC  --   --  9.6 8.1   HGB  --   --  10.6* 11.5*   MCV  --   --  88 89   PLT  --   --  229 248   INR  --  2.96* 3.31* 3.23*   *  --  129* 128*   POTASSIUM 4.9  --  4.8 4.7   CHLORIDE 93*  --  96 93*   CO2 25  --  23 24   BUN 80*  --  76* 75*   CR 2.30*  --  2.19* 2.36*   ANIONGAP 10  --  10 11   BERE 8.6  --  8.8 9.2   *  --  98 108*   TROPI  --   --   --  0.836*       No results found for this or any previous visit (from the past 24 hour(s)).

## 2017-12-09 NOTE — PROGRESS NOTES
[facility]    Hospitalist Progress Note  Haris Bowser MD    Assessment & Plan      89-year-old gentleman with PmHx of severe systolic congestive heart failure with ejection fraction less than 20% with advanced heart failure with stage III/IV New York Heart,  CKD stage 3, permanent AFIB, hypertension, type 1 diabetes with diabetic polyneuropathy, DVT, stroke, was admitted on 12/6/17 due to complaints of worsening shortness of breath and weakness. He was admitted for a trial of dobutamine. Work up done on 12/6/17 revealed, BMP significant for Na= 125, K+ 5.4, BUN 73, creat 2.5.  Initial troponin 0.836. Chest x-rays on 12/6/17 revealed, bilat pleural effusion.      1.  End-stage systolic congestive heart failure, with acute on chronic decompensation: Appreciate Cardiology input. Continue IV lasix 40mg bid, plavix, hydralazine and isordil tid. For Cardiac Rehab. For [facility] at discharge.    2. Recent NSTEMI:  Lexiscan done 12/7/17 revealed, a moderate to large defect involving the mid and distal anterior wall, mid and distal septum as well as the entire apex. The appearances are consistent with the presence of a moderate to large transmural infarction affecting this area, without evidence of significant residual ischemia. Gated images demonstrated akinesis of this defect.  The LV systolic function is severely reduced with a calculated ejection fraction of 23%. Continue plavix and isordil.    3.  Acute kidney injury on chronic Stage III chronic kidney disease: creat 2.5-->2.19-->2.3-->2.22 on 12/9/17. Losartan is on hold. Monitor BMP.     4.  Hypertension: SBP is ranging low 100s today. Losartan is on hold. Continue oral hydralazine tid.    5.  Permanent atrial fibrillation:  INR of 3.31-->1.98 on 12/9/17. For dosing of coumadin per Pharmacy.    6. Type 2 Diabetes: BG was 167 this am. Continue lantus and medium dose insulin aspart sliding scale prn.   Continue moderate carbohydrate  diet. HbA1C was 8.4% on 11/24/17.     7. GERD prophylaxis: continue ranitidine po qhs.     8. Anemia of chronic disorder: Hb was 10.6g/dl on 12/7/17.     9. Insomnia: for melatonin 3mg qhs and trazodone prn.      DVT Prophylaxis: Plavix.  Code Status: DNR/DNI    Disposition: Expected discharge, pending clinical course..      Interval History   The pt is still short of breath on minimal exertion. He reported getting some sleep last night. No complaints of chest pain/palpitations. His net input/output since admission is -605mls. His weight is done 3 lbs in the last 3 days.    -Data reviewed today: I reviewed all new labs and imaging results over the last 24 hours. I personally reviewed no images or EKG's today.    Physical Exam   Temp: 97  F (36.1  C) Temp src: Oral BP: 100/62   Heart Rate: 94 Resp: 16 SpO2: 94 % O2 Device: None (Room air)    Vitals:    12/07/17 0550 12/08/17 0500 12/09/17 0600   Weight: 77.3 kg (170 lb 6.7 oz) 76.9 kg (169 lb 8.5 oz) 77.7 kg (171 lb 3.2 oz)     Vital Signs with Ranges  Temp:  [95.5  F (35.3  C)-98.1  F (36.7  C)] 97  F (36.1  C)  Heart Rate:  [83-94] 94  Resp:  [16-18] 16  BP: ()/(57-75) 100/62  SpO2:  [94 %-97 %] 94 %  I/O last 3 completed shifts:  In: 1040 [P.O.:1040]  Out: 575 [Urine:575]    Constitutional: Elderly white male, awake, lethargic, O2 Sats 94% on RA  Respiratory: BS vesicular bilaterally, but reduced in both bases, with fine bi-basal insp crackles, no wheezing  Cardiovascular: S1 and S2, reg, no murmur noted  GI: Soft, non-distended, non-tender, no masses, BS present+  Skin/Integumen: No rashes  Extremities: Bilat pedal edema 2+    Medications     - MEDICATION INSTRUCTIONS -       Continuing ACE inhibitor/ARB/ARNI from home medication list OR ACE inhibitor/ARB order already placed during this visit       - MEDICATION INSTRUCTIONS -         melatonin  3 mg Oral At Bedtime     cholecalciferol  4,000 Units Oral Daily     clopidogrel  75 mg Oral Daily      fluticasone  1 spray Both Nostrils Daily     hydrALAZINE  20 mg Oral TID     insulin aspart  1-7 Units Subcutaneous TID AC     insulin glargine  5 Units Subcutaneous Daily     isosorbide dinitrate  10 mg Oral TID     ranitidine  150 mg Oral At Bedtime     furosemide  40 mg Intravenous BID     sodium chloride (PF)  10 mL Intracatheter Q8H     insulin aspart  1-7 Units Subcutaneous TID AC     insulin aspart  1-5 Units Subcutaneous At Bedtime       Data     Recent Labs  Lab 12/09/17  0630 12/08/17  1700 12/08/17  0550 12/07/17  0550 12/06/17  1429   WBC  --   --   --  9.6 8.1   HGB  --   --   --  10.6* 11.5*   MCV  --   --   --  88 89   PLT  --   --   --  229 248   INR 1.98*  --  2.96* 3.31* 3.23*   * 128*  --  129* 128*   POTASSIUM 5.0 4.9  --  4.8 4.7   CHLORIDE 95 93*  --  96 93*   CO2 25 25  --  23 24   BUN 73* 80*  --  76* 75*   CR 2.22* 2.30*  --  2.19* 2.36*   ANIONGAP 9 10  --  10 11   BERE 8.5 8.6  --  8.8 9.2   * 236*  --  98 108*   TROPI  --   --   --   --  0.836*       No results found for this or any previous visit (from the past 24 hour(s)).

## 2017-12-09 NOTE — PLAN OF CARE
Problem: Cardiac: Heart Failure (Adult)  Goal: Signs and Symptoms of Listed Potential Problems Will be Absent, Minimized or Managed (Cardiac: Heart Failure)  Signs and symptoms of listed potential problems will be absent, minimized or managed by discharge/transition of care (reference Cardiac: Heart Failure (Adult) CPG).   Outcome: Improving  Heart Failure Care Pathway  GOALS TO BE MET BEFORE DISCHARGE:    1. Decrease congestion and/or edema with diuretic therapy to achieve near      optimal volume status.            Dyspnea improved:  Yes            Edema improved:     Yes        Net I/O and Weights since admission:          12/03 1500 - 12/08 1459  In: 1270 [P.O.:1270]  Out: 1800 [Urine:1800]  Net: -530            Vitals:    12/06/17 1951 12/07/17 0550 12/08/17 0500   Weight: 79.2 kg (174 lb 11.2 oz) 77.3 kg (170 lb 6.7 oz) 76.9 kg (169 lb 8.5 oz)       2.  O2 sats > 92% on RA or at prior home O2 therapy level.          Current oxygenation status:       SpO2: 97 %         O2 Device: None (Room air),            Able to wean O2 this shift to keep sats > 92%:  NA       Does patient use Home O2? No    3.  Tolerates ambulation and mobility near baseline: No, please explain: very deconditioned--mobility limited due to this, does not have increased SOB, cardiac rehab following.         How many times did the patient ambulate with nursing staff this shift? 3, up and down to and from chair, and bedside commode, cardiac rehab following.     Please review the Heart Failure Care Pathway for additional HF goal outcomes.    VSS, no c/o pain, biggest complaint is inability to sleep--trazadone PRN ordered, has pressure ulcer located between crease on right buttocks--WOC consulted, g xt postive however nothing reversible, will plan to diurese and work with cardiac rehab for plans of TCU at discharge.  Continue to monitor closely.         Saadia Madison RN  12/8/2017

## 2017-12-09 NOTE — PLAN OF CARE
Problem: Patient Care Overview  Goal: Plan of Care/Patient Progress Review  Outcome: Therapy, progress toward functional goals is gradual  OT: Pt is an 89 year old male admitted with SOB, weakness.  Suffering from severe CHF, NSTEMI found as well.  Pt denies any cardiac symptoms prior to admission.  PMH includes CKD stage 3, permanent Afib, HTN, Type 1 DM, diabetic polyneuropathy, DVT, stroke.  Pt was vague about history but stated that he was in a TCU at the time of this admission.  Lives with spouse in a [place].  States he goes down to meals, has staff clean his place.    Discharge Planner OT   Patient plan for discharge: Home, may be accepting of rehab if needed.  Current status: Pt in bed, awake.  He is very slow to move, needing to take frequent breaks during activity.  Mod A of 2 to come to EOB.  Demonstrated intention tremor but was able to grab pills to take himself.  Mod A to stand, pt moved with a very slow, shuffling gait to the chair to sit.  Has been using a walker since this admission, got a walker for pt to use in the room.  Able to bend legs up one over another to don and doff socks.  Right leg harder than left.  Breakfast tray present, set pt up for breakfast.to prior living situation  Barriers to return: Pt appears very deconditioned, needing multiple breaks and experiencing SOB with minimal activity.  Has some mild confusion at times.  Recommendations for discharge: TCU  Rationale for recommendations: Current medical condition and low endurance level for activity.  Pt states wife is not able to help much.       Entered by: Les Munoz 12/09/2017 9:30 AM

## 2017-12-09 NOTE — PHARMACY-ANTICOAGULATION SERVICE
Clinical Pharmacy - Warfarin Dosing Consult     Pharmacy has been consulted to manage this patient s warfarin therapy.  Indication: Atrial Fibrillation  Therapy Goal: INR 2-3  Warfarin Prior to Admission: Yes  Warfarin PTA Regimen: 7.5 mg on Monday, Wednesday and Friday; 5 mg AOD  Significant drug interactions: Plavix    INR   Date Value Ref Range Status   12/09/2017 1.98 (H) 0.86 - 1.14 Final   12/08/2017 2.96 (H) 0.86 - 1.14 Final       Recommend warfarin 5 mg today.  Pharmacy will monitor Dennys NITESH Ed daily and order warfarin doses to achieve specified goal.      Please contact pharmacy as soon as possible if the warfarin needs to be held for a procedure or if the warfarin goals change.      Cathy Ba, PatriciaD

## 2017-12-09 NOTE — PROGRESS NOTES
[facility]    Cardiology Progress Note    Date of Service (when I saw the patient): 12/09/2017     Assessment & Plan   Dennys Ward is a 89 year old male who was admitted on 12/6/2017.     Active Problems:    CHF (congestive heart failure) (H)    Assessment: Patient appears relatively euvolemic and minimally symptomatic.     Plan: will switch to PO furosemide. Could consider switching from isordil to Imdur for simplicity, however the patient did not have a preference. CV will sign off for now.      Dennys Nixon MD    Interval History   Patient denies any new symptoms.    Physical Exam   Temp: 97.9  F (36.6  C) Temp src: Oral BP: 103/72   Heart Rate: 90 Resp: 16 SpO2: 96 % O2 Device: None (Room air)    Vitals:    12/07/17 0550 12/08/17 0500 12/09/17 0600   Weight: 77.3 kg (170 lb 6.7 oz) 76.9 kg (169 lb 8.5 oz) 77.7 kg (171 lb 3.2 oz)     Vital Signs with Ranges  Temp:  [95.5  F (35.3  C)-97.9  F (36.6  C)] 97.9  F (36.6  C)  Heart Rate:  [83-94] 90  Resp:  [16-17] 16  BP: ()/(57-72) 103/72  SpO2:  [94 %-97 %] 96 %  I/O last 3 completed shifts:  In: 860 [P.O.:860]  Out: 875 [Urine:875]    Constitutional: awake, alert, cooperative, no apparent distress, and appears stated age  Respiratory: No increased work of breathing, good air exchange, clear to auscultation bilaterally, no crackles or wheezing  Cardiovascular: Normal apical impulse, regular rate and rhythm, normal S1 and S2, no S3 or S4, and no murmur noted  GI: No scars, normal bowel sounds, soft, non-distended, non-tender, no masses palpated, no hepatosplenomegally  Musculoskeletal: no lower extremity pitting edema present    Medications     Warfarin Therapy Reminder       - MEDICATION INSTRUCTIONS -       Continuing ACE inhibitor/ARB/ARNI from home medication list OR ACE inhibitor/ARB order already placed during this visit       - MEDICATION INSTRUCTIONS -         warfarin  5 mg Oral ONCE at 18:00     furosemide  60 mg Oral BID      melatonin  3 mg Oral At Bedtime     cholecalciferol  4,000 Units Oral Daily     clopidogrel  75 mg Oral Daily     fluticasone  1 spray Both Nostrils Daily     hydrALAZINE  20 mg Oral TID     insulin aspart  1-7 Units Subcutaneous TID AC     insulin glargine  5 Units Subcutaneous Daily     isosorbide dinitrate  10 mg Oral TID     ranitidine  150 mg Oral At Bedtime     sodium chloride (PF)  10 mL Intracatheter Q8H     insulin aspart  1-7 Units Subcutaneous TID AC     insulin aspart  1-5 Units Subcutaneous At Bedtime       Data   Results for orders placed or performed during the hospital encounter of 12/06/17 (from the past 24 hour(s))   Glucose by meter   Result Value Ref Range    Glucose 244 (H) 70 - 99 mg/dL   Glucose by meter   Result Value Ref Range    Glucose 203 (H) 70 - 99 mg/dL   Glucose by meter   Result Value Ref Range    Glucose 165 (H) 70 - 99 mg/dL   Glucose by meter   Result Value Ref Range    Glucose 139 (H) 70 - 99 mg/dL   INR   Result Value Ref Range    INR 1.98 (H) 0.86 - 1.14   Basic metabolic panel   Result Value Ref Range    Sodium 129 (L) 133 - 144 mmol/L    Potassium 5.0 3.4 - 5.3 mmol/L    Chloride 95 94 - 109 mmol/L    Carbon Dioxide 25 20 - 32 mmol/L    Anion Gap 9 3 - 14 mmol/L    Glucose 143 (H) 70 - 99 mg/dL    Urea Nitrogen 73 (H) 7 - 30 mg/dL    Creatinine 2.22 (H) 0.66 - 1.25 mg/dL    GFR Estimate 28 (L) >60 mL/min/1.7m2    GFR Estimate If Black 34 (L) >60 mL/min/1.7m2    Calcium 8.5 8.5 - 10.1 mg/dL   Glucose by meter   Result Value Ref Range    Glucose 167 (H) 70 - 99 mg/dL   Glucose by meter   Result Value Ref Range    Glucose 247 (H) 70 - 99 mg/dL

## 2017-12-09 NOTE — PLAN OF CARE
Problem: Patient Care Overview  Goal: Plan of Care/Patient Progress Review  OT/CR: Attempted to see for eval per MD order.  Pt sleeping soundly.  Will try back latter today.

## 2017-12-10 NOTE — PLAN OF CARE
Problem: Patient Care Overview  Goal: Plan of Care/Patient Progress Review  Outcome: Improving  Heart Failure Care Pathway  GOALS TO BE MET BEFORE DISCHARGE:    1. Decrease congestion and/or edema with diuretic therapy to achieve near      optimal volume status.            Dyspnea improved:  {Yes,           Edema improved:     {Yes,     Net I/O and Weights since admission:          12/04 2300 - 12/09 2259  In: 2230 [P.O.:2230]  Out: 2975 [Urine:2975]  Net: -745            Vitals:    12/06/17 1951 12/07/17 0550 12/08/17 0500 12/09/17 0600   Weight: 79.2 kg (174 lb 11.2 oz) 77.3 kg (170 lb 6.7 oz) 76.9 kg (169 lb 8.5 oz) 77.7 kg (171 lb 3.2 oz)       2.  O2 sats > 92% on RA or at prior home O2 therapy level.          Current oxygenation status:       SpO2: 95 %         O2 Device: None (Room air),            Able to wean O2 this shift to keep sats > 92%:  , NA,        Does patient use Home O2? {no    3.  Tolerates ambulation and mobility near baseline:, No,         How many times did the patient ambulate with nursing staff this shift? 0    vss overnight, remains a&o x 4. ass x 1. Skin on sacrium broken and foam dressing in place for  Protection. lower extremities edema is improving, no complaints of pain or discomfort overnight. Slept long periods with the help of sleeping pills. Tele remains afib cvr, will continue to monitor. Bloods sent this am via picc line. Blood from red lumen but not blue. Blue port flushed but no blood return. Jones patent and draining. inr today 2.16    Please review the Heart Failure Care Pathway for additional HF goal outcomes.    Eloisa Shipley RN  12/10/2017

## 2017-12-10 NOTE — SIGNIFICANT EVENT
Pt. This AM was A&O. Tele Afib CVR. Ate breakfast. BP recheck 90/60. Held imdur and hydralazine. Pt. Denied dizziness. Therapy came into room to work with patient. Writer helped PT to assist pt. To edge of the bed and stand with walker. Changed mepilex dressing. At that time writer left the room. A couple min. Later vocera stated low heart rate, monitor checked and showing asystole, writer went to room and saw pt. Slumped over in chair next to bedside and PT next to patient. Staff assist called. Writer able to feel a weak carotid pulse and pt. Took a deep gasp with brief slight eye open sternal rub but then went unresponsive again.  Charge nurse pressed code blue button and informed that pt. DNR/DNI. Staff helped transition pt. From chair to bed. Code team arrived. Writer is aware that atropine was given and bagged during event. Pt. Lost pulse. PPM called family. Patient and room prepped for family.

## 2017-12-10 NOTE — PLAN OF CARE
Problem: Patient Care Overview  Goal: Plan of Care/Patient Progress Review  OT/CR: Attempted ambulation in room.  Pt slow to move, mod A of 2 to EOB.  Stood with Mod A of 1.  Ambulated about 6 feet with walker, asked to sit down.  Able to back up almost to chair and then did a controlled sit into chair.  Pt ended up doubled over, breathing but not responsive.  Nursing called and code called.  Please see nursing/MD notes for outcome of code.

## 2017-12-10 NOTE — PROGRESS NOTES
Pt. Family arrive around 12:30pm. Son Cj present. Declined 1:1 time/johan/autopsoy. Pt. Personal belongings bag sent with family, watch, glasses, and ring left with patient. PICC line removed. Body prepped for security to  body. Tagged on toe and outside of bag.

## 2017-12-10 NOTE — PROGRESS NOTES
MD DEATH PRONOUNCEMENT    Called to pronounce Dennys Ward dead.    Physical Exam: Unresponsive to noxious stimuli, Spontaneous respirations absent, Breath sounds absent, Carotid pulse absent and Heart sounds absent    Patient was pronounced dead at 9: 55 AM, December 10, 2017.    Active Problems:    CHF (congestive heart failure) (H)       Infectious disease present?: NO    Communicable disease present? (examples: HIV, chicken pox, TB, Ebola, CJD) :  NO    Multi-drug resistant organism present? (example: MRSA): NO    Please consider an autopsy if any of the following exist:  NO Unexpected or unexplained death during or following any dental, medical, or surgical diagnostic treatment procedures.   NO Death of mother at or up to seven days after delivery.     NO All  and pediatric deaths.     NO Death where the cause is sufficiently obscure to delay completion of the death certificate.   NO Deaths in which autopsy would confirm a suspected illness/condition that would affect surviving family members or recipients of transplanted organs.     The following deaths must be reported to the 's Office:  NO A death that may be due entirely or in part to any factors other than natural disease (recent surgery, recent trauma, suspected abuse/neglect).   NO A death that may be an accident, suicide, or homicide.     NO Any sudden, unexpected death in which there is no prior history of significant heart disease or any other condition associated with sudden death.   NO A death under suspicious, unusual, or unexpected circumstances.    NO Any death which is apparently due to natural causes but in which the  does not have a personal physician familiar with the patient s medical history, social, or environmental situation or the circumstances of the terminal event.   NO Any death apparently due to Sudden Infant Death Syndrome.     NO Deaths that occur during, in association with, or as consequences of a  diagnostic, therapeutic, or anesthetic procedure.   NO Any death in which a fracture of a major bone has occurred within the past (6) six months.   NO A death of persons note seen by their physician within 120 days of demise.     NO Any death in which the  was an inmate of a public institution or was in the custody of Law Enforcement personnel.   NO  All unexpected deaths of children   NO Solid organ donors   NO Unidentified bodies   NO Deaths of persons whose bodies are to be cremated or otherwise disposed of so that the bodies will later be unavailable for examination;   NO Deaths unattended by a physician outside of a licensed healthcare facility or licensed residential hospice program   NO Deaths occurring within 24 hours of arrival to a health care facility if death is unexpected.    NO Deaths associated with the decedent s employment.   NO Deaths attributed to acts of terrorism.   NO Any death in which there is uncertainty as to whether it is a medical examiner s care should be discussed with the medical investigator.        Body disposition: Please refer to nursing documentation.    Please direct death certificate to Dr. Bowser.    Family was notified by House Supervisor.     RUDY Sandhu, CNP  Hospitalist Service, House Officer  [facility]     Text Page  Pager: [phone]

## 2017-12-10 NOTE — CODE/RAPID RESPONSE
[facility]    CODE BLUE Note  12/10/2017   Time Called: 9: 45 AM    Mr. Ward was working with therapy when he became unresponsive. He was assisted to bed from chair. Telemetry noted profound bradycardia and Atropine was given. Upon my arrival Mr. Ward did not have a pulse and was not spontaneously breathing. Respirations were being assisted with bag-valve mask. DNR/DNI status was confirmed and resuscitation efforts were ceased.     INTERVENTIONS:  - Atropine x 1 IV  - DNR/DNI confirmed with wrist band and EMR  - family notified by House Supervisor   - Dr. Bowser was notified    Interval History     Dennys Ward is a 89 year old male who was admitted on 12/6/2017 for evaluation and management of acute-on-chronic systolic heart failure. Per nursing, patient and his family had been discussing Hospice as patient was recently failing to thrive.     Medical history significant for: recent NSTEMI, chronic kidney disease, hypertension, chronic atrial fibrillation    Code Status: DNR/DNI    RUDY Sandhu, CNP  Hospitalist Service, House Officer  [facility]     Text Page  Pager: [phone]    Allergies   Allergies   Allergen Reactions     Lisinopril Diarrhea     No Clinical Screening - See Comments Swelling     Aspirin Rash       CBC with Diff:  Recent Labs   Lab Test  12/10/17   0600   12/07/17   0550   WBC   --    --   9.6   HGB   --    --   10.6*   MCV   --    --   88   PLT   --    --   229   INR  2.16*   < >  3.31*    < > = values in this interval not displayed.      No results found for: RETICABSCT  No results found for: RETP    Lactic Acid:    Lab Results   Component Value Date    LACT 4.9 11/30/2017           Comprehensive Metabolic Panel:    Recent Labs  Lab 12/10/17  0600 12/09/17  0630   * 129*   POTASSIUM 4.5 5.0   CHLORIDE 96 95   CO2 24 25   ANIONGAP 9 9   * 143*   BUN 71* 73*   CR 2.11* 2.22*   GFRESTIMATED 30* 28*   GFRESTBLACK 36* 34*   BERE  8.4* 8.5   MAG  --  2.7*       INR:    Recent Labs   Lab Test  12/10/17   0600   INR  2.16*       Time Spent on this Encounter   I spent 15 minutes on the unit/floor managing the care of Dennys Ward. Over 50% of my time was spent counseling the patient and/or coordinating care regarding services listed in this note.

## 2017-12-10 NOTE — DISCHARGE SUMMARY
[facility]    Death Summary  Hospitalist    Date of Admission:  12/6/2017  Date of Death:         12/10/2017  Provider Completing Death Summary: Haris Bowser    Discharge Diagnoses   1. Asystole cardiac arrest.    2.  End-stage systolic congestive heart failure, with acute on chronic decompensation.    3. Recent Non ST segment myocardial infarction.    History of Present Illness   Dennys Ward is an 89 year old male who presented with worsening shortness of breath and weakness.    Hospital Course   89-year-old gentleman with PmHx of severe systolic congestive heart failure with ejection fraction less than 20% with advanced heart failure with stage III/IV New York Heart, CKD stage 3, permanent AFIB, hypertension, type 1 diabetes with diabetic polyneuropathy, DVT, stroke, was admitted on 12/6/17 due to complaints of worsening shortness of breath and weakness. Work up done on 12/6/17 revealed, BMP significant for Na+ 125, K+ 5.4, BUN 73, creat 2.5.  Initial troponin was 0.836. Chest x-rays on 12/6/17 revealed, bilat pleural effusion.    The patient was admitted to the CICU and was reviewed by the Cardiologist. He was started on IV lasix 40mg bid. He had a lexiscan test done on 12/7/17 which revealed, a moderate to large defect involving the mid and distal anterior wall, mid and distal septum as well as the entire apex. The appearances are consistent with the presence of a moderate to large transmural infarction affecting this area, without evidence of significant residual ischemia. Gated images demonstrated akinesis of this defect.  The LV systolic function is severely reduced, with a calculated ejection fraction of 23%.    During his admission, the patient was very lethargic and had shortness of breath on minimal exertion. He was reviewed by the Cardiac Rehab team. On 12/10/17, the patient was ambulating with the therapist in his room, when he suddenly slumped in the bedside chair.  He was found to have a weak carotid pulse, bradycardia and he became unresponsive.   The code team was called and the patient was given atropine x 1 and his respiration was assisted with bag-valve mask. It was later confirmed that the patient's code status was DNR/DNI and all resuscitation efforts was discontinued. The patient was pronounced dead at 9.55am on 12/10/17.          Haris Bowser     Pending Results   Unresulted Labs Ordered in the Past 30 Days of this Admission     No orders found from 10/7/2017 to 12/7/2017.          Primary Care Physician   Jona Ortiz    Consultations This Hospital Stay   VASCULAR ACCESS ADULT IP CONSULT  CORE CLINIC EVALUATION IP CONSULT  CARDIAC REHAB IP CONSULT  CARE COORDINATOR IP CONSULT  NUTRITION SERVICES ADULT IP CONSULT  CARDIOLOGY IP CONSULT  PHARMACY TO DOSE WARFARIN  WOUND OSTOMY CONTINENCE NURSE  IP CONSULT  PHARMACY TO DOSE WARFARIN    Time Spent on this Encounter   I, Haris Bowser, personally saw the patient today and spent less than or equal to 30 minutes discharging this patient.    Data   Most Recent 3 CBC's:  Recent Labs   Lab Test  12/07/17   0550  12/06/17   1429  11/26/17   0512   WBC  9.6  8.1  10.5   HGB  10.6*  11.5*  11.6*   MCV  88  89  91   PLT  229  248  245      Most Recent 3 BMP's:  Recent Labs   Lab Test  12/10/17   0600  12/09/17   0630  12/08/17   1700   NA  129*  129*  128*   POTASSIUM  4.5  5.0  4.9   CHLORIDE  96  95  93*   CO2  24  25  25   BUN  71*  73*  80*   CR  2.11*  2.22*  2.30*   ANIONGAP  9  9  10   BERE  8.4*  8.5  8.6   GLC  130*  143*  236*     Most Recent 2 LFT's:  Recent Labs   Lab Test  11/24/17   1054   AST  41   ALT  33   ALKPHOS  115   BILITOTAL  0.9     Most Recent INR's and Anticoagulation Dosing History:  Anticoagulation Dose History     Recent Dosing and Labs Latest Ref Rng & Units 12/3/2017 12/4/2017 12/6/2017 12/7/2017 12/8/2017 12/9/2017 12/10/2017    Warfarin 1 mg - 1 mg - - - - - -     Warfarin 5 mg - - - - - - 5 mg -    INR 0.86 - 1.14 3.10(H) 3.71(H) 3.23(H) 3.31(H) 2.96(H) 1.98(H) 2.16(H)        Most Recent 3 Troponin's:  Recent Labs   Lab Test  12/06/17   1429  11/26/17   0512  11/25/17   2346   TROPI  0.836*  4.419*  4.134*     Most Recent Cholesterol Panel:No lab results found.  Most Recent 6 Bacteria Isolates From Any Culture (See EPIC Reports for Culture Details):  Recent Labs   Lab Test  11/24/17   2103  11/24/17   1138  11/24/17   1054   CULT  No growth  No growth  No growth     Most Recent TSH, T4 and A1c Labs:  Recent Labs   Lab Test  11/24/17   1550   A1C  8.4*       Results for orders placed or performed during the hospital encounter of 12/06/17   XR Chest Port 1 View    Narrative    CHEST ONE VIEW PORTABLE    12/6/2017 6:46 PM     INDICATION: RN placed PICC - verify tip placement.    COMPARISON: 11/24/2017.      Impression    IMPRESSION: Left PICC tip in SVC. Bilateral pleural effusions are  again seen. Shallow inspiration.    RADHA JONES MD   NM Lexiscan stress test (nuc card)    Narrative    GATED MYOCARDIAL PERFUSION SCINTIGRAPHY WITH INTRAVENOUS PHARMACOLOGIC  VASODILATATION LEXISCAN -ONE DAY STUDY     12/7/2017 2:52 PM  LEON RAMIREZ  89 years  Male  2/9/1928.    Indication/Clinical History: Myocardial infarction and cardiomyopathy    Impression  1.  Myocardial perfusion imaging using single isotope technique  demonstrated a moderate to large defect involving the mid and distal  anterior wall, mid and distal septum as well as the entire apex. The  appearances are consistent with the presence of a moderate to large  transmural infarction affecting this area without evidence of  significant residual ischemia.   2. Gated images demonstrated akinesis of this defect.  The left  ventricular systolic function is severely reduced with a calculated  ejection fraction of 23%.  There are no previous studies for comparison .    Procedure  Pharmacologic stress testing was  performed with Lexiscan at a rate of  0.08 mg/ml rapid bolus injection, for 15 seconds, 0.4 mg/5ml  intravenously. Low-level exercise was not performed along with the  vasodilator infusion.  The heart rate was 83 at baseline and adán to  89 beats per minute during the Lexiscan infusion. The rest blood  pressure was 100/73 mmHg and was 90/59 mm Hg during Lexiscan infusion.  The patient experienced shortness of breath  during the test.    Myocardial perfusion imaging was performed at rest, approximately 45  minutes after the injection intravenously of 10.2 mCi of Tc-99m  Myoview. At peak pharmacologic effect, 10-20 seconds after Lexiscan,   the patient was injected intravenously with 33.9 mCi of  Tc-99m  Myoview. The post-stress tomographic imaging was performed  approximately 60 minutes after stress.    EKG Findings  The resting EKG demonstrated atrial fibrillation with evidence of a  extensive anterior and perhaps inferior infarction. There is left  anterior hemiblock with very poor R wave progression. The stress EKG  demonstrated no significant ST segment changes.    Tomographic Findings  Overall, the study quality is adequate . On the stress images, there  is a moderate to large defect involving the mid and distal anterior  wall, mid and distal septum as well as the entire apex. There is a  very severe reduction in radiotracer uptake. On the rest images, no  appreciable changes were were seen to this defect . Gated images  demonstrated akinesis of the mid and distal anterior wall, the apex as  well as the septum. The left ventricular ejection fraction was  calculated to be 23%. TID was absent.    BRODY FARLEY MD

## (undated) RX ORDER — REGADENOSON 0.08 MG/ML
INJECTION, SOLUTION INTRAVENOUS
Status: DISPENSED
Start: 2017-01-01